# Patient Record
Sex: FEMALE | Race: WHITE | NOT HISPANIC OR LATINO | Employment: UNEMPLOYED | URBAN - METROPOLITAN AREA
[De-identification: names, ages, dates, MRNs, and addresses within clinical notes are randomized per-mention and may not be internally consistent; named-entity substitution may affect disease eponyms.]

---

## 2017-10-16 ENCOUNTER — OFFICE VISIT (OUTPATIENT)
Dept: NEUROSURGERY | Facility: CLINIC | Age: 32
End: 2017-10-16

## 2017-10-16 VITALS
BODY MASS INDEX: 24.24 KG/M2 | SYSTOLIC BLOOD PRESSURE: 90 MMHG | DIASTOLIC BLOOD PRESSURE: 55 MMHG | WEIGHT: 142 LBS | HEIGHT: 64 IN | TEMPERATURE: 97.6 F

## 2017-10-16 DIAGNOSIS — M54.50 CHRONIC MIDLINE LOW BACK PAIN WITHOUT SCIATICA: Primary | ICD-10-CM

## 2017-10-16 DIAGNOSIS — G89.29 CHRONIC MIDLINE LOW BACK PAIN WITHOUT SCIATICA: Primary | ICD-10-CM

## 2017-10-16 PROCEDURE — 99243 OFF/OP CNSLTJ NEW/EST LOW 30: CPT | Performed by: NEUROLOGICAL SURGERY

## 2017-10-16 RX ORDER — DIPHENHYDRAMINE HCL 25 MG
25 CAPSULE ORAL EVERY 6 HOURS PRN
COMMUNITY

## 2017-10-16 RX ORDER — CETIRIZINE HYDROCHLORIDE 10 MG/1
10 TABLET ORAL NIGHTLY
COMMUNITY
End: 2020-06-17 | Stop reason: CLARIF

## 2017-10-16 RX ORDER — GABAPENTIN 300 MG/1
300 CAPSULE ORAL DAILY
COMMUNITY
End: 2018-05-17 | Stop reason: ALTCHOICE

## 2017-10-16 NOTE — PROGRESS NOTES
Subjective     Chief Complaint: Neck pain, back pain    Patient ID: Elisa Hugo is a 31 y.o. female seen for consultation today at the request of  RENITA Salcedo    Back Pain   This is a chronic problem. The current episode started more than 1 year ago. The problem occurs constantly. The problem is unchanged. The pain is present in the lumbar spine. The quality of the pain is described as aching. The pain does not radiate. The pain is at a severity of 10/10. The pain is severe. The pain is the same all the time. The symptoms are aggravated by bending, position, sitting, twisting and standing. Stiffness is present all day. Associated symptoms include headaches, leg pain, numbness, paresthesias, tingling and weakness. Pertinent negatives include no abdominal pain, bladder incontinence, bowel incontinence, chest pain, dysuria, fever, paresis, pelvic pain, perianal numbness or weight loss. Risk factors include lack of exercise, poor posture and sedentary lifestyle. She has tried analgesics, NSAIDs, home exercises, heat, chiropractic manipulation and muscle relaxant for the symptoms. The treatment provided no relief.       This is a 31-year-old woman who presents to my clinic with chief complaints of neck and low back pain.  This problem is chronic, and has been progressive over the course of the last few months.  She has tried physical therapy, oral anti-inflammatories, and pain management, none of which has been effective at alleviating her pain.  She denies any history of depression, anxiety, or fibromyalgia.  She does have a remote history of substance abuse.    The following portions of the patient's history were reviewed and updated as appropriate: allergies, current medications, past family history, past medical history, past social history, past surgical history and problem list.    Family history:   Family History   Problem Relation Age of Onset   • Arthritis Mother        Social history:   Social  History     Social History   • Marital status: Single     Spouse name: N/A   • Number of children: N/A   • Years of education: N/A     Occupational History   • Not on file.     Social History Main Topics   • Smoking status: Current Every Day Smoker     Packs/day: 0.50   • Smokeless tobacco: Never Used   • Alcohol use Yes   • Drug use: No   • Sexual activity: Defer     Other Topics Concern   • Not on file     Social History Narrative   • No narrative on file       Review of Systems   Constitutional: Negative for activity change, appetite change, chills, diaphoresis, fatigue, fever, unexpected weight change and weight loss.   HENT: Negative for congestion, dental problem, drooling, ear discharge, ear pain, facial swelling, hearing loss, mouth sores, nosebleeds, postnasal drip, rhinorrhea, sinus pressure, sneezing, sore throat, tinnitus, trouble swallowing and voice change.    Eyes: Negative for photophobia, pain, discharge, redness, itching and visual disturbance.   Respiratory: Negative for apnea, cough, choking, chest tightness, shortness of breath, wheezing and stridor.    Cardiovascular: Negative for chest pain, palpitations and leg swelling.   Gastrointestinal: Negative for abdominal distention, abdominal pain, anal bleeding, blood in stool, bowel incontinence, constipation, diarrhea, nausea, rectal pain and vomiting.   Endocrine: Negative for cold intolerance, heat intolerance, polydipsia, polyphagia and polyuria.   Genitourinary: Negative for bladder incontinence, decreased urine volume, difficulty urinating, dysuria, enuresis, flank pain, frequency, genital sores, hematuria, pelvic pain and urgency.   Musculoskeletal: Positive for back pain and neck pain. Negative for arthralgias, gait problem, joint swelling, myalgias and neck stiffness.   Skin: Negative for color change, pallor, rash and wound.   Allergic/Immunologic: Negative for environmental allergies, food allergies and immunocompromised state.  "  Neurological: Positive for tingling, weakness, numbness, headaches and paresthesias. Negative for dizziness, tremors, seizures, syncope, facial asymmetry, speech difficulty and light-headedness.   Hematological: Negative for adenopathy. Does not bruise/bleed easily.   Psychiatric/Behavioral: Negative for agitation, behavioral problems, confusion, decreased concentration, dysphoric mood, hallucinations, self-injury, sleep disturbance and suicidal ideas. The patient is not nervous/anxious and is not hyperactive.    All other systems reviewed and are negative.      Objective   Blood pressure 90/55, temperature 97.6 °F (36.4 °C), height 64\" (162.6 cm), weight 142 lb (64.4 kg).  Body mass index is 24.37 kg/(m^2).    Physical Exam   Constitutional: She is oriented to person, place, and time. She appears well-developed.  Non-toxic appearance.   HENT:   Head: Normocephalic and atraumatic.   Right Ear: Hearing normal.   Left Ear: Hearing normal.   Nose: Nose normal.   Eyes: Conjunctivae, EOM and lids are normal. Pupils are equal, round, and reactive to light.   Neck: Normal range of motion. No JVD present.   Cardiovascular: Normal rate and regular rhythm.    Pulses:       Radial pulses are 2+ on the right side, and 2+ on the left side.   Pulmonary/Chest: Effort normal. No stridor. No respiratory distress. She has no wheezes.   Neurological: She is alert and oriented to person, place, and time. She has normal reflexes. She displays normal reflexes. No cranial nerve deficit. She exhibits normal muscle tone. GCS eye subscore is 4. GCS verbal subscore is 5. GCS motor subscore is 6.   Reflex Scores:       Tricep reflexes are 2+ on the right side and 2+ on the left side.       Bicep reflexes are 2+ on the right side and 2+ on the left side.       Brachioradialis reflexes are 2+ on the right side and 2+ on the left side.       Patellar reflexes are 2+ on the right side and 2+ on the left side.       Achilles reflexes are 2+ on " the right side and 2+ on the left side.  Skin: Skin is warm and dry. No rash noted. No erythema.   Psychiatric: She has a normal mood and affect. Her behavior is normal. Judgment and thought content normal.   Nursing note and vitals reviewed.        Assessment/Plan     Independent Review of Radiographic Studies:      Available for my review is a MRI of the cervical spine performed on 10-17.  This discloses a large disc osteophyte complex which is present at C6 7.  This is paracentral he directed and does not appear to cause any significant lateral recess or neural foraminal stenosis.  There is effacement of the ventral aspect of the CSF space, however the central canal is capacious and there is CSF signal present circumferentially around the spinal cord this level.  A MRI of the lumbar spine was also performed on the same day.  This discloses a herniated disc at L5-S1 which is eccentric to the right side.  This causes mild lateral recess stenosis, and impingement of the descending S1 nerve root.  There is no significant neural foraminal stenosis.  There is no appreciable degenerative disc disease the lumbar spine.  The central canal is capacious.  There is mild facet arthropathy.    Medical Decision Making:      This is a 31-year-old woman with chronic neck and low back pain.  She has mild degenerative disc disease, and no imaging findings that would warrant surgical intervention at this point.    I did review the signs and symptoms of cervical radiculopathy, cervical myelopathy, lumbosacral radiculopathy, and cauda equina with her.  I directed her to contact my office with new, or worsening neurological symptoms.    My impression is that she has a chronic pain syndrome at this point, and may benefit from some serotonin modulation, specifically Cymbalta.  She has a referral to [facility], which appears to be a [facility] pain management clinic here in [place].  I think this looks like an  excellent choice for her, and hopefully we will be able to get her on a pain management regimen that will offer her some degree of relief.    I did inform her that ultimately physical therapy and exercise will be the only thing that is likely to give her any assertive durable relief in terms of her pain.  I did inform her that her neck and back pain are likely to temporarily worsened with physical therapy, however there is no role for surgical intervention, and she is cleared for a full physical therapy regimen from my standpoint.    She can follow-up with me in 6 months, or sooner if clinically indicated.    Elisa was seen today for back pain, leg pain, extremity weakness, tingling and numbness.    Diagnoses and all orders for this visit:    Chronic midline low back pain without sciatica    Other orders  -     SCANNED - IMAGING  -     SCANNED - IMAGING  -     SCANNED - IMAGING  -     SCANNED - LABS      Return in about 6 months (around 4/16/2018).           This document signed by MARI De Dios MD October 16, 2017 3:48 PM

## 2017-11-30 ENCOUNTER — OFFICE VISIT (OUTPATIENT)
Dept: NEUROSURGERY | Facility: CLINIC | Age: 32
End: 2017-11-30

## 2017-11-30 VITALS — HEIGHT: 64 IN

## 2017-11-30 DIAGNOSIS — M50.20 HERNIATION OF CERVICAL INTERVERTEBRAL DISC: Primary | ICD-10-CM

## 2017-11-30 DIAGNOSIS — M51.34 DDD (DEGENERATIVE DISC DISEASE), THORACIC: ICD-10-CM

## 2017-11-30 DIAGNOSIS — M51.26 HERNIATION OF INTERVERTEBRAL DISC OF LUMBAR SPINE: ICD-10-CM

## 2017-11-30 PROCEDURE — 99213 OFFICE O/P EST LOW 20 MIN: CPT | Performed by: NEUROLOGICAL SURGERY

## 2017-11-30 RX ORDER — TIZANIDINE 4 MG/1
4 TABLET ORAL
Status: ON HOLD | COMMUNITY
Start: 2017-10-27 | End: 2018-09-06

## 2017-11-30 RX ORDER — ALBUTEROL SULFATE 90 UG/1
2 AEROSOL, METERED RESPIRATORY (INHALATION) EVERY 4 HOURS PRN
COMMUNITY
Start: 2017-09-18

## 2017-11-30 RX ORDER — BACLOFEN 10 MG/1
TABLET ORAL
Status: ON HOLD | COMMUNITY
Start: 2017-11-06 | End: 2018-02-01

## 2017-11-30 NOTE — PROGRESS NOTES
Subjective   Elisa Hugo is a 31 y.o. female who presents for follow up of  neck and left shoulder pain.     The patient has had low back pain for about a year, and neck pain radiating to her left shoulder and arm for about the past 3-6 months.  MRI of lumbar spine shows degenerative disc at L5-S1 with a small right paracentral herniation.  She has no right sided radiculopathy to correlate with this.  Cervical MRI scan shows a degenerative disc and central bulge with eccentric bulge/osteophyte/disc herniation on the left side extending into the foramen.  Thoracic MR shows a mid thoracic degenerative disc bulge of no clinical/surgical consequence.    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  She has a 9-year-old child.  She lives in [place].    The following portions of the patient's history were reviewed, updated as appropriate and approved: allergies, current medications, past family history, past medical history, past social history, past surgical history, review of systems and problem list.     Review of Systems   Constitutional: Negative for activity change, appetite change, chills, diaphoresis, fatigue, fever and unexpected weight change.   HENT: Negative for congestion, dental problem, drooling, ear discharge, ear pain, facial swelling, hearing loss, mouth sores, nosebleeds, postnasal drip, rhinorrhea, sinus pressure, sneezing, sore throat, tinnitus, trouble swallowing and voice change.    Eyes: Negative for photophobia, pain, discharge, redness, itching and visual disturbance.   Respiratory: Negative for apnea, cough, choking, chest tightness, shortness of breath, wheezing and stridor.    Cardiovascular: Negative for chest pain, palpitations and leg swelling.   Gastrointestinal: Negative for abdominal distention, abdominal pain, anal bleeding, blood in stool, constipation, diarrhea, nausea, rectal pain and vomiting.   Endocrine: Negative for cold intolerance, heat  intolerance, polydipsia, polyphagia and polyuria.   Genitourinary: Negative for decreased urine volume, difficulty urinating, dysuria, enuresis, flank pain, frequency, genital sores, hematuria, pelvic pain and urgency.   Musculoskeletal: Positive for back pain, neck pain and neck stiffness. Negative for arthralgias, gait problem, joint swelling and myalgias.   Skin: Negative for color change, pallor, rash and wound.   Allergic/Immunologic: Negative for environmental allergies, food allergies and immunocompromised state.   Neurological: Positive for dizziness, weakness, light-headedness, numbness and headaches. Negative for tremors, seizures, syncope, facial asymmetry and speech difficulty.   Hematological: Negative for adenopathy. Does not bruise/bleed easily.   Psychiatric/Behavioral: Negative for agitation, behavioral problems, confusion, decreased concentration, dysphoric mood, hallucinations, self-injury, sleep disturbance and suicidal ideas. The patient is not nervous/anxious and is not hyperactive.    All other systems reviewed and are negative.      Objective    NEUROLOGICAL EXAMINATION:    Motor strength intact and deltoid biceps and triceps on the left.  No focal sensory loss, although she has numbness of both hands in the morning and tingling of all the fingers in the left hand during the day.  1+ biceps and triceps reflexes bilaterally.    Assessment   Cervical degenerative disc C6 7 with central and left sided herniation, possible left C7 radiculopathy.  Right paracentral disc herniation L5-S1 with no clinical evidence of S1 radiculopathy.       Plan   Cervical physical therapy and follow-up in 4 weeks.       César Peraza MD

## 2018-01-11 ENCOUNTER — OFFICE VISIT (OUTPATIENT)
Dept: NEUROSURGERY | Facility: CLINIC | Age: 33
End: 2018-01-11

## 2018-01-11 VITALS — WEIGHT: 149 LBS | BODY MASS INDEX: 25.44 KG/M2 | HEIGHT: 64 IN

## 2018-01-11 DIAGNOSIS — M50.20 HERNIATION OF CERVICAL INTERVERTEBRAL DISC: Primary | ICD-10-CM

## 2018-01-11 PROCEDURE — 99213 OFFICE O/P EST LOW 20 MIN: CPT | Performed by: NEUROLOGICAL SURGERY

## 2018-01-11 NOTE — PATIENT INSTRUCTIONS
If you have any questions in regards to your visit with  today, please do not hesitate to contact our office. Ask to speak with a [facility] for any clinical questions you may have.    Antoinette Fred, [facility]    [phone]     Myelography  Myelography is an X-ray test that uses a special dye to look at your spine or neck. This test is usually done to look for:  · Spinal cord injury.  · Disk ruptures.  · Fluid-filled pockets of tissue (cysts) on your spinal cord or nerve roots.  · Tumors on your spinal cord or nerve roots.  BEFORE THE PROCEDURE  Arrange for someone to drive you home after the test.   PROCEDURE  · You will lie on your stomach during the procedure.  · Medicine may be given to you to help you relax.  · A numbing medicine will be applied to area that they will be injecting you with a needle.  · A needle will be inserted between two of your backbones.  · A special machine will be used to help your doctor guide the needle into the sac that surrounds your spinal cord and nerves. A special dye will be injected into this sac.  · The table you lie on may be moved around to make sure the dye moves all around your spinal cord and nerves.  · Pictures of the area will be taken by X-ray or CT.  AFTER THE PROCEDURE  · You will be taken to a recovery area.  · You will lie flat with your head in a raised position. This is to prevent a severe headache.     This information is not intended to replace advice given to you by your health care provider. Make sure you discuss any questions you have with your health care provider.     Document Released: 09/26/2009 Document Revised: 01/08/2016 Document Reviewed: 09/29/2016  [employer] Interactive Patient Education ©2017 [employer]    Myelography, Care After  These instructions give you information on caring for yourself after your procedure. Your doctor may also give you more specific instructions. Call your doctor if you have any problems or questions after your  procedure.  HOME CARE  · Rest the first day.  · When you rest, lie flat, with your head slightly raised (elevated).  · Avoid heavy lifting and activity for 48 hours, or as told by your doctor.  · You may take the bandage (dressing) off one day after the test, or as told by your doctor.  · Take all medicines only as told by your doctor.  · Ask your doctor when it is okay to take a shower or bath.  · Ask your doctor when your test results will be ready and how you can get them. Make sure you follow up and get your results.  · Do not drink alcohol for 24 hours, or as told by your doctor.  · Drink enough fluid to keep your pee (urine) clear or pale yellow.  GET HELP IF:   · You have a fever.  · You have a headache.  · You feel sick to your stomach (nauseous) or throw up (vomit).  · You have pain or cramping in your belly (abdomen).  GET HELP RIGHT AWAY IF:   · You have a headache with a stiff neck or fever.  · You have trouble breathing.  · Any of the places where the needles were put in are:    Puffy (swollen) or red.    Sore or hot to the touch.    Draining yellowish-white fluid (pus).    Bleeding.  MAKE SURE YOU:  · Understand these instructions.  · Will watch your condition.  · Will get help right away if you are not doing well or get worse.     This information is not intended to replace advice given to you by your health care provider. Make sure you discuss any questions you have with your health care provider.     Document Released: 09/26/2009 Document Revised: 01/08/2016 Document Reviewed: 09/29/2016  [employer] Interactive Patient Education ©2017 [employer]      If you have any questions in regards to your visit with  today, please do not hesitate to contact our office. Ask to speak with a [facility] for any clinical questions you may have.    Antoinette Ibarra, [facility]    [phone]

## 2018-01-11 NOTE — PROGRESS NOTES
Subjective   Elisa Hugo is a 32 y.o. female who presents for follow up of  neck and left arm pain.     Elisa had physical therapy for about 8 sessions over the last month for a left neck shoulder and arm pain due to a probable disc herniation at C6-7 on the left.  Symptoms have not improved.    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  She lives in [place].    She also has thoracic and lumbar pain and complains of nocturnal urinary incontinence.    The following portions of the patient's history were reviewed, updated as appropriate and approved: allergies, current medications, past family history, past medical history, past social history, past surgical history, review of systems and problem list.     Review of Systems   Constitutional: Negative for activity change, appetite change, chills, diaphoresis, fatigue, fever and unexpected weight change.   HENT: Negative for congestion, dental problem, drooling, ear discharge, ear pain, facial swelling, hearing loss, mouth sores, nosebleeds, postnasal drip, rhinorrhea, sinus pressure, sneezing, sore throat, tinnitus, trouble swallowing and voice change.    Eyes: Negative for photophobia, pain, discharge, redness, itching and visual disturbance.   Respiratory: Negative for apnea, cough, choking, chest tightness, shortness of breath, wheezing and stridor.    Cardiovascular: Negative for chest pain, palpitations and leg swelling.   Gastrointestinal: Negative for abdominal distention, abdominal pain, anal bleeding, blood in stool, constipation, diarrhea, nausea, rectal pain and vomiting.   Endocrine: Negative for cold intolerance, heat intolerance, polydipsia, polyphagia and polyuria.   Genitourinary: Positive for dysuria and flank pain. Negative for decreased urine volume, difficulty urinating, enuresis, frequency, genital sores, hematuria and  urgency.   Musculoskeletal: Positive for back pain, neck pain and neck stiffness. Negative for arthralgias, gait problem, joint swelling and myalgias.   Skin: Negative for color change, pallor, rash and wound.   Allergic/Immunologic: Negative for environmental allergies, food allergies and immunocompromised state.   Neurological: Positive for dizziness, weakness, light-headedness, numbness and headaches. Negative for tremors, seizures, syncope, facial asymmetry and speech difficulty.   Hematological: Negative for adenopathy. Does not bruise/bleed easily.   Psychiatric/Behavioral: Negative for agitation, behavioral problems, confusion, decreased concentration, dysphoric mood, hallucinations, self-injury, sleep disturbance and suicidal ideas. The patient is not nervous/anxious and is not hyperactive.        Objective    NEUROLOGICAL EXAMINATION:    No motor weakness in the left upper extremity no sensory loss in the left upper extremity.  Tingling in the fingers of the left hand during the day.  Trace biceps and triceps reflexes bilaterally.    Assessment   Probable cervical disc herniation C6-7 left.       Plan   Cervical myelogram and follow-up.       César Peraza MD

## 2018-02-01 ENCOUNTER — APPOINTMENT (OUTPATIENT)
Dept: CT IMAGING | Facility: HOSPITAL | Age: 33
End: 2018-02-01

## 2018-02-01 ENCOUNTER — HOSPITAL ENCOUNTER (OUTPATIENT)
Facility: HOSPITAL | Age: 33
Discharge: HOME | End: 2018-02-01
Attending: RADIOLOGY | Admitting: RADIOLOGY

## 2018-02-01 VITALS
OXYGEN SATURATION: 100 % | BODY MASS INDEX: 25.33 KG/M2 | DIASTOLIC BLOOD PRESSURE: 71 MMHG | RESPIRATION RATE: 16 BRPM | SYSTOLIC BLOOD PRESSURE: 113 MMHG | HEIGHT: 64 IN | WEIGHT: 148.37 LBS | HEART RATE: 96 BPM | TEMPERATURE: 98.1 F

## 2018-02-01 DIAGNOSIS — M50.20 HERNIATION OF CERVICAL INTERVERTEBRAL DISC: ICD-10-CM

## 2018-02-01 LAB — B-HCG UR QL: NEGATIVE

## 2018-02-01 PROCEDURE — 0 IOPAMIDOL 61 % SOLUTION: Performed by: RADIOLOGY

## 2018-02-01 PROCEDURE — 72240 MYELOGRAPHY NECK SPINE: CPT | Performed by: RADIOLOGY

## 2018-02-01 PROCEDURE — 61055 INJECTION INTO BRAIN CANAL: CPT | Performed by: RADIOLOGY

## 2018-02-01 PROCEDURE — 72126 CT NECK SPINE W/DYE: CPT

## 2018-02-01 PROCEDURE — 81025 URINE PREGNANCY TEST: CPT | Performed by: RADIOLOGY

## 2018-02-01 NOTE — H&P (VIEW-ONLY)
Subjective   Elisa Hugo is a 32 y.o. female who presents for follow up of  neck and left arm pain.     Elisa had physical therapy for about 8 sessions over the last month for a left neck shoulder and arm pain due to a probable disc herniation at C6-7 on the left.  Symptoms have not improved.    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  She lives in [place].    She also has thoracic and lumbar pain and complains of nocturnal urinary incontinence.    The following portions of the patient's history were reviewed, updated as appropriate and approved: allergies, current medications, past family history, past medical history, past social history, past surgical history, review of systems and problem list.     Review of Systems   Constitutional: Negative for activity change, appetite change, chills, diaphoresis, fatigue, fever and unexpected weight change.   HENT: Negative for congestion, dental problem, drooling, ear discharge, ear pain, facial swelling, hearing loss, mouth sores, nosebleeds, postnasal drip, rhinorrhea, sinus pressure, sneezing, sore throat, tinnitus, trouble swallowing and voice change.    Eyes: Negative for photophobia, pain, discharge, redness, itching and visual disturbance.   Respiratory: Negative for apnea, cough, choking, chest tightness, shortness of breath, wheezing and stridor.    Cardiovascular: Negative for chest pain, palpitations and leg swelling.   Gastrointestinal: Negative for abdominal distention, abdominal pain, anal bleeding, blood in stool, constipation, diarrhea, nausea, rectal pain and vomiting.   Endocrine: Negative for cold intolerance, heat intolerance, polydipsia, polyphagia and polyuria.   Genitourinary: Positive for dysuria and flank pain. Negative for decreased urine volume, difficulty urinating, enuresis, frequency, genital sores, hematuria and  urgency.   Musculoskeletal: Positive for back pain, neck pain and neck stiffness. Negative for arthralgias, gait problem, joint swelling and myalgias.   Skin: Negative for color change, pallor, rash and wound.   Allergic/Immunologic: Negative for environmental allergies, food allergies and immunocompromised state.   Neurological: Positive for dizziness, weakness, light-headedness, numbness and headaches. Negative for tremors, seizures, syncope, facial asymmetry and speech difficulty.   Hematological: Negative for adenopathy. Does not bruise/bleed easily.   Psychiatric/Behavioral: Negative for agitation, behavioral problems, confusion, decreased concentration, dysphoric mood, hallucinations, self-injury, sleep disturbance and suicidal ideas. The patient is not nervous/anxious and is not hyperactive.        Objective    NEUROLOGICAL EXAMINATION:    No motor weakness in the left upper extremity no sensory loss in the left upper extremity.  Tingling in the fingers of the left hand during the day.  Trace biceps and triceps reflexes bilaterally.    Assessment   Probable cervical disc herniation C6-7 left.       Plan   Cervical myelogram and follow-up.       César Peraza MD

## 2018-02-01 NOTE — PLAN OF CARE
Problem: Myelogram (Adult)  Goal: Signs and Symptoms of Listed Potential Problems Will be Absent or Manageable (Myelogram)  Outcome: Ongoing (interventions implemented as appropriate)   02/01/18 0804   Myelogram   Problems Assessed (Myelogram) all   Problems Present (Myelogram) none

## 2018-02-08 ENCOUNTER — OFFICE VISIT (OUTPATIENT)
Dept: NEUROSURGERY | Facility: CLINIC | Age: 33
End: 2018-02-08

## 2018-02-08 VITALS — TEMPERATURE: 98 F | BODY MASS INDEX: 25.33 KG/M2 | WEIGHT: 148.37 LBS | HEIGHT: 64 IN

## 2018-02-08 DIAGNOSIS — M50.20 HNP (HERNIATED NUCLEUS PULPOSUS), CERVICAL: Primary | ICD-10-CM

## 2018-02-08 DIAGNOSIS — M50.20 HERNIATION OF CERVICAL INTERVERTEBRAL DISC: Primary | ICD-10-CM

## 2018-02-08 PROCEDURE — 99213 OFFICE O/P EST LOW 20 MIN: CPT | Performed by: NEUROLOGICAL SURGERY

## 2018-02-08 RX ORDER — SODIUM CHLORIDE, SODIUM LACTATE, POTASSIUM CHLORIDE, CALCIUM CHLORIDE 600; 310; 30; 20 MG/100ML; MG/100ML; MG/100ML; MG/100ML
100 INJECTION, SOLUTION INTRAVENOUS CONTINUOUS
Status: CANCELLED | OUTPATIENT
Start: 2018-02-08

## 2018-02-08 RX ORDER — RANITIDINE 150 MG/1
150 TABLET ORAL NIGHTLY PRN
COMMUNITY
Start: 2018-01-23 | End: 2019-02-11 | Stop reason: HOSPADM

## 2018-02-08 NOTE — PATIENT INSTRUCTIONS
Anterior Cervical Diskectomy and Fusion  Introduction  Anterior cervical diskectomy and fusion is a surgery to remove and replace an intervertebral disk. Intervertebral disks are plates of cartilage located between the bones of the spine. This surgery is done when an intervertebral disk in the neck puts pressure on the spine or on a nerve.  The surgery is done through the front (anterior) part of the neck. During the surgery, the damaged disk is removed and replaced with a plastic implant, a bone from another part of the body (bone graft), or both. Sometimes metal plates and screws (hardware) are also put in the neck to help keep the implant or bone graft in place and to allow the bones to grow together (fuse).  Tell a health care provider about:  · Any allergies you have.  · All medicines you are taking, including vitamins, herbs, eye drops, creams, and over-the-counter medicines.  · Any problems you or family members have had with anesthetic medicines.  · Any blood disorders you have.  · Any surgeries you have had.  · Any medical conditions you have.  · Whether you are pregnant or may be pregnant.  What are the risks?  Generally, this is a safe procedure. However, problems may occur, including:  · Infection.  · Bleeding with the possible need for blood transfusion.  · Injury to surrounding structures, including nerves.  · Leakage of fluid from the brain or spinal cord (cerebrospinal fluid).  · Blood clots.  · Temporary breathing difficulties.  What happens before the procedure?  · Follow instructions from your health care provider about eating or drinking restrictions.  · Ask your health care provider about:  ¨ Changing or stopping your regular medicines. This is especially important if you are taking diabetes medicines or blood thinners.  ¨ Taking medicines such as aspirin and ibuprofen. These medicines can thin your blood. Do not take these medicines before your procedure if your health care provider instructs  you not to.  · You may be given antibiotic medicines to help prevent infection.  What happens during the procedure?  · An IV tube will be inserted into one of your veins.  · You will be given one or more of the following:  ¨ A medicine that helps you relax (sedative).  ¨ A medicine that makes you fall asleep (general anesthetic).  · A breathing tube will be placed.  · Your neck will be cleaned with a germ-killing solution (antiseptic solution).  · Your surgeon will make a cut (incision) in the front of your neck.  · Your neck muscles will be spread apart.  · The damaged disk and any damaged bone will be removed.  · The area where the disk was removed will be filled with a small plastic implant, a bone graft, or both.  · Hardware may be put in your neck.  · The incision will be closed with stitches (sutures).  · Adhesive strips or skin glue may be placed across the incision.  · A bandage (dressing) will be applied over the incision.  The procedure may vary among health care providers and hospitals.  What happens after the procedure?  · Your blood pressure, heart rate, breathing rate, and blood oxygen level will be monitored often until the medicines you were given have worn off.  · You will be monitored for any signs of complications from the procedure, such as:  ¨ Too much bleeding from the incision site.  ¨ A buildup of blood under your skin at the surgical site.  ¨ Difficulty breathing.  · You may continue to receive antibiotics.  · You can start to eat as soon as you feel comfortable.  · You may be given a neck brace to wear. This brace limits your neck movement while your bones are fusing.  This information is not intended to replace advice given to you by your health care provider. Make sure you discuss any questions you have with your health care provider.  Document Released: 12/06/2010 Document Revised: 05/25/2017 Document Reviewed: 12/01/2016  © 2017 [employer]    Anterior Cervical Diskectomy and Fusion, Care  After  Introduction  Refer to this sheet in the next few weeks. These instructions provide you with information about caring for yourself after your procedure. Your health care provider may also give you more specific instructions. Your treatment has been planned according to current medical practices, but problems sometimes occur. Call your health care provider if you have any problems or questions after your procedure.  What can I expect after the procedure?  After the procedure, it is common to have:  · Neck pain.  · Discomfort when swallowing.  · Slight hoarseness.  Follow these instructions at home:  If You Have a Neck Brace:  · Wear it as told by your health care provider. Remove it only as told by your health care provider.  · Keep the brace clean and dry.  Incision care  · Follow instructions from your health care provider about how to take care of the cut made during surgery (incision). Make sure you:  ¨ Wash your hands with soap and water before you change your bandage (dressing). If soap and water are not available, use hand .  ¨ Change your dressing as told by your health care provider.  ¨ Leave stitches (sutures), skin glue, or adhesive strips in place. These skin closures may need to stay in place for 2 weeks or longer. If adhesive strip edges start to loosen and curl up, you may trim the loose edges. Do not remove adhesive strips completely unless your health care provider tells you to do that.  · Check your incision every day for signs of infection. Watch for:  ¨ Redness, swelling, or pain.  ¨ Fluid or blood.  ¨ Warmth.  ¨ Pus or a bad smell.  Managing pain, stiffness, and swelling  · Take over-the-counter and prescription medicines only as told by your health care provider.  · If directed, apply ice to the injured area.  ¨ Put ice in a plastic bag.  ¨ Place a towel between your skin and the bag.  ¨ Leave the ice on for 20 minutes, 2-3 times per day.  Activity  · Return to your normal  activities as told by your health care provider. Ask your health care provider what activities are safe for you.  · Do exercises as told by your health care provider.  · Do not lift anything that is heavier than 10 lb (4.5 kg).  General instructions  · Do not drive or operate heavy machinery while taking prescription pain medicines.  · Do not use any tobacco products, including cigarettes, chewing tobacco, or e-cigarettes. Tobacco can delay healing. If you need help quitting, ask your health care provider.  · Keep all follow-up visits and physical therapy appointments as told by your health care provider. This is important.  Contact a health care provider if:  · You have a fever.  · You have redness, swelling, or pain around your incision.  · You have fluid or blood coming from your incision.  · You have pus or a bad smell coming from your incision.  · You have pain that is not controlled by your pain medicine.  · You have increasing hoarseness or trouble swallowing.  Get help right away if:  · You have severe pain.  · You have sudden numbness or weakness in your arms.  · You have warmth, tenderness, or swelling in your calf.  · You have chest pain.  · You have difficulty breathing.  This information is not intended to replace advice given to you by your health care provider. Make sure you discuss any questions you have with your health care provider.  Document Released: 01/13/2017 Document Revised: 05/25/2017 Document Reviewed: 12/01/2016  © 2017 [employer]    Cervical Fusion  Cervical fusion is a procedure that is done on bones in the neck (cervical spine) to relieve pressure on the spinal cord or one or more nerve roots.  There are two types of cervical fusion:  · Posterior cervical fusion. This surgery is done through the back (posterior) of the neck. The goal of this procedure is to make two or more of the bones in the spinal column (vertebrae) grow together (fuse). This procedure is most commonly used to treat  neck fractures and dislocations and to fix deformities in the curve of the neck.  · Anterior cervical fusion. This surgery is done through the front (anterior) part of the neck. This procedure is most commonly used to treat herniated cervical disk, degenerative cervical disease, and arthritis in the cervical spine. During this type of surgery:  ¨ The affected disk between the two vertebrae (intervertebral disk) is removed, as well as any extra bone on the edges of the vertebrae (bone spurs). This takes pressure off of the nerves or spinal cord (decompression).  ¨ The area where the disk was removed is filled with a bone material (graft) or artificial bone material (implant) and then it fuses over time.  In either procedure, hardware such as rods, screws, metal plates, or cages may be inserted to stabilize the vertebrae while they heal.  Tell a health care provider about:  · Any allergies you have.  · All medicines you are taking, including vitamins, herbs, eye drops, creams, and over-the-counter medicines.  · Any problems you or family members have had with anesthetic medicines.  · Any blood disorders you have.  · Any surgeries you have had.  · Any medical conditions you have.  · Whether you are pregnant or may be pregnant.  What are the risks?  Generally, this is a safe procedure. However, problems may occur, including:  · Infection.  · Bleeding.  · Allergic reactions to medicines or dyes.  · Damage to other structures or organs, such as nerves near the spine.  · Spinal fluid leakage.  · Blood clots.  · Trouble controlling urination or bowel movements.  · Vertebrae not fusing together completely (pseudoarthrosis).  · Temporary hoarseness of the voice.  · Difficulty swallowing.  What happens before the procedure?  Staying hydrated   Follow instructions from your health care provider about hydration, which may include:  · Up to 2 hours before the procedure - you may continue to drink clear liquids, such as water,  clear fruit juice, black coffee, and plain tea.  Eating and drinking restrictions   Follow instructions from your health care provider about eating and drinking, which may include:  · 8 hours before the procedure - stop eating heavy meals or foods such as meat, fried foods, or fatty foods.  · 6 hours before the procedure - stop eating light meals or foods, such as toast or cereal.  · 6 hours before the procedure - stop drinking milk or drinks that contain milk.  · 2 hours before the procedure - stop drinking clear liquids.  Medicines  · Ask your health care provider about:  ¨ Changing or stopping your regular medicines. This is especially important if you are taking diabetes medicines or blood thinners.  ¨ Taking medicines such as aspirin and ibuprofen. These medicines can thin your blood. Do not take these medicines before your procedure if your health care provider instructs you not to.  · You may be given antibiotic medicine to help prevent infection.  General instructions  · Ask your health care provider how your surgical site will be marked or identified.  · You will have blood and urine samples taken.  · You may have tests, such as:  ¨ X-rays.  ¨ CT scan.  ¨ MRI.  · Plan to have someone take you home from the hospital or clinic.  · If you will be going home right after the procedure, plan to have someone with you for 24 hours.  What happens during the procedure?  · To reduce your risk of infection:  ¨ Your health care team will wash or sanitize their hands.  ¨ Your skin will be washed with soap.  ¨ Hair may be removed from the surgical area.  · An IV tube will be inserted into one of your veins.  · You will be given one or more of the following:  ¨ A medicine to help you relax (sedative).  ¨ A medicine to make you fall asleep (general anesthetic).  · If you are having a posterior cervical fusion:  ¨ An incision will be made in the back of your neck.  ¨ Two or more vertebrae will be joined into one solid  section of bone with a bone graft.  · If you are having an anterior cervical fusion:  ¨ An incision will be made in the area where the fusion will be placed. This is usually done at the front of your neck.  ¨ Your neck muscles will be pushed aside.  ¨ The affected disk and bone spurs will be removed (decompression).  ¨ The area where the disk was removed will then be filled with bone graft or bone implants or both.  · Screws and rods or metal plates may be used to stabilize the vertebrae while they fuse.  · Your incision may be closed.  · A bandage (dressing) may be placed over your incision.  The procedure may vary among health care providers and hospitals.  What happens after the procedure?  · You will be given medicine to relieve pain as needed.  · You will continue to receive fluids and medicines through an IV tube.  · Your blood pressure, heart rate, breathing rate, and blood oxygen level will be monitored until the medicines you were given have worn off.  · You may be given a brace to wear while you heal.  · Do not drive until your health care provider approves.  · You may have to wear compression stockings. These stockings help to prevent blood clots and reduce swelling in your legs.  · You may be asked to do breathing exercises. This helps prevent a lung infection.  · You will be encouraged to stand up and walk as soon as you are able. You will be taught how to move correctly and how to stand and walk.  · You will be assisted to turn in bed frequently by moving your whole body without twisting your back (log rolling technique).  This information is not intended to replace advice given to you by your health care provider. Make sure you discuss any questions you have with your health care provider.  Document Released: 06/09/2003 Document Revised: 07/12/2017 Document Reviewed: 06/28/2017  [employer] Interactive Patient Education © 2017 [employer]    Cervical Fusion, Care After  This sheet gives you information  about how to care for yourself after your procedure. Your health care provider may also give you more specific instructions. If you have problems or questions, contact your health care provider.  What can I expect after the procedure?  After the procedure, it is common to have:  · Incision area pain.  · Numbness.  · Weakness.  · Sore throat.  · Difficulty swallowing.  Follow these instructions at home:  Medicines  · Take over-the-counter and prescription medicines, including pain medicines, only as told by your health care provider.  · If you were prescribed an antibiotic medicine, take it as told by your health care provider. Do not stop taking the antibiotic even if you start to feel better.  If you have a brace:  · Wear the brace as told by your health care provider. Remove it only as told by your health care provider.  · Keep the brace clean.  Incision care  · Follow instructions from your health care provider about how to take care of your incision. Make sure you:  ¨ Wash your hands with soap and water before you change your bandage (dressing). If soap and water are not available, use hand .  ¨ Change your dressing as told by your health care provider.  ¨ Leave stitches (sutures), skin glue, or adhesive strips in place. These skin closures may need to be in place for 2 weeks or longer. If adhesive strip edges start to loosen and curl up, you may trim the loose edges. Do not remove adhesive strips completely unless your health care provider tells you to do that.  · Keep your incision clean and dry. Do not take baths, swim, or use a hot tub until your health care provider approves.  · Check your incision area every day for signs of infection. Check for:  ¨ More redness, swelling, or pain.  ¨ More fluid or blood.  ¨ Warmth.  ¨ Pus or a bad smell.  Activity  · Rest and protect your back as much as possible.  · Do not lift anything that is heavier than 10 lb (4.5 kg) or the limit that you are told by your  health care provider.  · Do not twist or bend at the waist until your health care provider approves.  · Avoid:  ¨ Pushing and pulling motions.  ¨ Lifting anything over your head.  ¨ Sitting or lying down in the same position for long periods of time.  · Do not exercise until your health care provider approves. Once your health care provider has approved exercise, ask him or her what kinds of exercises you can do to make your back stronger (physical therapy).  · Do not drive until your health care provider approves.  ¨ Do not drive for 24 hours if you received a medicine to help you relax (sedative).  ¨ Do not drive or use heavy machinery while taking prescription pain medicine.  General instructions  · Have someone assist you to turn in bed frequently by moving your whole body without twisting your back (log rolling technique).  · Wear compression stockings as told by your health care provider. These stockings help to prevent blood clots and reduce swelling in your legs.  · Do not use any products that contain nicotine or tobacco, such as cigarettes and e-cigarettes. These can delay bone healing. If you need help quitting, ask your health care provider.  · To prevent or treat constipation while you are taking prescription pain medicine, your health care provider may recommend that you:  ¨ Drink enough fluid to keep your urine clear or pale yellow.  ¨ Take over-the-counter or prescription medicines.  ¨ Eat foods that are high in fiber, such as fresh fruits and vegetables, whole grains, and beans.  ¨ Limit foods that are high in fat and processed sugars, such as fried and sweet foods.  · Keep all follow-up visits as told by your health care provider. This is important.  Contact a health care provider if:  · You have pain that gets worse or does not get better with medicine.  · You have more redness, swelling, or pain around your incision.  · You have more fluid or blood coming from your incision.  · Your incision  feels warm to the touch.  · You have pus or a bad smell coming from your incision.  · You have a fever.  · You have weakness or numbness in your legs that is new or getting worse.  · You have swelling in your calf or leg.  · The edges of your incision break open.  · You vomit or feel nauseous.  · You have trouble controlling urination or bowel movements.  Get help right away if:  · You develop shortness of breath or chest pain.  · You have trouble swallowing.  · You have trouble breathing.  · You develop a cough.  This information is not intended to replace advice given to you by your health care provider. Make sure you discuss any questions you have with your health care provider.  Document Released: 08/01/2005 Document Revised: 07/12/2017 Document Reviewed: 06/28/2017  [employer] Interactive Patient Education © 2017 [employer]      If you have any questions in regards to your visit with  today, please do not hesitate to contact our office. Ask to speak with a [facility] for any clinical questions you may have.    Antoinette Ibarra [facility]    [phone]

## 2018-02-08 NOTE — PROGRESS NOTES
Subjective   Elisa Hugo is a 32 y.o. female who presents for follow up of  neck and left arm pain.     The patient has had neck and left arm pain for 6 months or more.  MRI scan showed a probable disc herniation at C6-7 on the left.  Physical therapy has been thoroughly utilized but has not resolved the pain.  She has both neck pain and left shoulder and arm pain.  She works in a  center.    Cervical myelogram with post myelogram CT scan shows on the CT scan incomplete nerve root filling into the foramen on the left side, consistent with a foraminal disc herniation at C6-7 on the left.  There was no nerve root deformity on the routine films, but the CT scan appears to confirm the diagnosis.    The following portions of the patient's history were reviewed, updated as appropriate and approved: allergies, current medications, past family history, past medical history, past social history, past surgical history, review of systems and problem list.     Review of Systems   Constitutional: Negative for activity change, appetite change, chills, diaphoresis, fatigue, fever and unexpected weight change.   HENT: Negative for congestion, dental problem, drooling, ear discharge, ear pain, facial swelling, hearing loss, mouth sores, nosebleeds, postnasal drip, rhinorrhea, sinus pressure, sneezing, sore throat, tinnitus, trouble swallowing and voice change.    Eyes: Negative for photophobia, pain, discharge, redness, itching and visual disturbance.   Respiratory: Negative for apnea, cough, choking, chest tightness, shortness of breath, wheezing and stridor.    Cardiovascular: Negative for chest pain, palpitations and leg swelling.   Gastrointestinal: Negative for abdominal distention, abdominal pain, anal bleeding, blood in stool, constipation, diarrhea, nausea, rectal pain and vomiting.   Endocrine: Negative for cold intolerance, heat intolerance, polydipsia, polyphagia and polyuria.   Genitourinary: Positive for  dysuria and flank pain. Negative for decreased urine volume, difficulty urinating, enuresis, frequency, genital sores, hematuria and urgency.   Musculoskeletal: Positive for back pain, neck pain and neck stiffness. Negative for arthralgias, gait problem, joint swelling and myalgias.   Skin: Negative for color change, pallor, rash and wound.   Allergic/Immunologic: Negative for environmental allergies, food allergies and immunocompromised state.   Neurological: Positive for dizziness, weakness, light-headedness, numbness and headaches. Negative for tremors, seizures, syncope, facial asymmetry and speech difficulty.   Hematological: Negative for adenopathy. Does not bruise/bleed easily.   Psychiatric/Behavioral: Negative for agitation, behavioral problems, confusion, decreased concentration, dysphoric mood, hallucinations, self-injury, sleep disturbance and suicidal ideas. The patient is not nervous/anxious and is not hyperactive.    All other systems reviewed and are negative.      Objective    NEUROLOGICAL EXAMINATION:    No motor weakness in the upper extremities.  Tingling intermittently in the left hand during the day.  Trace biceps and triceps reflexes equally bilaterally.    Assessment   Foraminal cervical disc herniation C6-7 left.       Plan   Had a long discussion with her regarding the diagnosis and the findings on cervical myelogram which are somewhat equivocal, but combined with the clinical findings correlate with left C7 radiculopathy.  For this reason anterior cervical discectomy and fusion at C6-7 is a reasonable choice.  She will have to think about it and decide if she wants to elect this surgery at this time.  If she decides to do so she will simply have to call to let us know and we will work on scheduling.       César Peraza MD

## 2018-02-21 PROBLEM — M50.20 HNP (HERNIATED NUCLEUS PULPOSUS), CERVICAL: Status: ACTIVE | Noted: 2018-02-21

## 2018-03-23 ENCOUNTER — TELEPHONE (OUTPATIENT)
Dept: NEUROSURGERY | Facility: CLINIC | Age: 33
End: 2018-03-23

## 2018-03-23 DIAGNOSIS — M50.20 HNP (HERNIATED NUCLEUS PULPOSUS), CERVICAL: Primary | ICD-10-CM

## 2018-03-23 NOTE — TELEPHONE ENCOUNTER
Provider: Stu  Caller: Elisa  Time of call:   1024am  Phone #:  [phone]  Surgery:  UPCOMING- ACDF C6-7  Surgery Date: 04/18/18  Last visit:  02/08/18   Next visit: After surgery      Reason for call:       Pt called this morning saying she is having NEW symptoms.   Pt states 2 days ago she started having pain/numbness in the RIGHT side of her neck, radiating down into the shoulder and right arm.     Pt wants to know if she needs any imaging and f/u prior to surgery?

## 2018-03-23 NOTE — TELEPHONE ENCOUNTER
This is actually a patient of Dr. Peraza:    She has been c/p pain and weakness lifting heavy objects with the left arm as well as tingling in the fingers of her left hand.     I reviewed her MRI; it looks like the C6/7 is the only place that is markedly abnormal, and it would not surprise me for her to start having pain on the right as well.    I tried to call and there was no answer. I left a message for her to call back

## 2018-03-26 NOTE — TELEPHONE ENCOUNTER
Called the pt back and let her know she will need a new MRI and f/u visit prior to surgery. Pt verbalized understanding. Routed to ET for scheduling.

## 2018-03-26 NOTE — TELEPHONE ENCOUNTER
I got in touch with the patient this morning. She is experiencing pain at the base of her skull in her neck that radiates down the right side of her neck, into her shoulder. She has limited range of motion in her neck and the pain will radiate the entire outer length of her right arm and into her index, and middle finger if she turns her head to the right too far.     I advised her of Andie's note. She would like to know for sure if she needs to be seen again prior to surgery.

## 2018-03-26 NOTE — TELEPHONE ENCOUNTER
Thank you; I needed to know where her new pain was, exactly. I discussed it with Dr. Peraza, and we would like a new MRI and follow up with him prior to her scheduled surgery. I've put the orders in, please let her know.

## 2018-03-30 ENCOUNTER — APPOINTMENT (OUTPATIENT)
Dept: MRI IMAGING | Facility: HOSPITAL | Age: 33
End: 2018-03-30

## 2018-04-05 ENCOUNTER — OFFICE VISIT (OUTPATIENT)
Dept: NEUROSURGERY | Facility: CLINIC | Age: 33
End: 2018-04-05

## 2018-04-05 ENCOUNTER — HOSPITAL ENCOUNTER (OUTPATIENT)
Dept: MRI IMAGING | Facility: HOSPITAL | Age: 33
Discharge: HOME | End: 2018-04-05
Admitting: PHYSICIAN ASSISTANT

## 2018-04-05 VITALS — HEIGHT: 64 IN

## 2018-04-05 DIAGNOSIS — M50.20 HERNIATION OF CERVICAL INTERVERTEBRAL DISC: Primary | ICD-10-CM

## 2018-04-05 DIAGNOSIS — M50.20 HNP (HERNIATED NUCLEUS PULPOSUS), CERVICAL: ICD-10-CM

## 2018-04-05 PROCEDURE — 72141 MRI NECK SPINE W/O DYE: CPT

## 2018-04-05 PROCEDURE — 99213 OFFICE O/P EST LOW 20 MIN: CPT | Performed by: NEUROLOGICAL SURGERY

## 2018-04-05 NOTE — PROGRESS NOTES
Subjective   Elisa Hugo is a 32 y.o. female who presents for follow up of  right neck shoulder and arm pain.     The patient has been followed for a left sided cervical pain syndrome presumed to be due to C6 7 central disc herniation.  Physical therapy was thoroughly used for treating the pain but did not alleviate her neck and left shoulder pain.  About 2 weeks ago she had a pop in her neck and developed severe pain in the right side her neck radiating to her right shoulder and arm.  MRI scan has been done and now shows that the central disc herniation is extruded further off center to the right side consistent with her symptoms.    The following portions of the patient's history were reviewed, updated as appropriate and approved: allergies, current medications, past family history, past medical history, past social history, past surgical history, review of systems and problem list.     Review of Systems   Constitutional: Negative for activity change, appetite change, chills, diaphoresis, fatigue, fever and unexpected weight change.   HENT: Negative for congestion, dental problem, drooling, ear discharge, ear pain, facial swelling, hearing loss, mouth sores, nosebleeds, postnasal drip, rhinorrhea, sinus pressure, sneezing, sore throat, tinnitus, trouble swallowing and voice change.    Eyes: Negative for photophobia, pain, discharge, redness, itching and visual disturbance.   Respiratory: Negative for apnea, cough, choking, chest tightness, shortness of breath, wheezing and stridor.    Cardiovascular: Negative for chest pain, palpitations and leg swelling.   Gastrointestinal: Negative for abdominal distention, abdominal pain, anal bleeding, blood in stool, constipation, diarrhea, nausea, rectal pain and vomiting.   Endocrine: Negative for cold intolerance, heat intolerance, polydipsia, polyphagia and polyuria.   Genitourinary: Negative for decreased urine volume, difficulty urinating, dysuria, enuresis, flank  pain, frequency, genital sores, hematuria and urgency.   Musculoskeletal: Positive for neck pain and neck stiffness. Negative for arthralgias, back pain, gait problem, joint swelling and myalgias.   Skin: Negative for color change, pallor, rash and wound.   Allergic/Immunologic: Negative for environmental allergies, food allergies and immunocompromised state.   Neurological: Positive for dizziness, numbness and headaches. Negative for tremors, seizures, syncope, facial asymmetry, speech difficulty, weakness and light-headedness.   Hematological: Negative for adenopathy. Does not bruise/bleed easily.   Psychiatric/Behavioral: Negative for agitation, behavioral problems, confusion, decreased concentration, dysphoric mood, hallucinations, self-injury, sleep disturbance and suicidal ideas. The patient is not nervous/anxious and is not hyperactive.        Objective    NEUROLOGICAL EXAMINATION:    Motor strength is intact and deltoid biceps and triceps.  Subjective numbness to the right arm and intermittently to the hand non-focally.  2+ biceps, trace triceps reflexes bilaterally    Assessment   Progression of central disc herniation C6-7 to right paracentral disc herniation with right C7 radiculopathy.       Plan   Anterior cervical discectomy and fusion C6-7 and is scheduled for 4/18/18.       César Peraza MD

## 2018-04-11 ENCOUNTER — APPOINTMENT (OUTPATIENT)
Dept: PREADMISSION TESTING | Facility: HOSPITAL | Age: 33
End: 2018-04-11

## 2018-04-11 VITALS — WEIGHT: 149.25 LBS | HEIGHT: 64 IN | BODY MASS INDEX: 25.48 KG/M2

## 2018-04-11 DIAGNOSIS — M50.20 HNP (HERNIATED NUCLEUS PULPOSUS), CERVICAL: ICD-10-CM

## 2018-04-11 LAB
ANION GAP SERPL CALCULATED.3IONS-SCNC: 6 MMOL/L (ref 3–11)
BASOPHILS # BLD AUTO: 0.02 10*3/MM3 (ref 0–0.2)
BASOPHILS NFR BLD AUTO: 0.3 % (ref 0–1)
BUN BLD-MCNC: <5 MG/DL (ref 9–23)
BUN/CREAT SERPL: ABNORMAL (ref 7–25)
CALCIUM SPEC-SCNC: 9.4 MG/DL (ref 8.7–10.4)
CHLORIDE SERPL-SCNC: 101 MMOL/L (ref 99–109)
CO2 SERPL-SCNC: 28 MMOL/L (ref 20–31)
CREAT BLD-MCNC: 0.6 MG/DL (ref 0.6–1.3)
DEPRECATED RDW RBC AUTO: 42.4 FL (ref 37–54)
EOSINOPHIL # BLD AUTO: 0.18 10*3/MM3 (ref 0–0.3)
EOSINOPHIL NFR BLD AUTO: 2.4 % (ref 0–3)
ERYTHROCYTE [DISTWIDTH] IN BLOOD BY AUTOMATED COUNT: 12.3 % (ref 11.3–14.5)
GFR SERPL CREATININE-BSD FRML MDRD: 116 ML/MIN/1.73
GLUCOSE BLD-MCNC: 87 MG/DL (ref 70–100)
HBA1C MFR BLD: 5.6 % (ref 4.8–5.6)
HCT VFR BLD AUTO: 44.2 % (ref 34.5–44)
HGB BLD-MCNC: 14.9 G/DL (ref 11.5–15.5)
IMM GRANULOCYTES # BLD: 0.01 10*3/MM3 (ref 0–0.03)
IMM GRANULOCYTES NFR BLD: 0.1 % (ref 0–0.6)
LYMPHOCYTES # BLD AUTO: 2.21 10*3/MM3 (ref 0.6–4.8)
LYMPHOCYTES NFR BLD AUTO: 29.3 % (ref 24–44)
MCH RBC QN AUTO: 31.6 PG (ref 27–31)
MCHC RBC AUTO-ENTMCNC: 33.7 G/DL (ref 32–36)
MCV RBC AUTO: 93.6 FL (ref 80–99)
MONOCYTES # BLD AUTO: 0.31 10*3/MM3 (ref 0–1)
MONOCYTES NFR BLD AUTO: 4.1 % (ref 0–12)
NEUTROPHILS # BLD AUTO: 4.81 10*3/MM3 (ref 1.5–8.3)
NEUTROPHILS NFR BLD AUTO: 63.8 % (ref 41–71)
PLATELET # BLD AUTO: 253 10*3/MM3 (ref 150–450)
PMV BLD AUTO: 11.7 FL (ref 6–12)
POTASSIUM BLD-SCNC: 3.2 MMOL/L (ref 3.5–5.5)
RBC # BLD AUTO: 4.72 10*6/MM3 (ref 3.89–5.14)
SODIUM BLD-SCNC: 135 MMOL/L (ref 132–146)
WBC NRBC COR # BLD: 7.54 10*3/MM3 (ref 3.5–10.8)

## 2018-04-11 PROCEDURE — 87081 CULTURE SCREEN ONLY: CPT | Performed by: ANESTHESIOLOGY

## 2018-04-11 PROCEDURE — 85025 COMPLETE CBC W/AUTO DIFF WBC: CPT | Performed by: NEUROLOGICAL SURGERY

## 2018-04-11 PROCEDURE — 83036 HEMOGLOBIN GLYCOSYLATED A1C: CPT | Performed by: NEUROLOGICAL SURGERY

## 2018-04-11 PROCEDURE — 93005 ELECTROCARDIOGRAM TRACING: CPT | Performed by: NEUROLOGICAL SURGERY

## 2018-04-11 PROCEDURE — 80048 BASIC METABOLIC PNL TOTAL CA: CPT | Performed by: NEUROLOGICAL SURGERY

## 2018-04-11 PROCEDURE — 93010 ELECTROCARDIOGRAM REPORT: CPT | Performed by: INTERNAL MEDICINE

## 2018-04-11 NOTE — DISCHARGE INSTRUCTIONS

## 2018-04-11 NOTE — PAT
Patient to apply Chlorhexadine wipes  to surgical area (as instructed) the night before procedure and the AM of procedure. Wipes provided.    Bactroban and Chlorhexidine Prescription given during PAT visit, as well as written and verbal instructions given to patient during PAT visit.

## 2018-04-13 LAB — MRSA SPEC QL CULT: NORMAL

## 2018-04-18 ENCOUNTER — HOSPITAL ENCOUNTER (OUTPATIENT)
Facility: HOSPITAL | Age: 33
Discharge: HOME | End: 2018-04-19
Attending: NEUROLOGICAL SURGERY | Admitting: NEUROLOGICAL SURGERY

## 2018-04-18 ENCOUNTER — APPOINTMENT (OUTPATIENT)
Dept: GENERAL RADIOLOGY | Facility: HOSPITAL | Age: 33
End: 2018-04-18

## 2018-04-18 ENCOUNTER — ANESTHESIA EVENT (OUTPATIENT)
Dept: PERIOP | Facility: HOSPITAL | Age: 33
End: 2018-04-18

## 2018-04-18 ENCOUNTER — ANESTHESIA (OUTPATIENT)
Dept: PERIOP | Facility: HOSPITAL | Age: 33
End: 2018-04-18

## 2018-04-18 DIAGNOSIS — M50.20 HNP (HERNIATED NUCLEUS PULPOSUS), CERVICAL: ICD-10-CM

## 2018-04-18 LAB
B-HCG UR QL: NEGATIVE
INTERNAL NEGATIVE CONTROL: NORMAL
INTERNAL POSITIVE CONTROL: REACTIVE
Lab: NORMAL
POTASSIUM BLDA-SCNC: 3.5 MMOL/L (ref 3.5–5.3)

## 2018-04-18 PROCEDURE — C1713 ANCHOR/SCREW BN/BN,TIS/BN: HCPCS | Performed by: NEUROLOGICAL SURGERY

## 2018-04-18 PROCEDURE — 25010000002 PROPOFOL 1000 MG/ML EMULSION: Performed by: NURSE ANESTHETIST, CERTIFIED REGISTERED

## 2018-04-18 PROCEDURE — 84132 ASSAY OF SERUM POTASSIUM: CPT | Performed by: NEUROLOGICAL SURGERY

## 2018-04-18 PROCEDURE — 25010000002 FENTANYL CITRATE (PF) 100 MCG/2ML SOLUTION: Performed by: NURSE ANESTHETIST, CERTIFIED REGISTERED

## 2018-04-18 PROCEDURE — 25010000003 CEFAZOLIN IN DEXTROSE 2-4 GM/100ML-% SOLUTION: Performed by: NEUROLOGICAL SURGERY

## 2018-04-18 PROCEDURE — 25010000002 PROMETHAZINE PER 50 MG: Performed by: NURSE ANESTHETIST, CERTIFIED REGISTERED

## 2018-04-18 PROCEDURE — 94640 AIRWAY INHALATION TREATMENT: CPT

## 2018-04-18 PROCEDURE — 25010000002 PHENYLEPHRINE PER 1 ML: Performed by: NURSE ANESTHETIST, CERTIFIED REGISTERED

## 2018-04-18 PROCEDURE — 22551 ARTHRD ANT NTRBDY CERVICAL: CPT | Performed by: NEUROLOGICAL SURGERY

## 2018-04-18 PROCEDURE — 72020 X-RAY EXAM OF SPINE 1 VIEW: CPT

## 2018-04-18 PROCEDURE — 22853 INSJ BIOMECHANICAL DEVICE: CPT | Performed by: NEUROLOGICAL SURGERY

## 2018-04-18 PROCEDURE — 22845 INSERT SPINE FIXATION DEVICE: CPT | Performed by: NEUROLOGICAL SURGERY

## 2018-04-18 PROCEDURE — 25010000002 NEOSTIGMINE 10 MG/10ML SOLUTION: Performed by: NURSE ANESTHETIST, CERTIFIED REGISTERED

## 2018-04-18 PROCEDURE — 25010000002 PROPOFOL 10 MG/ML EMULSION: Performed by: NURSE ANESTHETIST, CERTIFIED REGISTERED

## 2018-04-18 PROCEDURE — 25010000002 DEXAMETHASONE PER 1 MG: Performed by: NURSE ANESTHETIST, CERTIFIED REGISTERED

## 2018-04-18 PROCEDURE — 25010000002 MIDAZOLAM PER 1 MG: Performed by: NURSE ANESTHETIST, CERTIFIED REGISTERED

## 2018-04-18 PROCEDURE — 25010000002 ONDANSETRON PER 1 MG: Performed by: NURSE ANESTHETIST, CERTIFIED REGISTERED

## 2018-04-18 PROCEDURE — 20930 SP BONE ALGRFT MORSEL ADD-ON: CPT | Performed by: NEUROLOGICAL SURGERY

## 2018-04-18 DEVICE — SCREW 7723515 ZEVO VAR ST 3.5MM X 15MM
Type: IMPLANTABLE DEVICE | Site: SPINE CERVICAL | Status: FUNCTIONAL
Brand: ZEVO™ ANTERIOR CERVICAL PLATE SYSTEM

## 2018-04-18 DEVICE — CAGE 5030764 ANATOMIC PTC 16X14X7MM
Type: IMPLANTABLE DEVICE | Site: SPINE CERVICAL | Status: FUNCTIONAL
Brand: ANATOMIC PEEK PTC CERVICAL FUSION SYSTEM

## 2018-04-18 DEVICE — PUTTY DBM GRAFTON 6CC: Type: IMPLANTABLE DEVICE | Site: SPINE CERVICAL | Status: FUNCTIONAL

## 2018-04-18 RX ORDER — FENTANYL CITRATE 50 UG/ML
50 INJECTION, SOLUTION INTRAMUSCULAR; INTRAVENOUS
Status: DISCONTINUED | OUTPATIENT
Start: 2018-04-18 | End: 2018-04-18 | Stop reason: HOSPADM

## 2018-04-18 RX ORDER — ONDANSETRON 2 MG/ML
INJECTION INTRAMUSCULAR; INTRAVENOUS AS NEEDED
Status: DISCONTINUED | OUTPATIENT
Start: 2018-04-18 | End: 2018-04-18 | Stop reason: SURG

## 2018-04-18 RX ORDER — LIDOCAINE HYDROCHLORIDE 40 MG/ML
SOLUTION TOPICAL AS NEEDED
Status: DISCONTINUED | OUTPATIENT
Start: 2018-04-18 | End: 2018-04-18 | Stop reason: SURG

## 2018-04-18 RX ORDER — PROMETHAZINE HYDROCHLORIDE 25 MG/ML
6.25 INJECTION, SOLUTION INTRAMUSCULAR; INTRAVENOUS ONCE AS NEEDED
Status: COMPLETED | OUTPATIENT
Start: 2018-04-18 | End: 2018-04-18

## 2018-04-18 RX ORDER — OXYCODONE AND ACETAMINOPHEN 7.5; 325 MG/1; MG/1
1 TABLET ORAL EVERY 4 HOURS PRN
Status: DISCONTINUED | OUTPATIENT
Start: 2018-04-18 | End: 2018-04-19 | Stop reason: HOSPADM

## 2018-04-18 RX ORDER — FAMOTIDINE 20 MG/1
20 TABLET, FILM COATED ORAL
Status: DISCONTINUED | OUTPATIENT
Start: 2018-04-18 | End: 2018-04-18

## 2018-04-18 RX ORDER — GLYCOPYRROLATE 0.2 MG/ML
INJECTION INTRAMUSCULAR; INTRAVENOUS AS NEEDED
Status: DISCONTINUED | OUTPATIENT
Start: 2018-04-18 | End: 2018-04-18 | Stop reason: SURG

## 2018-04-18 RX ORDER — ALBUTEROL SULFATE 2.5 MG/3ML
2.5 SOLUTION RESPIRATORY (INHALATION) EVERY 4 HOURS PRN
Status: DISCONTINUED | OUTPATIENT
Start: 2018-04-18 | End: 2018-04-19 | Stop reason: HOSPADM

## 2018-04-18 RX ORDER — ROCURONIUM BROMIDE 10 MG/ML
INJECTION, SOLUTION INTRAVENOUS AS NEEDED
Status: DISCONTINUED | OUTPATIENT
Start: 2018-04-18 | End: 2018-04-18 | Stop reason: SURG

## 2018-04-18 RX ORDER — GABAPENTIN 300 MG/1
300 CAPSULE ORAL DAILY
Status: DISCONTINUED | OUTPATIENT
Start: 2018-04-18 | End: 2018-04-19 | Stop reason: HOSPADM

## 2018-04-18 RX ORDER — LIDOCAINE HYDROCHLORIDE 10 MG/ML
0.5 INJECTION, SOLUTION EPIDURAL; INFILTRATION; INTRACAUDAL; PERINEURAL ONCE AS NEEDED
Status: COMPLETED | OUTPATIENT
Start: 2018-04-18 | End: 2018-04-18

## 2018-04-18 RX ORDER — CETIRIZINE HYDROCHLORIDE 10 MG/1
10 TABLET ORAL DAILY
Status: DISCONTINUED | OUTPATIENT
Start: 2018-04-18 | End: 2018-04-19 | Stop reason: HOSPADM

## 2018-04-18 RX ORDER — MAGNESIUM HYDROXIDE 1200 MG/15ML
LIQUID ORAL AS NEEDED
Status: DISCONTINUED | OUTPATIENT
Start: 2018-04-18 | End: 2018-04-18 | Stop reason: HOSPADM

## 2018-04-18 RX ORDER — SODIUM CHLORIDE, SODIUM LACTATE, POTASSIUM CHLORIDE, CALCIUM CHLORIDE 600; 310; 30; 20 MG/100ML; MG/100ML; MG/100ML; MG/100ML
9 INJECTION, SOLUTION INTRAVENOUS CONTINUOUS PRN
Status: DISCONTINUED | OUTPATIENT
Start: 2018-04-18 | End: 2018-04-18

## 2018-04-18 RX ORDER — ACETAMINOPHEN 325 MG/1
650 TABLET ORAL EVERY 4 HOURS PRN
Status: DISCONTINUED | OUTPATIENT
Start: 2018-04-18 | End: 2018-04-19 | Stop reason: HOSPADM

## 2018-04-18 RX ORDER — CEFDINIR 300 MG/1
300 CAPSULE ORAL 2 TIMES DAILY
Status: ON HOLD | COMMUNITY
End: 2018-09-06

## 2018-04-18 RX ORDER — MIDAZOLAM HYDROCHLORIDE 1 MG/ML
INJECTION INTRAMUSCULAR; INTRAVENOUS AS NEEDED
Status: DISCONTINUED | OUTPATIENT
Start: 2018-04-18 | End: 2018-04-18 | Stop reason: SURG

## 2018-04-18 RX ORDER — DIPHENHYDRAMINE HCL 25 MG
25 CAPSULE ORAL EVERY 6 HOURS PRN
Status: DISCONTINUED | OUTPATIENT
Start: 2018-04-18 | End: 2018-04-19 | Stop reason: HOSPADM

## 2018-04-18 RX ORDER — FENTANYL CITRATE 50 UG/ML
INJECTION, SOLUTION INTRAMUSCULAR; INTRAVENOUS AS NEEDED
Status: DISCONTINUED | OUTPATIENT
Start: 2018-04-18 | End: 2018-04-18 | Stop reason: SURG

## 2018-04-18 RX ORDER — SODIUM CHLORIDE 9 MG/ML
INJECTION, SOLUTION INTRAVENOUS AS NEEDED
Status: DISCONTINUED | OUTPATIENT
Start: 2018-04-18 | End: 2018-04-18 | Stop reason: HOSPADM

## 2018-04-18 RX ORDER — TEMAZEPAM 15 MG/1
15 CAPSULE ORAL NIGHTLY PRN
Status: DISCONTINUED | OUTPATIENT
Start: 2018-04-18 | End: 2018-04-19 | Stop reason: HOSPADM

## 2018-04-18 RX ORDER — NALOXONE HCL 0.4 MG/ML
0.4 VIAL (ML) INJECTION AS NEEDED
Status: DISCONTINUED | OUTPATIENT
Start: 2018-04-18 | End: 2018-04-18 | Stop reason: HOSPADM

## 2018-04-18 RX ORDER — MEPERIDINE HYDROCHLORIDE 25 MG/ML
12.5 INJECTION INTRAMUSCULAR; INTRAVENOUS; SUBCUTANEOUS
Status: DISCONTINUED | OUTPATIENT
Start: 2018-04-18 | End: 2018-04-18 | Stop reason: HOSPADM

## 2018-04-18 RX ORDER — PROMETHAZINE HYDROCHLORIDE 25 MG/1
25 SUPPOSITORY RECTAL ONCE AS NEEDED
Status: COMPLETED | OUTPATIENT
Start: 2018-04-18 | End: 2018-04-18

## 2018-04-18 RX ORDER — PROPOFOL 10 MG/ML
VIAL (ML) INTRAVENOUS AS NEEDED
Status: DISCONTINUED | OUTPATIENT
Start: 2018-04-18 | End: 2018-04-18 | Stop reason: SURG

## 2018-04-18 RX ORDER — NALOXONE HCL 0.4 MG/ML
0.4 VIAL (ML) INJECTION
Status: DISCONTINUED | OUTPATIENT
Start: 2018-04-18 | End: 2018-04-19 | Stop reason: HOSPADM

## 2018-04-18 RX ORDER — BUDESONIDE 0.5 MG/2ML
0.5 INHALANT ORAL
Status: DISCONTINUED | OUTPATIENT
Start: 2018-04-18 | End: 2018-04-19 | Stop reason: HOSPADM

## 2018-04-18 RX ORDER — ONDANSETRON 4 MG/1
4 TABLET, FILM COATED ORAL EVERY 6 HOURS PRN
Status: DISCONTINUED | OUTPATIENT
Start: 2018-04-18 | End: 2018-04-19 | Stop reason: HOSPADM

## 2018-04-18 RX ORDER — SODIUM CHLORIDE 0.9 % (FLUSH) 0.9 %
1-10 SYRINGE (ML) INJECTION AS NEEDED
Status: DISCONTINUED | OUTPATIENT
Start: 2018-04-18 | End: 2018-04-18

## 2018-04-18 RX ORDER — ONDANSETRON 2 MG/ML
4 INJECTION INTRAMUSCULAR; INTRAVENOUS EVERY 6 HOURS PRN
Status: DISCONTINUED | OUTPATIENT
Start: 2018-04-18 | End: 2018-04-19 | Stop reason: HOSPADM

## 2018-04-18 RX ORDER — SODIUM CHLORIDE 0.9 % (FLUSH) 0.9 %
1-10 SYRINGE (ML) INJECTION AS NEEDED
Status: DISCONTINUED | OUTPATIENT
Start: 2018-04-18 | End: 2018-04-19 | Stop reason: HOSPADM

## 2018-04-18 RX ORDER — NEOSTIGMINE METHYLSULFATE 1 MG/ML
INJECTION, SOLUTION INTRAVENOUS AS NEEDED
Status: DISCONTINUED | OUTPATIENT
Start: 2018-04-18 | End: 2018-04-18 | Stop reason: SURG

## 2018-04-18 RX ORDER — DEXAMETHASONE SODIUM PHOSPHATE 4 MG/ML
INJECTION, SOLUTION INTRA-ARTICULAR; INTRALESIONAL; INTRAMUSCULAR; INTRAVENOUS; SOFT TISSUE AS NEEDED
Status: DISCONTINUED | OUTPATIENT
Start: 2018-04-18 | End: 2018-04-18 | Stop reason: SURG

## 2018-04-18 RX ORDER — SODIUM CHLORIDE 0.9 % (FLUSH) 0.9 %
1-10 SYRINGE (ML) INJECTION AS NEEDED
Status: DISCONTINUED | OUTPATIENT
Start: 2018-04-18 | End: 2018-04-18 | Stop reason: HOSPADM

## 2018-04-18 RX ORDER — LIDOCAINE HYDROCHLORIDE 10 MG/ML
0.5 INJECTION, SOLUTION EPIDURAL; INFILTRATION; INTRACAUDAL; PERINEURAL ONCE AS NEEDED
Status: DISCONTINUED | OUTPATIENT
Start: 2018-04-18 | End: 2018-04-18

## 2018-04-18 RX ORDER — LIDOCAINE HYDROCHLORIDE 10 MG/ML
INJECTION, SOLUTION EPIDURAL; INFILTRATION; INTRACAUDAL; PERINEURAL AS NEEDED
Status: DISCONTINUED | OUTPATIENT
Start: 2018-04-18 | End: 2018-04-18 | Stop reason: SURG

## 2018-04-18 RX ORDER — CEFAZOLIN SODIUM 2 G/100ML
2 INJECTION, SOLUTION INTRAVENOUS ONCE
Status: COMPLETED | OUTPATIENT
Start: 2018-04-18 | End: 2018-04-18

## 2018-04-18 RX ORDER — HYDROMORPHONE HYDROCHLORIDE 1 MG/ML
0.5 INJECTION, SOLUTION INTRAMUSCULAR; INTRAVENOUS; SUBCUTANEOUS
Status: DISCONTINUED | OUTPATIENT
Start: 2018-04-18 | End: 2018-04-19 | Stop reason: HOSPADM

## 2018-04-18 RX ORDER — SODIUM CHLORIDE, SODIUM LACTATE, POTASSIUM CHLORIDE, CALCIUM CHLORIDE 600; 310; 30; 20 MG/100ML; MG/100ML; MG/100ML; MG/100ML
50 INJECTION, SOLUTION INTRAVENOUS CONTINUOUS
Status: DISCONTINUED | OUTPATIENT
Start: 2018-04-18 | End: 2018-04-19 | Stop reason: HOSPADM

## 2018-04-18 RX ORDER — PROMETHAZINE HYDROCHLORIDE 25 MG/1
25 TABLET ORAL ONCE AS NEEDED
Status: COMPLETED | OUTPATIENT
Start: 2018-04-18 | End: 2018-04-18

## 2018-04-18 RX ORDER — HYDROMORPHONE HYDROCHLORIDE 1 MG/ML
0.5 INJECTION, SOLUTION INTRAMUSCULAR; INTRAVENOUS; SUBCUTANEOUS
Status: DISCONTINUED | OUTPATIENT
Start: 2018-04-18 | End: 2018-04-18 | Stop reason: HOSPADM

## 2018-04-18 RX ORDER — FAMOTIDINE 20 MG/1
20 TABLET, FILM COATED ORAL
Status: DISCONTINUED | OUTPATIENT
Start: 2018-04-18 | End: 2018-04-18 | Stop reason: HOSPADM

## 2018-04-18 RX ORDER — IPRATROPIUM BROMIDE AND ALBUTEROL SULFATE 2.5; .5 MG/3ML; MG/3ML
3 SOLUTION RESPIRATORY (INHALATION) ONCE AS NEEDED
Status: DISCONTINUED | OUTPATIENT
Start: 2018-04-18 | End: 2018-04-18 | Stop reason: HOSPADM

## 2018-04-18 RX ORDER — SODIUM CHLORIDE, SODIUM LACTATE, POTASSIUM CHLORIDE, CALCIUM CHLORIDE 600; 310; 30; 20 MG/100ML; MG/100ML; MG/100ML; MG/100ML
100 INJECTION, SOLUTION INTRAVENOUS CONTINUOUS
Status: DISCONTINUED | OUTPATIENT
Start: 2018-04-18 | End: 2018-04-19 | Stop reason: HOSPADM

## 2018-04-18 RX ADMIN — CEFAZOLIN SODIUM 2 G: 2 INJECTION, SOLUTION INTRAVENOUS at 07:40

## 2018-04-18 RX ADMIN — SODIUM CHLORIDE, POTASSIUM CHLORIDE, SODIUM LACTATE AND CALCIUM CHLORIDE 100 ML/HR: 600; 310; 30; 20 INJECTION, SOLUTION INTRAVENOUS at 10:33

## 2018-04-18 RX ADMIN — BUDESONIDE 0.5 MG: 0.5 INHALANT RESPIRATORY (INHALATION) at 21:11

## 2018-04-18 RX ADMIN — PROMETHAZINE HYDROCHLORIDE 6.25 MG: 25 INJECTION INTRAMUSCULAR; INTRAVENOUS at 10:06

## 2018-04-18 RX ADMIN — SODIUM CHLORIDE, POTASSIUM CHLORIDE, SODIUM LACTATE AND CALCIUM CHLORIDE 100 ML/HR: 600; 310; 30; 20 INJECTION, SOLUTION INTRAVENOUS at 06:42

## 2018-04-18 RX ADMIN — FENTANYL CITRATE 50 MCG: 50 INJECTION, SOLUTION INTRAMUSCULAR; INTRAVENOUS at 10:16

## 2018-04-18 RX ADMIN — GLYCOPYRROLATE 0.4 MG: 0.2 INJECTION, SOLUTION INTRAMUSCULAR; INTRAVENOUS at 08:57

## 2018-04-18 RX ADMIN — PROPOFOL 25 MCG/KG/MIN: 10 INJECTION, EMULSION INTRAVENOUS at 07:40

## 2018-04-18 RX ADMIN — NEOSTIGMINE METHYLSULFATE 1 MG: 1 INJECTION, SOLUTION INTRAVENOUS at 09:05

## 2018-04-18 RX ADMIN — EPHEDRINE SULFATE 10 MG: 50 INJECTION INTRAMUSCULAR; INTRAVENOUS; SUBCUTANEOUS at 07:43

## 2018-04-18 RX ADMIN — FENTANYL CITRATE 100 MCG: 50 INJECTION, SOLUTION INTRAMUSCULAR; INTRAVENOUS at 07:32

## 2018-04-18 RX ADMIN — ROCURONIUM BROMIDE 40 MG: 10 SOLUTION INTRAVENOUS at 07:32

## 2018-04-18 RX ADMIN — PHENYLEPHRINE HYDROCHLORIDE 160 MCG: 10 INJECTION INTRAVENOUS at 07:41

## 2018-04-18 RX ADMIN — OXYCODONE HYDROCHLORIDE AND ACETAMINOPHEN 1 TABLET: 7.5; 325 TABLET ORAL at 15:46

## 2018-04-18 RX ADMIN — LIDOCAINE HYDROCHLORIDE 1 EACH: 40 SOLUTION TOPICAL at 07:33

## 2018-04-18 RX ADMIN — PROPOFOL 160 MG: 10 INJECTION, EMULSION INTRAVENOUS at 07:32

## 2018-04-18 RX ADMIN — EPHEDRINE SULFATE 10 MG: 50 INJECTION INTRAMUSCULAR; INTRAVENOUS; SUBCUTANEOUS at 07:56

## 2018-04-18 RX ADMIN — OXYCODONE HYDROCHLORIDE AND ACETAMINOPHEN 1 TABLET: 7.5; 325 TABLET ORAL at 21:01

## 2018-04-18 RX ADMIN — GLYCOPYRROLATE 0.2 MG: 0.2 INJECTION, SOLUTION INTRAMUSCULAR; INTRAVENOUS at 09:05

## 2018-04-18 RX ADMIN — LIDOCAINE HYDROCHLORIDE 50 MG: 10 INJECTION, SOLUTION EPIDURAL; INFILTRATION; INTRACAUDAL; PERINEURAL at 07:32

## 2018-04-18 RX ADMIN — GABAPENTIN 300 MG: 300 CAPSULE ORAL at 21:01

## 2018-04-18 RX ADMIN — ROCURONIUM BROMIDE 10 MG: 10 SOLUTION INTRAVENOUS at 08:01

## 2018-04-18 RX ADMIN — SODIUM CHLORIDE, POTASSIUM CHLORIDE, SODIUM LACTATE AND CALCIUM CHLORIDE: 600; 310; 30; 20 INJECTION, SOLUTION INTRAVENOUS at 08:40

## 2018-04-18 RX ADMIN — NEOSTIGMINE METHYLSULFATE 3 MG: 1 INJECTION, SOLUTION INTRAVENOUS at 08:57

## 2018-04-18 RX ADMIN — OXYCODONE HYDROCHLORIDE AND ACETAMINOPHEN 1 TABLET: 7.5; 325 TABLET ORAL at 11:40

## 2018-04-18 RX ADMIN — CETIRIZINE HYDROCHLORIDE 10 MG: 10 TABLET, FILM COATED ORAL at 21:01

## 2018-04-18 RX ADMIN — ONDANSETRON 4 MG: 2 INJECTION INTRAMUSCULAR; INTRAVENOUS at 08:47

## 2018-04-18 RX ADMIN — MIDAZOLAM HYDROCHLORIDE 2 MG: 1 INJECTION, SOLUTION INTRAMUSCULAR; INTRAVENOUS at 07:24

## 2018-04-18 RX ADMIN — LIDOCAINE HYDROCHLORIDE 0.5 ML: 10 INJECTION, SOLUTION EPIDURAL; INFILTRATION; INTRACAUDAL; PERINEURAL at 06:42

## 2018-04-18 RX ADMIN — PHENYLEPHRINE HYDROCHLORIDE 80 MCG: 10 INJECTION INTRAVENOUS at 07:39

## 2018-04-18 RX ADMIN — FAMOTIDINE 20 MG: 20 TABLET ORAL at 06:41

## 2018-04-18 RX ADMIN — DEXAMETHASONE SODIUM PHOSPHATE 8 MG: 4 INJECTION, SOLUTION INTRAMUSCULAR; INTRAVENOUS at 07:40

## 2018-04-18 RX ADMIN — FENTANYL CITRATE 50 MCG: 50 INJECTION, SOLUTION INTRAMUSCULAR; INTRAVENOUS at 10:09

## 2018-04-18 RX ADMIN — FENTANYL CITRATE 50 MCG: 50 INJECTION, SOLUTION INTRAMUSCULAR; INTRAVENOUS at 07:47

## 2018-04-18 NOTE — ANESTHESIA PREPROCEDURE EVALUATION
Anesthesia Evaluation     Patient summary reviewed and Nursing notes reviewed   NPO Solid Status: > 8 hours  NPO Liquid Status: > 8 hours           Airway   Mallampati: I  TM distance: >3 FB  Neck ROM: full  No difficulty expected  Dental      Pulmonary    (+) a smoker Current, asthma (MDI PRN NOne recently - sx stable ),   (-) COPD, shortness of breath, recent URI, lung cancer  Cardiovascular     ECG reviewed    (-) past MI, CAD, dysrhythmias, angina, cardiac stents    ROS comment: Sinus rhythm with marked sinus arrhythmia  Otherwise normal ECG    Neuro/Psych  (+) headaches,     (-) seizures (New born no Rx ), TIA, CVA  GI/Hepatic/Renal/Endo    (-) liver disease, no renal disease, diabetes, hypothyroidism    Musculoskeletal     (+) back pain, neck pain, neck stiffness, radiculopathy Left upper extremity      ROS comment: L HNP w leg sx's   Abdominal    Substance History      OB/GYN          Other            Phys Exam Other: Tongue stud - will remove               Anesthesia Plan    ASA 2     general   (Propofol Infusion as part of Anti PONV tech )  intravenous induction   Anesthetic plan and risks discussed with patient.    Plan discussed with CRNA.

## 2018-04-18 NOTE — ANESTHESIA PROCEDURE NOTES
Airway  Urgency: elective    Airway not difficult    General Information and Staff    Patient location during procedure: OR  CRNA: ELE MEJIA    Indications and Patient Condition  Indications for airway management: airway protection    Preoxygenated: yes  MILS not maintained throughout  Mask difficulty assessment: 1 - vent by mask    Final Airway Details  Final airway type: endotracheal airway      Successful airway: ETT  Cuffed: yes   Successful intubation technique: direct laryngoscopy  Endotracheal tube insertion site: oral  Blade: Baig  Blade size: #2  ETT size: 7.0 mm  Cormack-Lehane Classification: grade I - full view of glottis  Placement verified by: chest auscultation and capnometry   Cuff volume (mL): 6  Measured from: lips  ETT to lips (cm): 20  Number of attempts at approach: 1    Additional Comments  Negative epigastric sounds, Breath sound equal bilaterally with symmetric chest rise and fall. Atraumatic, dentition and mucosa unchanged

## 2018-04-18 NOTE — ANESTHESIA POSTPROCEDURE EVALUATION
Patient: Elisa Hugo    Procedure Summary     Date:  04/18/18 Room / Location:   [facility] /  [facility]    Anesthesia Start:  0726 Anesthesia Stop:      Procedure:  CERVICAL DISCECTOMY ANTERIOR WITH FUSION C6-7  BILATERAL (Bilateral Spine Cervical) Diagnosis:       HNP (herniated nucleus pulposus), cervical      (HNP (herniated nucleus pulposus), cervical [M50.20])    Surgeon:  César Peraza MD Provider:  Jorge Catherine MD    Anesthesia Type:  general ASA Status:  2          Anesthesia Type: general  Last vitals  BP   111/83   Temp   97 °F (36.1 °C) (04/18/18 0640)   Pulse   94   Resp   18   SpO2   99%     Post Anesthesia Care and Evaluation    Patient location during evaluation: PACU  Patient participation: complete - patient participated  Level of consciousness: awake and alert  Pain score: 0  Pain management: adequate  Airway patency: patent  Anesthetic complications: No anesthetic complications  PONV Status: none  Cardiovascular status: hemodynamically stable and acceptable  Respiratory status: nonlabored ventilation, acceptable, nasal cannula and oral airway  Hydration status: acceptable

## 2018-04-19 VITALS
OXYGEN SATURATION: 99 % | WEIGHT: 149 LBS | BODY MASS INDEX: 25.44 KG/M2 | DIASTOLIC BLOOD PRESSURE: 57 MMHG | HEART RATE: 75 BPM | HEIGHT: 64 IN | TEMPERATURE: 97.8 F | SYSTOLIC BLOOD PRESSURE: 113 MMHG | RESPIRATION RATE: 16 BRPM

## 2018-04-19 PROCEDURE — 99024 POSTOP FOLLOW-UP VISIT: CPT | Performed by: NEUROLOGICAL SURGERY

## 2018-04-19 PROCEDURE — 94640 AIRWAY INHALATION TREATMENT: CPT

## 2018-04-19 RX ORDER — HYDROCODONE BITARTRATE AND ACETAMINOPHEN 5; 325 MG/1; MG/1
1 TABLET ORAL EVERY 6 HOURS PRN
Qty: 30 TABLET | Refills: 0 | Status: SHIPPED | OUTPATIENT
Start: 2018-04-19 | End: 2018-04-25 | Stop reason: SDUPTHER

## 2018-04-19 RX ADMIN — OXYCODONE HYDROCHLORIDE AND ACETAMINOPHEN 1 TABLET: 7.5; 325 TABLET ORAL at 04:25

## 2018-04-19 RX ADMIN — BUDESONIDE 0.5 MG: 0.5 INHALANT RESPIRATORY (INHALATION) at 08:43

## 2018-04-25 RX ORDER — HYDROCODONE BITARTRATE AND ACETAMINOPHEN 5; 325 MG/1; MG/1
1 TABLET ORAL EVERY 6 HOURS PRN
Qty: 30 TABLET | Refills: 0 | Status: SHIPPED | OUTPATIENT
Start: 2018-04-25 | End: 2018-05-17 | Stop reason: SDUPTHER

## 2018-04-25 NOTE — TELEPHONE ENCOUNTER
Called patient to tell her the prescription is ready. She will  in office tomorrow. Placed up front in rx bag.

## 2018-04-25 NOTE — TELEPHONE ENCOUNTER
".Provider:  Bean  Caller: pt  Time of call:   10:57am  Phone #:  [phone]  Surgery:  ACDF C6/7  Surgery Date:  04/18/18  Last visit:   04/05/18  Next visit: 05/17/18    Reason for call:         Pt left msg, \"I need more pain meds\".     SHIRA:     12/22/2017 Hydrocodone/Acetaminophen  325MG/5MG    1985 30 30 Eryn Hallman [facility]  [place] 5 1    01/23/2018 Gabapentin 300MG 1985 60 30 Eryn Hallman [facility]  [place] 1 01/23/2018 Hydrocodone/Acetaminophen  325MG/5MG  1985 30 30 Eryn Hallman [facility]  [place] 5 1 04/09/2018 Gabapentin 300MG 1985 30 30 Mirella Vasquez [facility]  [place] 1 04/19/2018 Hydrocodone/Acetaminophen  325MG/5MG  1985 30 8 César Peraza [facility]  [facility] 19 1    "

## 2018-05-01 ENCOUNTER — TELEPHONE (OUTPATIENT)
Dept: NEUROSURGERY | Facility: CLINIC | Age: 33
End: 2018-05-01

## 2018-05-01 RX ORDER — METHYLPREDNISOLONE 4 MG/1
TABLET ORAL
Qty: 1 EACH | Refills: 0 | Status: SHIPPED | OUTPATIENT
Start: 2018-05-01 | End: 2018-05-17

## 2018-05-01 NOTE — TELEPHONE ENCOUNTER
"Provider:  Stu       Surgery:  ACDF C6-7  Surgery Date:  04/18/18  Last visit:04/05/18     Next visit: 05/17/18    SHIRA:         Reason for call: Pt called and LVM this morning stating she was having intense R sided neck and R shoulder, arm pain.    -returned pt's call she states she is having a sharp like pain in the base of her neck R sided, radiating to her shoulder, traveling to elbow into her pinky and ring finger. - stated there is numbness and tingling as well, arm feels weak and heavy upon trying to use.    -pt states this started about 5 days ago, that its not as intense like before surgery but she wanted to run it by           -pt is taking her Norco 5mg Q6h, Jose Luis 300mg TID w/ mild relief.     -I did explain to pt that he puts a steroid on the inside with the fusion that slowly dissolve and that the time frame would be right around when she started feeling the pain and the nerve have \"woken up\" - but that I would run this by the PA's to make sure, also encouraged her to take it easy- going over restrictions to make sure she wasn't \"over doing\", she stated she was not.             "

## 2018-05-04 NOTE — TELEPHONE ENCOUNTER
Pt called in and LVM with her updated.   Stated the pain is getting better, still in her R arm, but it really only hurts when she turns head to the R.

## 2018-05-17 ENCOUNTER — OFFICE VISIT (OUTPATIENT)
Dept: NEUROSURGERY | Facility: CLINIC | Age: 33
End: 2018-05-17

## 2018-05-17 VITALS — WEIGHT: 147 LBS | TEMPERATURE: 98 F | HEIGHT: 64 IN | BODY MASS INDEX: 25.1 KG/M2

## 2018-05-17 DIAGNOSIS — Z98.1 S/P CERVICAL SPINAL FUSION: Primary | ICD-10-CM

## 2018-05-17 PROCEDURE — 99024 POSTOP FOLLOW-UP VISIT: CPT | Performed by: NEUROLOGICAL SURGERY

## 2018-05-17 RX ORDER — HYDROCODONE BITARTRATE AND ACETAMINOPHEN 5; 325 MG/1; MG/1
1 TABLET ORAL EVERY 6 HOURS PRN
Qty: 45 TABLET | Refills: 0 | Status: SHIPPED | OUTPATIENT
Start: 2018-05-17 | End: 2018-06-05 | Stop reason: SDUPTHER

## 2018-05-17 RX ORDER — GABAPENTIN 300 MG/1
CAPSULE ORAL
Status: ON HOLD | COMMUNITY
Start: 2018-04-09 | End: 2018-09-06

## 2018-05-17 NOTE — PROGRESS NOTES
Subjective   Elisa Hugo is a 32 y.o. female who presents for follow up of  ACDF C6-7 on 4/18/18.     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  MRI scan showed a central disc herniation at C6-7.  Myelogram in February showed a large left C6-7 discogenic osteophyte in the ventral spinal canal which seemed to be the source of pain.  An event in March caused her neck to pop in developed rather immediate pain radiating to the right shoulder and arm, and an MRI scan after that showed progression of the herniation to include a right sided component to the disc herniation.  ACDF at C6-7 was performed on 4/18/18.    Since surgery her left upper extremity pain is relieved, but her right sided pain is persistent and her arm feels heavy to try to lift.  Although her anterior cervical incision is healing well.  She has pain sensations in her anterior neck around the incision.    The following portions of the patient's history were reviewed, updated as appropriate and approved: allergies, current medications, past family history, past medical history, past social history, past surgical history, review of systems and problem list.     Review of Systems   Constitutional: Negative for activity change, appetite change, chills, diaphoresis, fatigue, fever and unexpected weight change.   HENT: Negative for congestion, dental problem, drooling, ear discharge, ear pain, facial swelling, hearing loss, mouth sores, nosebleeds, postnasal drip, rhinorrhea, sinus pressure, sneezing, sore throat, tinnitus, trouble swallowing and voice change.    Eyes: Negative for photophobia, pain, discharge, redness, itching and visual disturbance.   Respiratory: Negative for apnea, cough, choking, chest tightness, shortness of breath, wheezing and stridor.     Cardiovascular: Negative for chest pain, palpitations and leg swelling.   Gastrointestinal: Negative for abdominal distention, abdominal pain, anal bleeding, blood in stool, constipation, diarrhea, nausea, rectal pain and vomiting.   Endocrine: Negative for cold intolerance, heat intolerance, polydipsia, polyphagia and polyuria.   Genitourinary: Negative for decreased urine volume, difficulty urinating, dysuria, enuresis, flank pain, frequency, genital sores, hematuria and urgency.   Musculoskeletal: Positive for neck pain (soreness) and neck stiffness (limited ROM- s/p ADCF C6-7). Negative for arthralgias, back pain, gait problem, joint swelling and myalgias.   Skin: Negative for color change, pallor, rash and wound.   Allergic/Immunologic: Negative for environmental allergies, food allergies and immunocompromised state.   Neurological: Negative for dizziness, tremors, seizures, syncope, facial asymmetry, speech difficulty, weakness, light-headedness, numbness and headaches.   Hematological: Negative for adenopathy. Does not bruise/bleed easily.   Psychiatric/Behavioral: Negative for agitation, behavioral problems, confusion, decreased concentration, dysphoric mood, hallucinations, self-injury, sleep disturbance and suicidal ideas. The patient is not nervous/anxious and is not hyperactive.    All other systems reviewed and are negative.      Objective    NEUROLOGICAL EXAMINATION:    Able to elevate the right arm at the elbow and shoulder, but feels subjectively heavy to her.  Anterior cervical incision is healing well.    Assessment   1 month postop ACDF C6-7.  Relief of pain on the left.  Persistent or worsening pain in the right upper extremity.  Renew Norco 5.       Plan   Cervical MRI scan to be sure there is no other source of radiculopathy other than what was treated at surgery.  X-rays of the cervical spine.  Follow-up in the office after the images are done.       César Peraza MD

## 2018-05-22 ENCOUNTER — HOSPITAL ENCOUNTER (OUTPATIENT)
Dept: GENERAL RADIOLOGY | Facility: HOSPITAL | Age: 33
Discharge: HOME | End: 2018-05-22
Attending: NEUROLOGICAL SURGERY

## 2018-05-22 ENCOUNTER — HOSPITAL ENCOUNTER (OUTPATIENT)
Dept: MRI IMAGING | Facility: HOSPITAL | Age: 33
Discharge: HOME | End: 2018-05-22
Attending: NEUROLOGICAL SURGERY | Admitting: NEUROLOGICAL SURGERY

## 2018-05-22 DIAGNOSIS — Z98.1 S/P CERVICAL SPINAL FUSION: ICD-10-CM

## 2018-05-22 PROCEDURE — 72040 X-RAY EXAM NECK SPINE 2-3 VW: CPT

## 2018-05-22 PROCEDURE — A9577 INJ MULTIHANCE: HCPCS | Performed by: NEUROLOGICAL SURGERY

## 2018-05-22 PROCEDURE — 0 GADOBENATE DIMEGLUMINE 529 MG/ML SOLUTION: Performed by: NEUROLOGICAL SURGERY

## 2018-05-22 PROCEDURE — 72156 MRI NECK SPINE W/O & W/DYE: CPT

## 2018-05-22 RX ADMIN — GADOBENATE DIMEGLUMINE 13 ML: 529 INJECTION, SOLUTION INTRAVENOUS at 12:30

## 2018-05-24 ENCOUNTER — OFFICE VISIT (OUTPATIENT)
Dept: NEUROSURGERY | Facility: CLINIC | Age: 33
End: 2018-05-24

## 2018-05-24 VITALS — TEMPERATURE: 98 F | WEIGHT: 145 LBS | HEIGHT: 64 IN | BODY MASS INDEX: 24.75 KG/M2

## 2018-05-24 DIAGNOSIS — Z98.1 S/P CERVICAL SPINAL FUSION: Primary | ICD-10-CM

## 2018-05-24 PROCEDURE — 99024 POSTOP FOLLOW-UP VISIT: CPT | Performed by: NEUROLOGICAL SURGERY

## 2018-05-24 NOTE — PROGRESS NOTES
Subjective   Elisa Hugo is a 32 y.o. female who presents for follow up of  ACDF C6-7 on 4/18/18.     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  MRI scan showed a central disc herniation at C6-7.  Myelogram in February showed a large left C6-7 discogenic osteophyte in the ventral spinal canal which seemed to be the source of pain.  An event in March caused her neck to pop in developed rather immediate pain radiating to the right shoulder and arm, and an MRI scan after that showed progression of the herniation to include a right sided component to the disc herniation.  ACDF at C6-7 was performed on 4/18/18.     Since surgery her left upper extremity pain is relieved, but her right sided pain is persistent and her arm feels heavy to try to lift.  Although her anterior cervical incision is healing well, she has pain sensations in her anterior neck around the incision.     Cervical MRI scan was done showing the surgical site to look good and no other problems above or below it.  Cervical spine x-rays show excellent position of the interbody graft and plate/screw fixation.    She continues to complain of right sided arm and shoulder pain.    The following portions of the patient's history were reviewed, updated as appropriate and approved: allergies, current medications, past family history, past medical history, past social history, past surgical history, review of systems and problem list.     Review of Systems   Constitutional: Negative for activity change, appetite change, chills, diaphoresis, fatigue, fever and unexpected weight change.   HENT: Negative for congestion, dental problem, drooling, ear discharge, ear pain, facial swelling, hearing loss, mouth sores, nosebleeds, postnasal drip, rhinorrhea, sinus pressure,  sneezing, sore throat, tinnitus, trouble swallowing and voice change.    Eyes: Negative for photophobia, pain, discharge, redness, itching and visual disturbance.   Respiratory: Negative for apnea, cough, choking, chest tightness, shortness of breath, wheezing and stridor.    Cardiovascular: Negative for chest pain, palpitations and leg swelling.   Gastrointestinal: Negative for abdominal distention, abdominal pain, anal bleeding, blood in stool, constipation, diarrhea, nausea, rectal pain and vomiting.   Endocrine: Negative for cold intolerance, heat intolerance, polydipsia, polyphagia and polyuria.   Genitourinary: Negative for decreased urine volume, difficulty urinating, dysuria, enuresis, flank pain, frequency, genital sores, hematuria and urgency.   Musculoskeletal: Positive for neck pain and neck stiffness. Negative for arthralgias, back pain, gait problem, joint swelling and myalgias.   Skin: Negative for color change, pallor, rash and wound.   Allergic/Immunologic: Negative for environmental allergies, food allergies and immunocompromised state.   Neurological: Positive for weakness and numbness. Negative for dizziness, tremors, seizures, syncope, facial asymmetry, speech difficulty, light-headedness and headaches.   Hematological: Negative for adenopathy. Does not bruise/bleed easily.   Psychiatric/Behavioral: Negative for agitation, behavioral problems, confusion, decreased concentration, dysphoric mood, hallucinations, self-injury, sleep disturbance and suicidal ideas. The patient is not nervous/anxious and is not hyperactive.    All other systems reviewed and are negative.      Objective    NEUROLOGICAL EXAMINATION:    Able to elevate the right arm at the elbow and shoulder.   is intact.  No focal sensory loss.  Anterior cervical incision well healed with no neck swelling.    Assessment   1 month postop ACDF C5-6.  Residual right upper extremity pain.  No problem noted on postop MRI and cervical  spine x-ray.       Plan   Continue off work 1 more month.  Follow-up in the office in one month.       César Peraza MD

## 2018-06-05 DIAGNOSIS — Z98.1 S/P CERVICAL SPINAL FUSION: ICD-10-CM

## 2018-06-05 RX ORDER — HYDROCODONE BITARTRATE AND ACETAMINOPHEN 5; 325 MG/1; MG/1
1 TABLET ORAL 2 TIMES DAILY
Qty: 45 TABLET | Refills: 0 | Status: SHIPPED | OUTPATIENT
Start: 2018-06-05 | End: 2018-09-13

## 2018-06-05 NOTE — TELEPHONE ENCOUNTER
Refill given to Dr. Mckenna to sign. Please make patient aware that she is to start weaning narcotics and this refill is for only twice a day

## 2018-06-05 NOTE — TELEPHONE ENCOUNTER
Provider:  Stu       Surgery:  ACDF C6-7  Surgery Date:  04/18/18  Last visit: 05/24/18  Next visit: 06/28/18    SHIRA: 04/19/2018 Hydrocodone/Acetaminophen  325MG/5MG  1985 30 8 César Peraza [facility]    04/26/2018 Hydrocodone/Acetaminophen  325MG/5MG  1985 30 7 Jeremiah De Dios [facility] [employer]    05/17/2018 Hydrocodone/Acetaminophen  325MG/5MG  1985 45 12 César Peraza [facility] [employer]  [facility]      Reason: pt LV requesting a RF on norco 5mg    -Med pended.     -pt did not state in VM if she would p/u or mail.

## 2018-06-28 ENCOUNTER — OFFICE VISIT (OUTPATIENT)
Dept: NEUROSURGERY | Facility: CLINIC | Age: 33
End: 2018-06-28

## 2018-06-28 VITALS — BODY MASS INDEX: 24.07 KG/M2 | WEIGHT: 141 LBS | HEIGHT: 64 IN | TEMPERATURE: 98 F

## 2018-06-28 DIAGNOSIS — M51.26 HERNIATION OF INTERVERTEBRAL DISC OF LUMBAR SPINE: ICD-10-CM

## 2018-06-28 DIAGNOSIS — Z98.1 S/P CERVICAL SPINAL FUSION: Primary | ICD-10-CM

## 2018-06-28 PROCEDURE — 99024 POSTOP FOLLOW-UP VISIT: CPT | Performed by: NEUROLOGICAL SURGERY

## 2018-06-28 NOTE — PROGRESS NOTES
Subjective   Elisa Hugo is a 32 y.o. female who presents for follow up of  ACDF C6-7 on 4/18/18.     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  She still having pain in her neck and her right shoulder and arm somewhat, her left upper extremity pain improved.  She bent down to  an object about 2 weeks ago and developed severe pain into the right hip and leg.  She had an MRI scan of her lumbar spine done in October 2017 that showed a disc herniation focally at L5-S1 on the right.  She has tingling and numbness into her leg and foot, with numbness of the plantar surface and tingling on the dorsal surface of the foot on the right.    The following portions of the patient's history were reviewed, updated as appropriate and approved: allergies, current medications, past family history, past medical history, past social history, past surgical history, review of systems and problem list.     Review of Systems   Constitutional: Negative for activity change, appetite change, chills, diaphoresis, fatigue, fever and unexpected weight change.   HENT: Negative for congestion, dental problem, drooling, ear discharge, ear pain, facial swelling, hearing loss, mouth sores, nosebleeds, postnasal drip, rhinorrhea, sinus pressure, sneezing, sore throat, tinnitus, trouble swallowing and voice change.    Eyes: Negative for photophobia, pain, discharge, redness, itching and visual disturbance.   Respiratory: Negative for apnea, cough, choking, chest tightness, shortness of breath, wheezing and stridor.    Cardiovascular: Negative for chest pain, palpitations and leg swelling.   Gastrointestinal: Negative for abdominal distention, abdominal pain, anal bleeding, blood in stool, constipation, diarrhea, nausea, rectal pain and vomiting.   Endocrine: Negative for cold intolerance, heat intolerance, polydipsia, polyphagia and polyuria.    Genitourinary: Negative for decreased urine volume, difficulty urinating, dysuria, enuresis, flank pain, frequency, genital sores, hematuria and urgency.   Musculoskeletal: Negative for arthralgias, back pain, gait problem, joint swelling, myalgias, neck pain and neck stiffness.   Skin: Negative for color change, pallor, rash and wound.   Allergic/Immunologic: Negative for environmental allergies, food allergies and immunocompromised state.   Neurological: Negative for dizziness, tremors, seizures, syncope, facial asymmetry, speech difficulty, weakness, light-headedness, numbness and headaches.   Hematological: Negative for adenopathy. Does not bruise/bleed easily.   Psychiatric/Behavioral: Negative for agitation, behavioral problems, confusion, decreased concentration, dysphoric mood, hallucinations, self-injury, sleep disturbance and suicidal ideas. The patient is not nervous/anxious and is not hyperactive.    All other systems reviewed and are negative.      Objective    NEUROLOGICAL EXAMINATION:    SLR positive on the right at about 15-20°, negative on the left except for back pain.  Ankle jerks are intact bilaterally.  Plantar surface foot numbness on the right.    Assessment   2 months postop ACDF C6-7 with persistent neck pain.  New onset of right S1 radiculopathy.       Plan   Physical therapy for her lumbar spine.  Follow-up in 4 weeks.  X-rays of the cervical spine on follow-up in July.  Unable to work at this time.       César Peraza MD

## 2018-07-10 ENCOUNTER — TELEPHONE (OUTPATIENT)
Dept: NEUROSURGERY | Facility: CLINIC | Age: 33
End: 2018-07-10

## 2018-07-10 NOTE — TELEPHONE ENCOUNTER
Provider:  Stu  Caller: patient  Time of call:  11:00   Phone #:  [phone]  Surgery:  ACDF C6-7  Surgery Date:  04/18/18  Last visit:   06/28/18  Next visit: 07/26/18    SHIRA:         Reason for call:     Patient called and wants to know if it's ok to drive, and she said she is going to PT and they have her working on flexion and extension and she wants to know if that's ok?

## 2018-07-10 NOTE — TELEPHONE ENCOUNTER
Patient can drive as long as she is not on pain medicine.  Gentle/passive range of motion of the neck is okay at this point.

## 2018-07-10 NOTE — TELEPHONE ENCOUNTER
Called pt and advised.  She wants to know if she can bend to touch her toes and exercises like that.  Physical therapy is trying to have her do these types of things and she does not want to cause herself harm.

## 2018-07-26 ENCOUNTER — HOSPITAL ENCOUNTER (OUTPATIENT)
Dept: GENERAL RADIOLOGY | Facility: HOSPITAL | Age: 33
Discharge: HOME | End: 2018-07-26
Attending: NEUROLOGICAL SURGERY | Admitting: NEUROLOGICAL SURGERY

## 2018-07-26 ENCOUNTER — OFFICE VISIT (OUTPATIENT)
Dept: NEUROSURGERY | Facility: CLINIC | Age: 33
End: 2018-07-26

## 2018-07-26 VITALS — BODY MASS INDEX: 24.07 KG/M2 | WEIGHT: 141 LBS | HEIGHT: 64 IN

## 2018-07-26 DIAGNOSIS — Z98.1 STATUS POST CERVICAL SPINAL FUSION: Primary | ICD-10-CM

## 2018-07-26 PROBLEM — M50.20 HNP (HERNIATED NUCLEUS PULPOSUS), CERVICAL: Status: RESOLVED | Noted: 2018-02-21 | Resolved: 2018-07-26

## 2018-07-26 PROBLEM — M50.20 HERNIATION OF CERVICAL INTERVERTEBRAL DISC: Status: RESOLVED | Noted: 2017-11-30 | Resolved: 2018-07-26

## 2018-07-26 PROCEDURE — 72040 X-RAY EXAM NECK SPINE 2-3 VW: CPT

## 2018-07-26 PROCEDURE — 99213 OFFICE O/P EST LOW 20 MIN: CPT | Performed by: NEUROLOGICAL SURGERY

## 2018-07-26 NOTE — PROGRESS NOTES
Subjective   Elisa Hugo is a 32 y.o. female who presents for follow up of  ACDF C6-7 on 4/18/18, recent onset right hip and leg pain.     Cervical spine x-rays performed today show excellent position of the interbody graft and plate and screw fixation at C6-7.  It is too early to see any sign of fusion.    Her right lower extremity pain began about 6 weeks ago.  She has had 3 visits to physical therapy and there is no significant change in the right lumbar radiculopathy yet.  Last year in October and MRI scan showed a focal disc herniation L5-S1 on the right.    The following portions of the patient's history were reviewed, updated as appropriate and approved: allergies, current medications, past family history, past medical history, past social history, past surgical history, review of systems and problem list.     Review of Systems   Constitutional: Negative for activity change, appetite change, chills, diaphoresis, fatigue, fever and unexpected weight change.   HENT: Negative for congestion, dental problem, drooling, ear discharge, ear pain, facial swelling, hearing loss, mouth sores, nosebleeds, postnasal drip, rhinorrhea, sinus pressure, sneezing, sore throat, tinnitus, trouble swallowing and voice change.    Eyes: Negative for photophobia, pain, discharge, redness, itching and visual disturbance.   Respiratory: Negative for apnea, cough, choking, chest tightness, shortness of breath, wheezing and stridor.    Cardiovascular: Negative for chest pain, palpitations and leg swelling.   Gastrointestinal: Negative for abdominal distention, abdominal pain, anal bleeding, blood in stool, constipation, diarrhea, nausea, rectal pain and vomiting.   Endocrine: Negative for cold intolerance, heat intolerance, polydipsia, polyphagia and polyuria.   Genitourinary: Negative for decreased urine volume, difficulty urinating, dysuria, enuresis, flank pain, frequency, genital sores, hematuria, pelvic pain and urgency.    Musculoskeletal: Negative for arthralgias, back pain, gait problem, joint swelling, myalgias, neck pain and neck stiffness.   Skin: Negative for color change, pallor, rash and wound.   Allergic/Immunologic: Negative for environmental allergies, food allergies and immunocompromised state.   Neurological: Positive for weakness, numbness and headaches. Negative for dizziness, tremors, seizures, syncope, facial asymmetry, speech difficulty and light-headedness.   Hematological: Negative for adenopathy. Does not bruise/bleed easily.   Psychiatric/Behavioral: Negative for agitation, behavioral problems, confusion, decreased concentration, dysphoric mood, hallucinations, self-injury, sleep disturbance and suicidal ideas. The patient is not nervous/anxious and is not hyperactive.        Objective    NEUROLOGICAL EXAMINATION:    SLR positive on the right.  Plantar foot surface numbness on the right.  Absent ankle jerks bilaterally.    Assessment   Possible recurrent herniated disc L5-S1 right.  3 months postop ACDF C6-7 with a good result so far.       Plan   Complete physical therapy treatment for the lumbar spine and follow-up in the office in one month.       César Peraza MD

## 2018-08-23 ENCOUNTER — OFFICE VISIT (OUTPATIENT)
Dept: NEUROSURGERY | Facility: CLINIC | Age: 33
End: 2018-08-23

## 2018-08-23 VITALS — BODY MASS INDEX: 23.9 KG/M2 | WEIGHT: 140 LBS | TEMPERATURE: 98 F | HEIGHT: 64 IN

## 2018-08-23 DIAGNOSIS — M51.26 HERNIATION OF INTERVERTEBRAL DISC OF LUMBAR SPINE: Primary | ICD-10-CM

## 2018-08-23 DIAGNOSIS — Z98.1 S/P CERVICAL SPINAL FUSION: ICD-10-CM

## 2018-08-23 PROBLEM — G47.9 SLEEP DISORDER: Status: ACTIVE | Noted: 2018-08-23

## 2018-08-23 PROBLEM — K21.9 GASTRO-ESOPHAGEAL REFLUX DISEASE WITHOUT ESOPHAGITIS: Status: ACTIVE | Noted: 2018-08-23

## 2018-08-23 PROBLEM — M54.30 SCIATICA: Status: ACTIVE | Noted: 2018-08-23

## 2018-08-23 PROBLEM — M51.9 INTERVERTEBRAL DISC DISORDER: Status: ACTIVE | Noted: 2018-08-23

## 2018-08-23 PROBLEM — M54.2 NECK PAIN: Status: ACTIVE | Noted: 2018-08-23

## 2018-08-23 PROCEDURE — 99213 OFFICE O/P EST LOW 20 MIN: CPT | Performed by: NEUROLOGICAL SURGERY

## 2018-08-23 RX ORDER — BACLOFEN 10 MG/1
TABLET ORAL
Status: ON HOLD | COMMUNITY
Start: 2018-08-20 | End: 2018-09-06

## 2018-08-23 NOTE — PROGRESS NOTES
Subjective   Elisa Hugo is a 32 y.o. female who presents for follow up of  right hip and leg pain.     The patient has had a right sciatic pain syndrome for about 10 weeks.  She's been to physical therapy for 8 visits and is no better.  About 10 months ago MRI scan showed a focal disc herniation L5-S1 on the right.  She had an ACDF at C6-7 on 4/18/18 and slow improvement afterwards but it seems to be finally effective.    The following portions of the patient's history were reviewed, updated as appropriate and approved: allergies, current medications, past family history, past medical history, past social history, past surgical history, review of systems and problem list.     Review of Systems   Constitutional: Negative for activity change, appetite change, chills, diaphoresis, fatigue, fever and unexpected weight change.   HENT: Negative for congestion, dental problem, drooling, ear discharge, ear pain, facial swelling, hearing loss, mouth sores, nosebleeds, postnasal drip, rhinorrhea, sinus pressure, sneezing, sore throat, tinnitus, trouble swallowing and voice change.    Eyes: Negative for photophobia, pain, discharge, redness, itching and visual disturbance.   Respiratory: Negative for apnea, cough, choking, chest tightness, shortness of breath, wheezing and stridor.    Cardiovascular: Negative for chest pain, palpitations and leg swelling.   Gastrointestinal: Negative for abdominal distention, abdominal pain, anal bleeding, blood in stool, constipation, diarrhea, nausea, rectal pain and vomiting.   Endocrine: Negative for cold intolerance, heat intolerance, polydipsia, polyphagia and polyuria.   Genitourinary: Negative for decreased urine volume, difficulty urinating, dysuria, enuresis, flank pain, frequency, genital sores, hematuria, pelvic pain and urgency.   Musculoskeletal: Positive for back pain. Negative for arthralgias, gait problem, joint swelling, myalgias, neck pain and neck stiffness.   Skin:  Negative for color change, pallor, rash and wound.   Allergic/Immunologic: Negative for environmental allergies, food allergies and immunocompromised state.   Neurological: Positive for weakness, numbness and headaches. Negative for dizziness, tremors, seizures, syncope, facial asymmetry, speech difficulty and light-headedness.   Hematological: Negative for adenopathy. Does not bruise/bleed easily.   Psychiatric/Behavioral: Negative for agitation, behavioral problems, confusion, decreased concentration, dysphoric mood, hallucinations, self-injury, sleep disturbance and suicidal ideas. The patient is not nervous/anxious and is not hyperactive.    All other systems reviewed and are negative.      Objective    NEUROLOGICAL EXAMINATION:    Mildly positive SLR on the right.  Plantar numbness on the right foot.  Absent ankle jerks bilaterally.    Assessment   Symptoms of right S1 radiculopathy due to probable herniated disc L5-S1 right.       Plan   In this case, rather than rely on last years MRI scan for this year symptoms, I recommend a lumbar myelogram to see if the L5-S1 right disc herniation is the cause of current symptoms and produces significant nerve root compression such that surgery might be considered.  It is also to rule out disc herniation at another level that was not evident last year.       César Peraza MD

## 2018-08-23 NOTE — PATIENT INSTRUCTIONS
Myelogram  A myelogram is an imaging test. This test looks at:  · Your spinal cord.  · The places where nerves attach to your spinal cord (nerve roots).    A dye (contrast material) is put into your spine before the x-ray. This provides a clearer image for your doctor to see.  You may need this test if you have a spinal cord problem that cannot be diagnosed with other imaging tests. You may also have this test to check your spine after surgery.  What happens before the procedure?  · Follow instructions from your doctor about what you cannot eat or drink. You may be asked to drink more fluids.  · Ask your doctor about changing or stopping your normal medicines. This is important if you take diabetes medicines or blood thinners.  · Plan to have someone take you home after the test.  · If you will be going home right after the test, plan to have someone with you for 24 hours.  What happens during the procedure?  · You will lie face down on a table.  · Your doctor will find the best injection site on your spine. This is most often in the lower back.  · This area will be washed with soap.  · You will be given a medicine to numb the area (local anesthetic).  · Your doctor will place a long needle into the space around your spinal cord (subarachnoid space).  · A sample of spinal fluid may be taken. This may be sent to the laboratory for testing.  · The dye will be injected into the space around your spinal cord.  · The exam table may be tilted. This helps the dye flow up or down your spine.  · The x-ray will take images of your spinal cord.  · A bandage (dressing) may be placed over where the dye was injected.  The procedure may vary among doctors and hospitals.  What happens after the procedure?  · Your blood pressure, heart rate, breathing rate, and blood oxygen level may be checked often until the medicines you were given have worn off.  · Lie flat with your head raised (elevated). This lowers the risk of  headache.  · It is your responsibility to get the results of your test. Ask your doctor or the department doing the test when your results will be ready.  This information is not intended to replace advice given to you by your health care provider. Make sure you discuss any questions you have with your health care provider.  Document Released: 09/26/2009 Document Revised: 05/02/2017 Document Reviewed: 09/29/2016  [employer] Interactive Patient Education © 2018 [employer]    Myelogram, Care After  These instructions give you information about caring for yourself after your procedure. Your doctor may also give you more specific instructions. Call your doctor if you have any problems or questions after your procedure.  Follow these instructions at home:  · Drink enough fluid to keep your pee (urine) clear or pale yellow.  · Rest as told by your doctor.  · Lie flat with your head slightly raised (elevated).  · Do not bend, lift, or do any hard activities for 24-48 hours or as told by your doctor.  · Take over-the-counter and prescription medicines only as told by your doctor.  · Take care of and remove your bandage (dressing) as told by your doctor.  · Bathe or shower as told by your doctor.  Contact a health care provider if:  · You have a fever.  · You have a headache that lasts longer than 24 hours.  · You feel sick to your stomach (nauseous).  · You throw up (vomit).  · Your neck is stiff.  · Your legs feel numb.  · You cannot pee.  · You cannot poop (have a bowel movement).  · You have a rash.  · You are itchy or sneezing.  Get help right away if:  · You have new symptoms or your symptoms get worse.  · You have a seizure.  · You have trouble breathing.  This information is not intended to replace advice given to you by your health care provider. Make sure you discuss any questions you have with your health care provider.  Document Released: 09/26/2009 Document Revised: 08/17/2017 Document Reviewed:  09/29/2016  [employer] Interactive Patient Education © 2018 [employer]          If you have any questions in regards to your visit with  today, please do not hesitate to contact our office. Ask to speak with a CMA for any clinical questions you may have.    Antoinette Ibarra, [facility]    [phone]

## 2018-08-28 PROBLEM — M51.26 HERNIATION OF INTERVERTEBRAL DISC OF LUMBAR SPINE: Status: ACTIVE | Noted: 2018-08-28

## 2018-09-06 ENCOUNTER — APPOINTMENT (OUTPATIENT)
Dept: CT IMAGING | Facility: HOSPITAL | Age: 33
End: 2018-09-06

## 2018-09-06 ENCOUNTER — HOSPITAL ENCOUNTER (OUTPATIENT)
Facility: HOSPITAL | Age: 33
Discharge: HOME | End: 2018-09-06
Attending: RADIOLOGY | Admitting: NEUROLOGICAL SURGERY

## 2018-09-06 VITALS
RESPIRATION RATE: 16 BRPM | DIASTOLIC BLOOD PRESSURE: 70 MMHG | SYSTOLIC BLOOD PRESSURE: 99 MMHG | TEMPERATURE: 97.7 F | WEIGHT: 138.89 LBS | HEIGHT: 64 IN | HEART RATE: 83 BPM | BODY MASS INDEX: 23.71 KG/M2 | OXYGEN SATURATION: 98 %

## 2018-09-06 DIAGNOSIS — M51.26 HERNIATION OF INTERVERTEBRAL DISC OF LUMBAR SPINE: ICD-10-CM

## 2018-09-06 LAB — B-HCG UR QL: NEGATIVE

## 2018-09-06 PROCEDURE — 62304 MYELOGRAPHY LUMBAR INJECTION: CPT | Performed by: RADIOLOGY

## 2018-09-06 PROCEDURE — 72132 CT LUMBAR SPINE W/DYE: CPT

## 2018-09-06 PROCEDURE — 81025 URINE PREGNANCY TEST: CPT | Performed by: RADIOLOGY

## 2018-09-06 PROCEDURE — 0 IOPAMIDOL 41 % SOLUTION: Performed by: RADIOLOGY

## 2018-09-06 RX ORDER — GABAPENTIN 300 MG/1
300 CAPSULE ORAL 2 TIMES DAILY PRN
COMMUNITY
End: 2018-10-16

## 2018-09-06 RX ORDER — LIDOCAINE HYDROCHLORIDE 10 MG/ML
INJECTION, SOLUTION INFILTRATION; PERINEURAL AS NEEDED
Status: DISCONTINUED | OUTPATIENT
Start: 2018-09-06 | End: 2018-09-06 | Stop reason: HOSPADM

## 2018-09-06 NOTE — DISCHARGE INSTR - ACTIVITY
Encourage oral intake (i.e. Water)  No strenuous activity or heavy lifting (nothing over 20 lbs) for 24 hrs.    Take CD of images with you to your next appointment

## 2018-09-13 ENCOUNTER — OFFICE VISIT (OUTPATIENT)
Dept: NEUROSURGERY | Facility: CLINIC | Age: 33
End: 2018-09-13

## 2018-09-13 VITALS — WEIGHT: 138 LBS | BODY MASS INDEX: 23.56 KG/M2 | TEMPERATURE: 98 F | HEIGHT: 64 IN

## 2018-09-13 DIAGNOSIS — M51.26 HERNIATION OF INTERVERTEBRAL DISC OF LUMBAR SPINE: Primary | ICD-10-CM

## 2018-09-13 PROCEDURE — 99213 OFFICE O/P EST LOW 20 MIN: CPT | Performed by: NEUROLOGICAL SURGERY

## 2018-09-13 NOTE — PROGRESS NOTES
Subjective   Elisa Hugo is a 32 y.o. female who presents for follow up of  right hip and leg pain.     The patient has had a chronic right back, hip, and leg pain syndrome for about 2 years, and it worsened about 3 months ago.  MRI scan last year showed a herniated disc at L5-S1 on the right.  Lumbar myelogram shows a herniated disc at L5-S1 on the right that elevates the right S1 nerve root.  This is best seen on the CT imaging.  It is also evident on the routine films by Dr. Santana.    She had an ACDF at C6-7 last April and has had slow but steady improvement.    The following portions of the patient's history were reviewed, updated as appropriate and approved: allergies, current medications, past family history, past medical history, past social history, past surgical history, review of systems and problem list.     Review of Systems   Constitutional: Negative for activity change, appetite change, chills, diaphoresis, fatigue, fever and unexpected weight change.   HENT: Negative for congestion, dental problem, drooling, ear discharge, ear pain, facial swelling, hearing loss, mouth sores, nosebleeds, postnasal drip, rhinorrhea, sinus pressure, sneezing, sore throat, tinnitus, trouble swallowing and voice change.    Eyes: Negative for photophobia, pain, discharge, redness, itching and visual disturbance.   Respiratory: Negative for apnea, cough, choking, chest tightness, shortness of breath, wheezing and stridor.    Cardiovascular: Negative for chest pain, palpitations and leg swelling.   Gastrointestinal: Negative for abdominal distention, abdominal pain, anal bleeding, blood in stool, constipation, diarrhea, nausea, rectal pain and vomiting.   Endocrine: Negative for cold intolerance, heat intolerance, polydipsia, polyphagia and polyuria.   Genitourinary: Negative for decreased urine volume, difficulty urinating, dysuria, enuresis, flank pain, frequency, genital sores, hematuria and urgency.   Musculoskeletal:  Positive for back pain. Negative for arthralgias, gait problem, joint swelling, myalgias, neck pain and neck stiffness.   Skin: Negative for color change, pallor, rash and wound.   Allergic/Immunologic: Negative for environmental allergies, food allergies and immunocompromised state.   Neurological: Positive for weakness, numbness and headaches. Negative for dizziness, tremors, seizures, syncope, facial asymmetry, speech difficulty and light-headedness.   Hematological: Negative for adenopathy. Does not bruise/bleed easily.   Psychiatric/Behavioral: Negative for agitation, behavioral problems, confusion, decreased concentration, dysphoric mood, hallucinations, self-injury, sleep disturbance and suicidal ideas. The patient is not nervous/anxious and is not hyperactive.    All other systems reviewed and are negative.      Objective    NEUROLOGICAL EXAMINATION:    SLR is mildly positive on the right and there is plantar numbness of the right foot.  Ankle jerks absent bilaterally.    Assessment   Chronic herniated disc L5-S1 on the right, recent increase in degree of right sciatic pain.       Plan   Surgery was discussed, L5-S1 right discectomy would be the appropriate procedure.  I've explained to her the surgery and the complications and the possibility that it may not resolve her pain.  She'll think about whether she wants to undergo surgery and if she decides to do so I told her to call the office for scheduling for right L5-S1 discectomy.       César Peraza MD

## 2018-09-13 NOTE — PATIENT INSTRUCTIONS
Microdiskectomy  There is a disk between each of the bones in your spine (vertebrae). This disk acts like a cushion between your bones. A disk can bulge out (herniated disk). This can cause pain. Microdiskectomy is a procedure to remove a disk that bulges out.  What happens before the procedure?  · Ask your doctor about:  ? Changing or stopping your regular medicines. This is important if you take diabetes medicines or blood thinners.  ? Taking medicines such as aspirin and ibuprofen. These medicines can thin your blood. Do not take these medicines before your procedure if your doctor tells you not to.  · Your doctor will do a physical exam.  · You will have blood and pee (urine) samples taken.  · You may have tests, such as:  ? X-rays.  ? MRI.  ? CT scan.  ? Myelogram.  ? Electrocardiogram (ECG).  · Ask your doctor how your surgical site will be marked and identified.  · You may be given antibiotic medicine. This helps prevent infection.  · Follow instructions from your doctor about what you cannot eat or drink.  · Plan to have someone take you home after the procedure.  · If you go home right after the procedure, plan to have someone with you for 24 hours.  What happens during the procedure?  · An IV tube will be put into one of your veins.  · You will be given one or more of the following:  ? A medicine to help you relax (sedative).  ? A medicine to make you fall asleep (general anesthetic).  ? A medicine that is injected into your spine to numb the area below and slightly above the injection site (spinal anesthetic).  · To reduce your risk of infection:  ? Your health care team will wash or sanitize their hands.  ? Your skin will be washed with soap.  · A small surgical cut (incision) will be made in your back. This is done above the disk that bulges out.  · Your back muscles will be pulled aside. This lets the surgeon see the bones of your spine.  · A small piece of bone may be removed. This lets the surgeon  see the disk that bulges out.  · The surgeon will use a microscope to look at the disk and the nerves that are near it.  · The surgeon will remove the part of your disk that is causing problems.  · Your back muscles will be moved back into place.  · Your surgical cut may be closed with stitches (sutures) or skin glue.  · A bandage (dressing) will be put over your surgical cut.  The procedure may vary among doctors and hospitals.  What happens after the procedure?  · You will be given medicine for pain if you need it.  · You may keep getting fluids and medicines through an IV tube.  · Your blood pressure, heart rate, breathing rate, and blood oxygen level will be checked often until the medicines you were given have worn off.  · You may have to wear compression stockings. These stockings help to prevent blood clots and reduce swelling in your legs.  This information is not intended to replace advice given to you by your health care provider. Make sure you discuss any questions you have with your health care provider.  Document Released: 04/12/2012 Document Revised: 05/25/2017 Document Reviewed: 06/01/2016  [employer] Interactive Patient Education © 2018 [employer]    Microdiskectomy, Care After  These instructions give you information about caring for yourself after your procedure. Your doctor may also give you more specific instructions. Call your doctor if you have any problems or questions after your procedure.  Follow these instructions at home:  Medicines  · Take over-the-counter and prescription medicines only as told by your doctor.  · Do not drive or use heavy machinery while taking prescription pain medicine.  · If you were prescribed an antibiotic medicine, take it as told by your doctor. Do not stop taking the antibiotic even if you start to feel better.  Surgical Cut (Incision) Care  · Follow instructions from your doctor about how to take care of your surgical cut. Make sure you:  ? Wash your hands with  soap and water before you change your bandage (dressing). If you cannot use soap and water, use hand .  ? Change your bandage as told by your doctor.  ? Leave stitches (sutures), skin glue, or skin tape (adhesive) strips in place. They may need to stay in place for 2 weeks or longer. If tape strips get loose and curl up, you may trim the loose edges. Do not remove tape strips completely unless your doctor says it is okay.  · Keep your surgical cut clean and dry. Do not take baths, swim, or use a hot tub until your doctor says it is okay.  · If directed, apply ice to the injured area:  ? Put ice in a plastic bag.  ? Place a towel between your skin and the bag.  ? Leave the ice on for 20 minutes, 2-3 times per day.  · Check your surgical cut and the area around it every day for:  ? Redness.  ? Swelling.  ? Fluid.  Physical Activity  · Go back to your normal activities as told by your doctor. Ask your doctor what activities are safe for you. Rest and protect your back as much as possible.  · Do not lift anything that is heavier than 8 lb (3.6 kg) or the weight limit that your doctor tells you until he or she says that it is safe. Do not lift anything over your head.  · Do not twist or bend at the waist until your doctor says it is okay.  · Avoid pushing and pulling motions.  · Do not sit or lie down in the same position for long periods of time.  · Ask your doctor what kinds of exercise you can do to make your back stronger. Do not start to exercise until your doctor says it is okay.  General instructions  · Do not use tobacco products. These include cigarettes, chewing tobacco, or e-cigarettes. If you need help quitting, ask your doctor.  · Wear compression stockings as told by your doctor.  · Keep all follow-up visits as told by your doctor. This is important. This includes any visits with your physical therapist, if this applies.  Contact a doctor if:  · Your pain gets worse.  · Your medicine does not help  your pain.  · You start to have pain or swelling in legs or your feet.  · Your surgical cut is red, swollen, or painful.  · You have fluid, blood, or pus coming from your surgical cut.  · You feel sick to your stomach (nauseous).  · You throw up (vomit).  · You have trouble controlling when you pee (urinate) or poop (have a bowel movement).  · You have a fever.  Get help right away if:  · Your pain is very bad.  · You have trouble breathing.  · You start to have a cough.  · You have chest pain.  These symptoms may be an emergency. Do not wait to see if the symptoms will go away. Get medical help right away. Call your local emergency services (911 in the [place] Do not drive yourself to the hospital.  This information is not intended to replace advice given to you by your health care provider. Make sure you discuss any questions you have with your health care provider.  Document Released: 04/12/2012 Document Revised: 05/25/2017 Document Reviewed: 06/01/2016  [employer] Interactive Patient Education © 2018 [employer]

## 2018-09-24 DIAGNOSIS — M51.26 HERNIATION OF INTERVERTEBRAL DISC OF LUMBAR SPINE: Primary | ICD-10-CM

## 2018-09-24 DIAGNOSIS — M54.31 SCIATICA OF RIGHT SIDE: ICD-10-CM

## 2018-09-24 RX ORDER — SODIUM CHLORIDE, SODIUM LACTATE, POTASSIUM CHLORIDE, CALCIUM CHLORIDE 600; 310; 30; 20 MG/100ML; MG/100ML; MG/100ML; MG/100ML
100 INJECTION, SOLUTION INTRAVENOUS CONTINUOUS
Status: CANCELLED | OUTPATIENT
Start: 2018-09-24

## 2018-10-01 ENCOUNTER — TELEPHONE (OUTPATIENT)
Dept: NEUROSURGERY | Facility: CLINIC | Age: 33
End: 2018-10-01

## 2018-10-01 NOTE — TELEPHONE ENCOUNTER
Provider:  Stu  Caller: patient  Time of call:  10:40   Phone #:  [phone]  Surgery:  Herniation of lumbar disc  Surgery Date:  09/06/18  Last visit:   same  Next visit: None    SHIRA:         Reason for call:   Patient states that she is having increased right hip pain. Called to get more information, however patient did not answer and there is no v/m.

## 2018-10-01 NOTE — TELEPHONE ENCOUNTER
"Spoke with pt, she states that beginning one week ago she began having \"intense, horrible\" waves of pain in her right upper thigh and right lowe back, spontaneous and transient in nature, nothing seems to make this better. She is ready to proceed with surgical interventions that were proposed during her last visit.   "

## 2018-10-02 PROBLEM — M54.31 SCIATICA OF RIGHT SIDE: Status: ACTIVE | Noted: 2018-10-02

## 2018-10-16 ENCOUNTER — APPOINTMENT (OUTPATIENT)
Dept: PREADMISSION TESTING | Facility: HOSPITAL | Age: 33
End: 2018-10-16

## 2018-10-16 ENCOUNTER — ANESTHESIA EVENT (OUTPATIENT)
Dept: PERIOP | Facility: HOSPITAL | Age: 33
End: 2018-10-16

## 2018-10-16 VITALS — BODY MASS INDEX: 23.9 KG/M2 | WEIGHT: 140 LBS | HEIGHT: 64 IN

## 2018-10-16 DIAGNOSIS — M51.26 HERNIATION OF INTERVERTEBRAL DISC OF LUMBAR SPINE: ICD-10-CM

## 2018-10-16 DIAGNOSIS — M54.31 SCIATICA OF RIGHT SIDE: ICD-10-CM

## 2018-10-16 LAB
ANION GAP SERPL CALCULATED.3IONS-SCNC: 3 MMOL/L (ref 3–11)
BUN BLD-MCNC: <5 MG/DL (ref 9–23)
BUN/CREAT SERPL: ABNORMAL (ref 7–25)
CALCIUM SPEC-SCNC: 9.3 MG/DL (ref 8.7–10.4)
CHLORIDE SERPL-SCNC: 106 MMOL/L (ref 99–109)
CO2 SERPL-SCNC: 29 MMOL/L (ref 20–31)
CREAT BLD-MCNC: 0.6 MG/DL (ref 0.6–1.3)
DEPRECATED RDW RBC AUTO: 45.4 FL (ref 37–54)
ERYTHROCYTE [DISTWIDTH] IN BLOOD BY AUTOMATED COUNT: 13 % (ref 11.3–14.5)
GFR SERPL CREATININE-BSD FRML MDRD: 116 ML/MIN/1.73
GLUCOSE BLD-MCNC: 91 MG/DL (ref 70–100)
HCT VFR BLD AUTO: 41.1 % (ref 34.5–44)
HGB BLD-MCNC: 13.8 G/DL (ref 11.5–15.5)
MCH RBC QN AUTO: 32.1 PG (ref 27–31)
MCHC RBC AUTO-ENTMCNC: 33.6 G/DL (ref 32–36)
MCV RBC AUTO: 95.6 FL (ref 80–99)
MRSA DNA SPEC QL NAA+PROBE: NEGATIVE
PLATELET # BLD AUTO: 186 10*3/MM3 (ref 150–450)
PMV BLD AUTO: 12.1 FL (ref 6–12)
POTASSIUM BLD-SCNC: 4.1 MMOL/L (ref 3.5–5.5)
RBC # BLD AUTO: 4.3 10*6/MM3 (ref 3.89–5.14)
SODIUM BLD-SCNC: 138 MMOL/L (ref 132–146)
WBC NRBC COR # BLD: 5.83 10*3/MM3 (ref 3.5–10.8)

## 2018-10-16 PROCEDURE — 80048 BASIC METABOLIC PNL TOTAL CA: CPT | Performed by: PHYSICIAN ASSISTANT

## 2018-10-16 PROCEDURE — 87641 MR-STAPH DNA AMP PROBE: CPT | Performed by: ANESTHESIOLOGY

## 2018-10-16 PROCEDURE — 85027 COMPLETE CBC AUTOMATED: CPT | Performed by: PHYSICIAN ASSISTANT

## 2018-10-16 PROCEDURE — 36415 COLL VENOUS BLD VENIPUNCTURE: CPT

## 2018-10-16 RX ORDER — LIDOCAINE HYDROCHLORIDE 10 MG/ML
0.5 INJECTION, SOLUTION EPIDURAL; INFILTRATION; INTRACAUDAL; PERINEURAL ONCE AS NEEDED
Status: CANCELLED | OUTPATIENT
Start: 2018-10-16

## 2018-10-16 RX ORDER — FAMOTIDINE 20 MG/1
20 TABLET, FILM COATED ORAL ONCE
Status: CANCELLED | OUTPATIENT
Start: 2018-10-16 | End: 2018-10-16

## 2018-10-16 RX ORDER — FAMOTIDINE 10 MG/ML
20 INJECTION, SOLUTION INTRAVENOUS ONCE
Status: CANCELLED | OUTPATIENT
Start: 2018-10-16 | End: 2018-10-16

## 2018-10-16 RX ORDER — SODIUM CHLORIDE, SODIUM LACTATE, POTASSIUM CHLORIDE, CALCIUM CHLORIDE 600; 310; 30; 20 MG/100ML; MG/100ML; MG/100ML; MG/100ML
9 INJECTION, SOLUTION INTRAVENOUS CONTINUOUS
Status: CANCELLED | OUTPATIENT
Start: 2018-10-16

## 2018-10-16 RX ORDER — SODIUM CHLORIDE 0.9 % (FLUSH) 0.9 %
3 SYRINGE (ML) INJECTION EVERY 12 HOURS SCHEDULED
Status: CANCELLED | OUTPATIENT
Start: 2018-10-16

## 2018-10-16 RX ORDER — SODIUM CHLORIDE 0.9 % (FLUSH) 0.9 %
3-10 SYRINGE (ML) INJECTION AS NEEDED
Status: CANCELLED | OUTPATIENT
Start: 2018-10-16

## 2018-10-16 NOTE — DISCHARGE INSTRUCTIONS

## 2018-10-17 ENCOUNTER — ANESTHESIA (OUTPATIENT)
Dept: PERIOP | Facility: HOSPITAL | Age: 33
End: 2018-10-17

## 2018-10-17 ENCOUNTER — HOSPITAL ENCOUNTER (OUTPATIENT)
Facility: HOSPITAL | Age: 33
Setting detail: HOSPITAL OUTPATIENT SURGERY
Discharge: HOME | End: 2018-10-17
Attending: NEUROLOGICAL SURGERY | Admitting: NEUROLOGICAL SURGERY

## 2018-10-17 ENCOUNTER — APPOINTMENT (OUTPATIENT)
Dept: GENERAL RADIOLOGY | Facility: HOSPITAL | Age: 33
End: 2018-10-17

## 2018-10-17 VITALS
DIASTOLIC BLOOD PRESSURE: 72 MMHG | HEART RATE: 82 BPM | TEMPERATURE: 97.3 F | SYSTOLIC BLOOD PRESSURE: 104 MMHG | RESPIRATION RATE: 18 BRPM | WEIGHT: 140 LBS | OXYGEN SATURATION: 96 % | HEIGHT: 64 IN | BODY MASS INDEX: 23.9 KG/M2

## 2018-10-17 DIAGNOSIS — M51.26 HERNIATION OF INTERVERTEBRAL DISC OF LUMBAR SPINE: ICD-10-CM

## 2018-10-17 DIAGNOSIS — M54.31 SCIATICA OF RIGHT SIDE: ICD-10-CM

## 2018-10-17 LAB
B-HCG UR QL: NEGATIVE
INTERNAL NEGATIVE CONTROL: NORMAL
INTERNAL POSITIVE CONTROL: POSITIVE
Lab: NORMAL

## 2018-10-17 PROCEDURE — 76000 FLUOROSCOPY <1 HR PHYS/QHP: CPT

## 2018-10-17 PROCEDURE — 25010000002 NEOSTIGMINE PER 0.5 MG: Performed by: NURSE ANESTHETIST, CERTIFIED REGISTERED

## 2018-10-17 PROCEDURE — 25010000003 CEFAZOLIN IN DEXTROSE 2-4 GM/100ML-% SOLUTION: Performed by: PHYSICIAN ASSISTANT

## 2018-10-17 PROCEDURE — 25010000002 FENTANYL CITRATE (PF) 100 MCG/2ML SOLUTION: Performed by: NURSE ANESTHETIST, CERTIFIED REGISTERED

## 2018-10-17 PROCEDURE — 25010000002 HYDROMORPHONE PER 4 MG: Performed by: NURSE ANESTHETIST, CERTIFIED REGISTERED

## 2018-10-17 PROCEDURE — 25010000002 DEXAMETHASONE PER 1 MG: Performed by: NURSE ANESTHETIST, CERTIFIED REGISTERED

## 2018-10-17 PROCEDURE — 25010000002 MIDAZOLAM PER 1 MG: Performed by: NURSE ANESTHETIST, CERTIFIED REGISTERED

## 2018-10-17 PROCEDURE — 25010000002 KETOROLAC TROMETHAMINE PER 15 MG: Performed by: NEUROLOGICAL SURGERY

## 2018-10-17 PROCEDURE — 99024 POSTOP FOLLOW-UP VISIT: CPT | Performed by: NEUROLOGICAL SURGERY

## 2018-10-17 PROCEDURE — 25010000002 ONDANSETRON PER 1 MG: Performed by: NURSE ANESTHETIST, CERTIFIED REGISTERED

## 2018-10-17 PROCEDURE — 25010000002 PROPOFOL 10 MG/ML EMULSION: Performed by: NURSE ANESTHETIST, CERTIFIED REGISTERED

## 2018-10-17 PROCEDURE — 63030 LAMOT DCMPRN NRV RT 1 LMBR: CPT | Performed by: NEUROLOGICAL SURGERY

## 2018-10-17 PROCEDURE — 63030 LAMOT DCMPRN NRV RT 1 LMBR: CPT | Performed by: PHYSICIAN ASSISTANT

## 2018-10-17 PROCEDURE — 81025 URINE PREGNANCY TEST: CPT | Performed by: ANESTHESIOLOGY

## 2018-10-17 DEVICE — WAX BONE HEMO AESCULAP 2.5GM: Type: IMPLANTABLE DEVICE | Site: SPINE LUMBAR | Status: FUNCTIONAL

## 2018-10-17 RX ORDER — ONDANSETRON 2 MG/ML
4 INJECTION INTRAMUSCULAR; INTRAVENOUS ONCE AS NEEDED
Status: COMPLETED | OUTPATIENT
Start: 2018-10-17 | End: 2018-10-17

## 2018-10-17 RX ORDER — ONDANSETRON 2 MG/ML
INJECTION INTRAMUSCULAR; INTRAVENOUS AS NEEDED
Status: DISCONTINUED | OUTPATIENT
Start: 2018-10-17 | End: 2018-10-17 | Stop reason: SURG

## 2018-10-17 RX ORDER — CEFAZOLIN SODIUM 2 G/100ML
2 INJECTION, SOLUTION INTRAVENOUS ONCE
Status: COMPLETED | OUTPATIENT
Start: 2018-10-17 | End: 2018-10-17

## 2018-10-17 RX ORDER — HYDROCODONE BITARTRATE AND ACETAMINOPHEN 7.5; 325 MG/1; MG/1
1 TABLET ORAL ONCE AS NEEDED
Status: COMPLETED | OUTPATIENT
Start: 2018-10-17 | End: 2018-10-17

## 2018-10-17 RX ORDER — FAMOTIDINE 10 MG/ML
20 INJECTION, SOLUTION INTRAVENOUS ONCE
Status: DISCONTINUED | OUTPATIENT
Start: 2018-10-17 | End: 2018-10-17

## 2018-10-17 RX ORDER — PROMETHAZINE HYDROCHLORIDE 25 MG/1
25 SUPPOSITORY RECTAL ONCE AS NEEDED
Status: DISCONTINUED | OUTPATIENT
Start: 2018-10-17 | End: 2018-10-18 | Stop reason: HOSPADM

## 2018-10-17 RX ORDER — LIDOCAINE HYDROCHLORIDE 10 MG/ML
INJECTION, SOLUTION INFILTRATION; PERINEURAL AS NEEDED
Status: DISCONTINUED | OUTPATIENT
Start: 2018-10-17 | End: 2018-10-17 | Stop reason: SURG

## 2018-10-17 RX ORDER — PROMETHAZINE HYDROCHLORIDE 25 MG/ML
12.5 INJECTION, SOLUTION INTRAMUSCULAR; INTRAVENOUS ONCE AS NEEDED
Status: DISCONTINUED | OUTPATIENT
Start: 2018-10-17 | End: 2018-10-18 | Stop reason: HOSPADM

## 2018-10-17 RX ORDER — FENTANYL CITRATE 50 UG/ML
INJECTION, SOLUTION INTRAMUSCULAR; INTRAVENOUS AS NEEDED
Status: DISCONTINUED | OUTPATIENT
Start: 2018-10-17 | End: 2018-10-17 | Stop reason: SURG

## 2018-10-17 RX ORDER — BUPIVACAINE HYDROCHLORIDE AND EPINEPHRINE 2.5; 5 MG/ML; UG/ML
INJECTION, SOLUTION EPIDURAL; INFILTRATION; INTRACAUDAL; PERINEURAL AS NEEDED
Status: DISCONTINUED | OUTPATIENT
Start: 2018-10-17 | End: 2018-10-17 | Stop reason: HOSPADM

## 2018-10-17 RX ORDER — PROPOFOL 10 MG/ML
VIAL (ML) INTRAVENOUS AS NEEDED
Status: DISCONTINUED | OUTPATIENT
Start: 2018-10-17 | End: 2018-10-17 | Stop reason: SURG

## 2018-10-17 RX ORDER — FENTANYL CITRATE 50 UG/ML
50 INJECTION, SOLUTION INTRAMUSCULAR; INTRAVENOUS
Status: DISCONTINUED | OUTPATIENT
Start: 2018-10-17 | End: 2018-10-18 | Stop reason: HOSPADM

## 2018-10-17 RX ORDER — PROMETHAZINE HYDROCHLORIDE 25 MG/ML
6.25 INJECTION, SOLUTION INTRAMUSCULAR; INTRAVENOUS ONCE AS NEEDED
Status: DISCONTINUED | OUTPATIENT
Start: 2018-10-17 | End: 2018-10-18 | Stop reason: HOSPADM

## 2018-10-17 RX ORDER — SODIUM CHLORIDE 0.9 % (FLUSH) 0.9 %
3-10 SYRINGE (ML) INJECTION AS NEEDED
Status: DISCONTINUED | OUTPATIENT
Start: 2018-10-17 | End: 2018-10-17 | Stop reason: HOSPADM

## 2018-10-17 RX ORDER — SODIUM CHLORIDE 0.9 % (FLUSH) 0.9 %
3 SYRINGE (ML) INJECTION EVERY 12 HOURS SCHEDULED
Status: DISCONTINUED | OUTPATIENT
Start: 2018-10-17 | End: 2018-10-17 | Stop reason: HOSPADM

## 2018-10-17 RX ORDER — KETOROLAC TROMETHAMINE 30 MG/ML
30 INJECTION, SOLUTION INTRAMUSCULAR; INTRAVENOUS ONCE
Status: COMPLETED | OUTPATIENT
Start: 2018-10-17 | End: 2018-10-17

## 2018-10-17 RX ORDER — DEXAMETHASONE SODIUM PHOSPHATE 4 MG/ML
INJECTION, SOLUTION INTRA-ARTICULAR; INTRALESIONAL; INTRAMUSCULAR; INTRAVENOUS; SOFT TISSUE AS NEEDED
Status: DISCONTINUED | OUTPATIENT
Start: 2018-10-17 | End: 2018-10-17 | Stop reason: SURG

## 2018-10-17 RX ORDER — MAGNESIUM HYDROXIDE 1200 MG/15ML
LIQUID ORAL AS NEEDED
Status: DISCONTINUED | OUTPATIENT
Start: 2018-10-17 | End: 2018-10-17 | Stop reason: HOSPADM

## 2018-10-17 RX ORDER — PROMETHAZINE HYDROCHLORIDE 25 MG/1
25 TABLET ORAL ONCE AS NEEDED
Status: DISCONTINUED | OUTPATIENT
Start: 2018-10-17 | End: 2018-10-18 | Stop reason: HOSPADM

## 2018-10-17 RX ORDER — ATRACURIUM BESYLATE 10 MG/ML
INJECTION, SOLUTION INTRAVENOUS AS NEEDED
Status: DISCONTINUED | OUTPATIENT
Start: 2018-10-17 | End: 2018-10-17 | Stop reason: SURG

## 2018-10-17 RX ORDER — MIDAZOLAM HYDROCHLORIDE 1 MG/ML
INJECTION INTRAMUSCULAR; INTRAVENOUS AS NEEDED
Status: DISCONTINUED | OUTPATIENT
Start: 2018-10-17 | End: 2018-10-17 | Stop reason: SURG

## 2018-10-17 RX ORDER — LIDOCAINE HYDROCHLORIDE 10 MG/ML
0.5 INJECTION, SOLUTION EPIDURAL; INFILTRATION; INTRACAUDAL; PERINEURAL ONCE AS NEEDED
Status: COMPLETED | OUTPATIENT
Start: 2018-10-17 | End: 2018-10-17

## 2018-10-17 RX ORDER — SODIUM CHLORIDE, SODIUM LACTATE, POTASSIUM CHLORIDE, CALCIUM CHLORIDE 600; 310; 30; 20 MG/100ML; MG/100ML; MG/100ML; MG/100ML
100 INJECTION, SOLUTION INTRAVENOUS CONTINUOUS
Status: DISCONTINUED | OUTPATIENT
Start: 2018-10-17 | End: 2018-10-18 | Stop reason: HOSPADM

## 2018-10-17 RX ORDER — SODIUM CHLORIDE, SODIUM LACTATE, POTASSIUM CHLORIDE, CALCIUM CHLORIDE 600; 310; 30; 20 MG/100ML; MG/100ML; MG/100ML; MG/100ML
9 INJECTION, SOLUTION INTRAVENOUS CONTINUOUS
Status: DISCONTINUED | OUTPATIENT
Start: 2018-10-17 | End: 2018-10-17

## 2018-10-17 RX ORDER — GLYCOPYRROLATE 0.2 MG/ML
INJECTION INTRAMUSCULAR; INTRAVENOUS AS NEEDED
Status: DISCONTINUED | OUTPATIENT
Start: 2018-10-17 | End: 2018-10-17 | Stop reason: SURG

## 2018-10-17 RX ORDER — FAMOTIDINE 20 MG/1
20 TABLET, FILM COATED ORAL ONCE
Status: COMPLETED | OUTPATIENT
Start: 2018-10-17 | End: 2018-10-17

## 2018-10-17 RX ORDER — HYDROMORPHONE HYDROCHLORIDE 1 MG/ML
0.5 INJECTION, SOLUTION INTRAMUSCULAR; INTRAVENOUS; SUBCUTANEOUS
Status: DISCONTINUED | OUTPATIENT
Start: 2018-10-17 | End: 2018-10-18 | Stop reason: HOSPADM

## 2018-10-17 RX ADMIN — LIDOCAINE HYDROCHLORIDE 0.5 ML: 10 INJECTION, SOLUTION EPIDURAL; INFILTRATION; INTRACAUDAL; PERINEURAL at 11:22

## 2018-10-17 RX ADMIN — DEXAMETHASONE SODIUM PHOSPHATE 8 MG: 4 INJECTION, SOLUTION INTRAMUSCULAR; INTRAVENOUS at 13:30

## 2018-10-17 RX ADMIN — MIDAZOLAM HYDROCHLORIDE 2 MG: 1 INJECTION, SOLUTION INTRAMUSCULAR; INTRAVENOUS at 13:06

## 2018-10-17 RX ADMIN — CEFAZOLIN SODIUM 2 G: 2 INJECTION, SOLUTION INTRAVENOUS at 13:17

## 2018-10-17 RX ADMIN — GLYCOPYRROLATE 0.4 MG: 0.2 INJECTION, SOLUTION INTRAMUSCULAR; INTRAVENOUS at 14:01

## 2018-10-17 RX ADMIN — HYDROCODONE BITARTRATE AND ACETAMINOPHEN 1 TABLET: 7.5; 325 TABLET ORAL at 16:34

## 2018-10-17 RX ADMIN — ONDANSETRON 4 MG: 2 INJECTION INTRAMUSCULAR; INTRAVENOUS at 16:42

## 2018-10-17 RX ADMIN — FENTANYL CITRATE 50 MCG: 50 INJECTION, SOLUTION INTRAMUSCULAR; INTRAVENOUS at 13:23

## 2018-10-17 RX ADMIN — KETOROLAC TROMETHAMINE 30 MG: 30 INJECTION, SOLUTION INTRAMUSCULAR at 14:52

## 2018-10-17 RX ADMIN — FENTANYL CITRATE: 50 INJECTION, SOLUTION INTRAMUSCULAR; INTRAVENOUS at 15:12

## 2018-10-17 RX ADMIN — HYDROMORPHONE HYDROCHLORIDE 0.5 MG: 1 INJECTION, SOLUTION INTRAMUSCULAR; INTRAVENOUS; SUBCUTANEOUS at 16:09

## 2018-10-17 RX ADMIN — LIDOCAINE HYDROCHLORIDE 50 MG: 10 INJECTION, SOLUTION INFILTRATION; PERINEURAL at 13:23

## 2018-10-17 RX ADMIN — Medication 3 MG: at 14:01

## 2018-10-17 RX ADMIN — SODIUM CHLORIDE, POTASSIUM CHLORIDE, SODIUM LACTATE AND CALCIUM CHLORIDE 100 ML/HR: 600; 310; 30; 20 INJECTION, SOLUTION INTRAVENOUS at 11:22

## 2018-10-17 RX ADMIN — HYDROMORPHONE HYDROCHLORIDE 0.5 MG: 1 INJECTION, SOLUTION INTRAMUSCULAR; INTRAVENOUS; SUBCUTANEOUS at 14:40

## 2018-10-17 RX ADMIN — ONDANSETRON 4 MG: 2 INJECTION INTRAMUSCULAR; INTRAVENOUS at 13:30

## 2018-10-17 RX ADMIN — ATRACURIUM BESYLATE 30 MG: 10 INJECTION, SOLUTION INTRAVENOUS at 13:23

## 2018-10-17 RX ADMIN — PROPOFOL 150 MG: 10 INJECTION, EMULSION INTRAVENOUS at 13:23

## 2018-10-17 RX ADMIN — FENTANYL CITRATE 50 MCG: 50 INJECTION, SOLUTION INTRAMUSCULAR; INTRAVENOUS at 14:08

## 2018-10-17 RX ADMIN — HYDROMORPHONE HYDROCHLORIDE 0.5 MG: 1 INJECTION, SOLUTION INTRAMUSCULAR; INTRAVENOUS; SUBCUTANEOUS at 14:50

## 2018-10-17 RX ADMIN — FAMOTIDINE 20 MG: 20 TABLET ORAL at 11:22

## 2018-10-17 NOTE — ANESTHESIA POSTPROCEDURE EVALUATION
Patient: Elisa Hugo    Procedure Summary     Date:  10/17/18 [facility] / [facility]:   [facility]    Anesthesia Start:  1317 Anesthesia Stop:  1422    Procedure:  LUMBAR DISCECTOMY L5-S1 right, ,minimally invasive (Right Spine Lumbar) Diagnosis:       Sciatica of right side      Herniation of intervertebral disc of lumbar spine      (Sciatica of right side [M54.31])      (Herniation of intervertebral disc of lumbar spine [M51.26])    Surgeon:  César Peraza MD Provider:  Bro Vasques MD    Anesthesia Type:  general ASA Status:  2          Anesthesia Type: general  Last vitals  BP   117/64 (10/17/18 1421)   Temp   97.1 °F (36.2 °C) (10/17/18 1421)   Pulse   99 (10/17/18 1421)   Resp   16 (10/17/18 1421)     SpO2   100 % (10/17/18 1421)     Post Anesthesia Care and Evaluation    Patient location during evaluation: PACU  Patient participation: complete - patient participated  Level of consciousness: awake and alert  Pain score: 0  Pain management: adequate  Airway patency: patent  Anesthetic complications: No anesthetic complications  PONV Status: none  Cardiovascular status: hemodynamically stable and acceptable  Respiratory status: nonlabored ventilation, acceptable and nasal cannula  Hydration status: acceptable

## 2018-10-17 NOTE — ANESTHESIA PREPROCEDURE EVALUATION
Anesthesia Evaluation     Patient summary reviewed and Nursing notes reviewed   NPO Solid Status: > 8 hours  NPO Liquid Status: > 8 hours           Airway   Mallampati: I  TM distance: >3 FB  Neck ROM: full  No difficulty expected  Dental - normal exam     Pulmonary - normal exam   Cardiovascular   Exercise tolerance: good (4-7 METS)    Rhythm: regular  Rate: normal        Neuro/Psych  GI/Hepatic/Renal/Endo      Musculoskeletal     Abdominal    Substance History      OB/GYN          Other                        Anesthesia Plan    ASA 2     general     intravenous induction

## 2018-10-17 NOTE — BRIEF OP NOTE
LUMBAR DISCECTOMY MICRO ENDOSCOPIC  Progress Note    Elisa Hugo  10/17/2018    Pre-op Diagnosis:   Sciatica of right side [M54.31]  Herniation of intervertebral disc of lumbar spine [M51.26]       Post-Op Diagnosis Codes:     * Sciatica of right side [M54.31]     * Herniation of intervertebral disc of lumbar spine [M51.26]    Procedure/CPT® Codes:      Procedure(s):  LUMBAR DISCECTOMY L5-S1 right, ,minimally invasive    Surgeon(s):  César Peraza MD    Anesthesia: General    Staff:   Circulator: Zoraida Luis RN  Scrub Person: Govind Gordon  Nursing Assistant: Angelo Preciado  Assistant: Irina Damon PA-C; Darlene Linares PA-C  Orientee: Barbara Feng RN    Estimated Blood Loss: none    Urine Voided: * No values recorded between 10/17/2018  1:17 PM and 10/17/2018  2:02 PM *    Specimens:                None      Drains:      Findings: Herniated disc L5-S1 right    Complications: None      César Peraza MD     Date: 10/17/2018  Time: 2:03 PM

## 2018-10-17 NOTE — H&P
"Pre-Op H&P  Elisa Hugo  [id number]  1985    Chief complaint: Back pain    HPI:    Patient is a 32 y.o.female who presents with chronic right back, hip, and leg pain syndrome for about 2 years. It worsened about 3 months ago. MRI scan last year showed a herniated disc at L5-S1 on the right.  Lumbar myelogram shows a herniated disc at L5-S1 on the right that elevates the right S1 nerve root.  This is best seen on the CT imaging.  Conservative management has failed and the patient has elected to proceed with surgery to include a lumbar discectomy right L5-S1.    Review of Systems:  General ROS: negative for chills, fever or skin lesions;  No changes since last office visit  Cardiovascular ROS: no chest pain or dyspnea on exertion  Respiratory ROS: no cough, shortness of breath, or wheezing; +asthma    Allergies:   Allergies   Allergen Reactions   • Asa [Aspirin] GI Intolerance and Unknown (See Comments)     Gi upset    • Bean Pod Extract GI Intolerance     MILK AND SOY   • Diphth-Acell Pertussis-Tetanus Unknown (See Comments)     \" legs swelling\" happened when was an infant    • Penicillins GI Intolerance   • Soybean-Containing Drug Products GI Intolerance     All soy   • Strawberry Flavor GI Intolerance       Home Meds:    No current facility-administered medications on file prior to encounter.      Current Outpatient Prescriptions on File Prior to Encounter   Medication Sig Dispense Refill   • cetirizine (zyrTEC) 10 MG tablet Take 10 mg by mouth Every Night.     • diphenhydrAMINE (BENADRYL) 25 mg capsule Take 25 mg by mouth Every 6 (Six) Hours As Needed for Itching.     • QVAR 40 MCG/ACT inhaler Inhale 2 puffs As Needed.     • raNITIdine (ZANTAC) 150 MG tablet Take 150 mg by mouth At Night As Needed for Heartburn or Indigestion.     • VENTOLIN  (90 Base) MCG/ACT inhaler Inhale 2 puffs Every 4 (Four) Hours As Needed for Wheezing.         PMH:   Past Medical History:   Diagnosis Date   • Asthma    • " GERD (gastroesophageal reflux disease)    • Headache    • History of seizure as     • History of staph infection 2008    right leg after delivery of daughter; po meds only     • Hx of migraines    • Lower back pain    • Lumbar herniated disc    • Stomach ulcer      PSH:    Past Surgical History:   Procedure Laterality Date   • ANTERIOR CERVICAL DISCECTOMY W/ FUSION Bilateral 2018    Procedure: CERVICAL DISCECTOMY ANTERIOR WITH FUSION C6-7  BILATERAL;  Surgeon: César Peraza MD;  Location:  [facility];  Service: Neurosurgery   • INTERVENTIONAL RADIOLOGY PROCEDURE N/A 2018    Procedure: myelogram cervical spine ;  Surgeon: Abdi Santana MD;  Location:  [facility];  Service:    • INTERVENTIONAL RADIOLOGY PROCEDURE N/A 2018    Procedure: myelogram lumbar spine;  Surgeon: Abdi Santana MD;  Location:  [facility];  Service: Interventional Radiology   • SINUS SURGERY         Social History:   Tobacco:   History   Smoking Status   • Current Every Day Smoker   • Packs/day: 0.50   • Years: 10.00   Smokeless Tobacco   • Never Used      Alcohol:     History   Alcohol Use No       Vitals:  HR 75, /17, RR 16, O2 97%, afebrile              Physical Exam:  General Appearance:    Alert, cooperative, no distress, appears stated age   Head:    Normocephalic, without obvious abnormality, atraumatic   Lungs:     Clear to auscultation bilaterally, respirations unlabored    Heart:   Regular rate and rhythm, S1 and S2 normal, no murmur, rub    or gallop    Abdomen:    Soft, non-tender, +bowel sounds   Breast Exam:    deferred   Genitalia:    deferred   Extremities:   Extremities normal, atraumatic, no cyanosis or edema   Skin:   Skin color, texture, turgor normal, no rashes or lesions   Neurologic:   Grossly intact   Results Review  I reviewed the patient's new clinical results.    Cancer Staging (if applicable)  Cancer Patient: __ yes _X_no __unknown; If yes, clinical stage  T:__ N:__M:__, stage group or __N/A    Impression: Chronic herniated disc L5-S1, right    Plan: Lumbar discectomy L5-S1, right      Myra Soto, APRN   10/17/2018   11:20 AM

## 2018-10-17 NOTE — ANESTHESIA PROCEDURE NOTES
Airway  Urgency: elective    Airway not difficult    General Information and Staff    Patient location during procedure: OR  CRNA: JOSSE HARRIS    Indications and Patient Condition  Indications for airway management: airway protection    Preoxygenated: yes  MILS not maintained throughout  Mask difficulty assessment: 1 - vent by mask    Final Airway Details  Final airway type: endotracheal airway      Successful airway: ETT  Cuffed: yes   Successful intubation technique: direct laryngoscopy  Endotracheal tube insertion site: oral  Blade: Clau  Blade size: 3  ETT size: 7.0 mm  Cormack-Lehane Classification: grade I - full view of glottis  Placement verified by: chest auscultation and capnometry   Measured from: lips  ETT to lips (cm): 20  Number of attempts at approach: 1    Additional Comments  Negative epigastric sounds, Breath sound equal bilaterally with symmetric chest rise and fall

## 2018-10-25 ENCOUNTER — TELEPHONE (OUTPATIENT)
Dept: NEUROSURGERY | Facility: CLINIC | Age: 33
End: 2018-10-25

## 2018-10-25 NOTE — TELEPHONE ENCOUNTER
Provider:  Stu  Caller: patient  Time of call:  10:52   Phone #:  [phone]  Surgery:  Lumbar discectomy L5-S1  Surgery Date:  10/17/18  Last visit:   same  Next visit: 11/08/18    SHIRA:         Reason for call:   Patient had some post-operative questions.  She has only had 2 BM's since her surgery.  She is severely constipated. She is going to increase her fluids as she is already taking a stool softener and using fiber. She will give us a call back if she doesn't have any relief.

## 2018-10-29 DIAGNOSIS — Z98.890 S/P LUMBAR DISCECTOMY: Primary | ICD-10-CM

## 2018-10-29 RX ORDER — HYDROCODONE BITARTRATE AND ACETAMINOPHEN 7.5; 325 MG/1; MG/1
1 TABLET ORAL EVERY 4 HOURS PRN
Qty: 40 TABLET | Refills: 0 | Status: SHIPPED | OUTPATIENT
Start: 2018-10-29 | End: 2018-11-16 | Stop reason: DRUGHIGH

## 2018-10-29 NOTE — TELEPHONE ENCOUNTER
Provider:  Stu  Caller: Patient  Time of call:   12:12  Phone #:  [phone]  Surgery:  Lumbar discectomy L5-S1  Surgery Date:  10/17/18  Last visit:   same  Next visit: 11/08/18    SHIRA:   05/17/2018 Hydrocodone/Acetaminophen  325MG/5MG  1985 45 [address] BARRETT [facility]  [facility] 19 1  06/06/2018 Hydrocodone/Acetaminophen  325MG/5MG  1985 45 [address] MOLLY ROMANO [facility]  [facility] 10 1  10/17/2018 Hydrocodone/Acetaminophen  325MG/7.5MG  1985 40 6 RUTH BARRETT [facility]  [facility] 50 1      Reason for call:     Patient requests refill on [place].

## 2018-11-08 ENCOUNTER — OFFICE VISIT (OUTPATIENT)
Dept: NEUROSURGERY | Facility: CLINIC | Age: 33
End: 2018-11-08

## 2018-11-08 VITALS — WEIGHT: 140 LBS | TEMPERATURE: 98 F | HEIGHT: 64 IN | BODY MASS INDEX: 23.9 KG/M2

## 2018-11-08 DIAGNOSIS — Z98.890 S/P LUMBAR DISCECTOMY: Primary | ICD-10-CM

## 2018-11-08 PROBLEM — M51.26 HERNIATION OF INTERVERTEBRAL DISC OF LUMBAR SPINE: Status: RESOLVED | Noted: 2018-08-28 | Resolved: 2018-11-08

## 2018-11-08 PROCEDURE — 99024 POSTOP FOLLOW-UP VISIT: CPT | Performed by: NEUROLOGICAL SURGERY

## 2018-11-08 NOTE — PATIENT INSTRUCTIONS
Keep walking- try to add 5 minutes every other day.     If you have any questions in regards to your visit with  today, please do not hesitate to contact our office. Ask to speak with a CMA for any clinical questions you may have.    Antoinette Ibarra [facility]    [phone]

## 2018-11-08 NOTE — PROGRESS NOTES
Subjective   Elisa Hugo is a 32 y.o. female who presents for follow up of  L5-S1 right discectomy.     The patient had a right L5-S1 lumbar discectomy 3 weeks ago on 10/17/18.  She has had an unremarkable and benign postoperative course.  Her leg pain is better.  Her incision is healing well.  She feels generally exhausted.  She has pain when she sits, which is not unusual.  She had an ACDF at C6-7 in April.    The following portions of the patient's history were reviewed, updated as appropriate and approved: allergies, current medications, past family history, past medical history, past social history, past surgical history, review of systems and problem list.     Review of Systems   Constitutional: Negative for activity change, appetite change, chills, diaphoresis, fatigue, fever and unexpected weight change.   HENT: Negative for congestion, dental problem, drooling, ear discharge, ear pain, facial swelling, hearing loss, mouth sores, nosebleeds, postnasal drip, rhinorrhea, sinus pressure, sneezing, sore throat, tinnitus, trouble swallowing and voice change.    Eyes: Negative for photophobia, pain, discharge, redness, itching and visual disturbance.   Respiratory: Negative for apnea, cough, choking, chest tightness, shortness of breath, wheezing and stridor.    Cardiovascular: Negative for chest pain, palpitations and leg swelling.   Gastrointestinal: Negative for abdominal distention, abdominal pain, anal bleeding, blood in stool, constipation, diarrhea, nausea, rectal pain and vomiting.   Endocrine: Negative for cold intolerance, heat intolerance, polydipsia, polyphagia and polyuria.   Genitourinary: Negative for decreased urine volume, difficulty urinating, dysuria, enuresis, flank pain, frequency, genital sores, hematuria and urgency.   Musculoskeletal: Positive for back pain. Negative for arthralgias, gait problem, joint swelling, myalgias, neck pain and neck stiffness.   Skin: Negative for color change,  pallor, rash and wound.   Allergic/Immunologic: Negative for environmental allergies, food allergies and immunocompromised state.   Neurological: Positive for weakness and numbness. Negative for dizziness, tremors, seizures, syncope, facial asymmetry, speech difficulty, light-headedness and headaches.   Hematological: Negative for adenopathy. Does not bruise/bleed easily.   Psychiatric/Behavioral: Negative for agitation, behavioral problems, confusion, decreased concentration, dysphoric mood, hallucinations, self-injury, sleep disturbance and suicidal ideas. The patient is not nervous/anxious and is not hyperactive.    All other systems reviewed and are negative.      Objective    NEUROLOGICAL EXAMINATION:    Lumbar incision well-healed, no problems.    Assessment   3 weeks postop lumbar discectomy L5-S1 right.  Doing reasonably well, no complications.       Plan   Increase walking distance gradually.  Follow-up in 6 weeks.       César Peraza MD

## 2018-11-16 DIAGNOSIS — Z98.890 S/P LUMBAR DISCECTOMY: Primary | ICD-10-CM

## 2018-11-16 RX ORDER — HYDROCODONE BITARTRATE AND ACETAMINOPHEN 5; 325 MG/1; MG/1
1 TABLET ORAL EVERY 8 HOURS PRN
Qty: 30 TABLET | Refills: 0 | Status: SHIPPED | OUTPATIENT
Start: 2018-11-16 | End: 2018-11-29 | Stop reason: SDUPTHER

## 2018-11-16 RX ORDER — HYDROCODONE BITARTRATE AND ACETAMINOPHEN 7.5; 325 MG/1; MG/1
1 TABLET ORAL EVERY 4 HOURS PRN
Qty: 40 TABLET | Refills: 0 | Status: CANCELLED | OUTPATIENT
Start: 2018-11-16

## 2018-11-16 NOTE — TELEPHONE ENCOUNTER
Provider:  Stu  Caller: patient  Time of call:  10:03   Phone #:  [phone]  Surgery:  Lumbar discectomy L5-S1 Right  Surgery Date:  10/17/18  Last visit:   11/08/18  Next visit: 12/20/18    SHIRA: 06/06/2018 Hydrocodone/Acetaminophen  325MG/5MG  1985 45 [address] MOLLY ROMANO [place] [facility]  [facility] 10 1  10/17/2018 Hydrocodone/Acetaminophen  325MG/7.5MG  1985 40 6 RUTH BARRETT [place] [facility] [employer].  [facility] 50 1  10/31/2018 Hydrocodone/Acetaminophen  325MG/7.5MG  1985 40 7 MOLLY ROMANO [place] [facility] [facility]  [facility] 43 1        Reason for call:     Patient requests refill on Norco 7/5 mg.  She will be in town today and would like to pick it up.

## 2018-11-16 NOTE — TELEPHONE ENCOUNTER
Decrease her Norco to 5/325 three times a day PRN, disp #30.   Please let her know about this change and that we will try to wean her from this medication.

## 2018-11-26 ENCOUNTER — TELEPHONE (OUTPATIENT)
Dept: NEUROSURGERY | Facility: CLINIC | Age: 33
End: 2018-11-26

## 2018-11-26 RX ORDER — METHYLPREDNISOLONE 4 MG/1
TABLET ORAL
Qty: 21 TABLET | Refills: 0 | Status: ON HOLD | OUTPATIENT
Start: 2018-11-26 | End: 2018-12-18

## 2018-11-26 NOTE — TELEPHONE ENCOUNTER
"Elisa left message saying her pain is progressively getting worse since her surgery with Dr. Peraza. She stated in her voicemail, \"I am having intense pain radiating down my right leg. The pain feels much worse now than it did before I had my surgery.\" She would like someone to give her a call regarding this issue.     She had a lumbar discectomy L5-S1 right on 10/17/18 with Dr. Peraza. Was last seen in the office with Dr. Peraza on 11/08/18.     "

## 2018-11-26 NOTE — TELEPHONE ENCOUNTER
"She is now 5 1/2 weeks out from surgery. She had a 3 week follow up on 11/8 and Dr. Peraza's note indicated that she was doing fairly well at that point.     Spoke with the patient: she was doing well at her follow up and was improving.     Her dad was sick and she was helping to take care of him, bringing him things, picking up etc. She did not pick him up or     For the last week or so, she is getting shooting pain down the right leg in the same place as before. She is getting \"horrible pain\" in her right calf, like a bharati horse.  The last steroid pack that she had was 10 months.      We will call in a new steroid pack today and have her come in on Thursday to see one of the PAs for an exam to see if she needs an MRI  "

## 2018-11-29 ENCOUNTER — OFFICE VISIT (OUTPATIENT)
Dept: NEUROSURGERY | Facility: CLINIC | Age: 33
End: 2018-11-29

## 2018-11-29 VITALS
TEMPERATURE: 97.7 F | SYSTOLIC BLOOD PRESSURE: 108 MMHG | WEIGHT: 143 LBS | DIASTOLIC BLOOD PRESSURE: 78 MMHG | HEIGHT: 64 IN | BODY MASS INDEX: 24.41 KG/M2

## 2018-11-29 DIAGNOSIS — M54.31 SCIATICA OF RIGHT SIDE: Primary | ICD-10-CM

## 2018-11-29 DIAGNOSIS — Z98.890 S/P LUMBAR DISCECTOMY: ICD-10-CM

## 2018-11-29 PROCEDURE — 99024 POSTOP FOLLOW-UP VISIT: CPT | Performed by: PHYSICIAN ASSISTANT

## 2018-11-29 RX ORDER — CYCLOBENZAPRINE HCL 10 MG
10 TABLET ORAL 2 TIMES DAILY PRN
Qty: 30 TABLET | Refills: 1 | Status: ON HOLD | OUTPATIENT
Start: 2018-11-29 | End: 2018-12-18

## 2018-11-29 RX ORDER — HYDROCODONE BITARTRATE AND ACETAMINOPHEN 5; 325 MG/1; MG/1
1 TABLET ORAL EVERY 8 HOURS PRN
Qty: 30 TABLET | Refills: 0 | Status: SHIPPED | OUTPATIENT
Start: 2018-11-29 | End: 2018-12-13 | Stop reason: SDUPTHER

## 2018-11-29 NOTE — PROGRESS NOTES
"Elisa Hugo  1985  2018  [id number]    CC: Back and right leg pain    HPI:  Ms. Hugo is a 32-year-old female who presents today for a postoperative visit.  She is status post lumbar discectomy L5-S1 on 10/17/18.  This is her second postoperative visit.  Per her last visit, she was experiencing back pain when in a seated position, but otherwise had improvement of her leg pain.  Today she states that she is having right leg pain again.  She states that the pain begins in her back and radiates down her entire right leg, worse with walking, standing, or sitting.  She admits to pain in her calf and burning sensation in her thigh.  She called our office earlier this week and was given a steroid pack.  She states that she has tried this in combination with baclofen and has gotten no relief.  She states that her back and right leg pain is similar and more aggressive than it was preoperatively.    Past Medical History:   Diagnosis Date   • Asthma    • GERD (gastroesophageal reflux disease)    • Headache    • History of seizure as     • History of staph infection 2008    right leg after delivery of daughter; po meds only     • Hx of migraines    • Lower back pain    • Lumbar herniated disc    • Stomach ulcer        Allergies   Allergen Reactions   • Asa [Aspirin] GI Intolerance and Unknown (See Comments)     Gi upset    • Bean Pod Extract GI Intolerance     MILK AND SOY   • Diphth-Acell Pertussis-Tetanus Unknown (See Comments)     \" legs swelling\" happened when was an infant    • Penicillins GI Intolerance   • Soybean-Containing Drug Products GI Intolerance     All soy   • Strawberry Flavor GI Intolerance         Current Outpatient Medications:   •  cetirizine (zyrTEC) 10 MG tablet, Take 10 mg by mouth Every Night., Disp: , Rfl:   •  cyclobenzaprine (FLEXERIL) 10 MG tablet, Take 1 tablet by mouth 2 (Two) Times a Day As Needed for Muscle Spasms., Disp: 30 tablet, Rfl: 1  •  diphenhydrAMINE (BENADRYL) 25 " "mg capsule, Take 25 mg by mouth Every 6 (Six) Hours As Needed for Itching., Disp: , Rfl:   •  HYDROcodone-acetaminophen (NORCO) 5-325 MG per tablet, Take 1 tablet by mouth Every 8 (Eight) Hours As Needed for Moderate Pain  or Severe Pain ., Disp: 30 tablet, Rfl: 0  •  MethylPREDNISolone (MEDROL, EMANUEL,) 4 MG tablet, Take as directed on package instructions., Disp: 21 tablet, Rfl: 0  •  QVAR 40 MCG/ACT inhaler, Inhale 2 puffs As Needed., Disp: , Rfl:   •  raNITIdine (ZANTAC) 150 MG tablet, Take 150 mg by mouth At Night As Needed for Heartburn or Indigestion., Disp: , Rfl:   •  VENTOLIN  (90 Base) MCG/ACT inhaler, Inhale 2 puffs Every 4 (Four) Hours As Needed for Wheezing., Disp: , Rfl:     Social History     Socioeconomic History   • Marital status: Single     Spouse name: Not on file   • Number of children: Not on file   • Years of education: Not on file   • Highest education level: Not on file   Tobacco Use   • Smoking status: Current Every Day Smoker     Packs/day: 0.50     Years: 10.00     Pack years: 5.00   • Smokeless tobacco: Never Used   Substance and Sexual Activity   • Alcohol use: No   • Drug use: No   • Sexual activity: Defer     Birth control/protection: Abstinence       Family History   Problem Relation Age of Onset   • Arthritis Mother        Review of Systems   Musculoskeletal: Positive for back pain.   Neurological: Positive for weakness and numbness.   All other systems reviewed and are negative.      PE:  Physical Exam   Skin:   Lumbar incision well approximated.  No erythema, drainage or fluctuance.     /78   Temp 97.7 °F (36.5 °C) (Temporal)   Ht 162.6 cm (64.02\")   Wt 64.9 kg (143 lb)   BMI 24.53 kg/m²     PE:    Neurologic Exam     Motor Exam     Strength   Right strength: 5/5  Left strength: Strength 4/ 5 with the right lower extremity plantar flexion and dorsiflexion.SLR positive on the right     Sensory Exam   Light touch normal.     Gait, Coordination, and Reflexes "     Gait  Gait: (abnormal activation of right leg secondary to pain)    Reflexes   Reflexes 2+ except as noted.       MDM   Ms. Hugo was seen today for a post operative visit.  Due to her increased radicular symptoms and failed steroid treatment, I have referred her to physical therapy.  I have also called in Flexeril for her to try at bedtime for relief of her symptoms. She has an appointment scheduled with Dr. Peraza on 12/20/18.  She will call us back approximately 1 week before her appointment to let us know how physical therapy is going.  At this time, we will determine if additional imaging is necessary.  I have left her prescription for [place] 5/325 in the [place] office so that a physician can sign it either tomorrow or Monday.     Irina Damon PA-C

## 2018-11-30 ENCOUNTER — DOCUMENTATION (OUTPATIENT)
Dept: NEUROSURGERY | Facility: CLINIC | Age: 33
End: 2018-11-30

## 2018-11-30 NOTE — PROGRESS NOTES
Rx for Norco 5/325mg left from OV 11/29/18 was signed by Dr. De Dios.  Pt will  in office today.  Rx left up front.     NK

## 2018-12-04 ENCOUNTER — TELEPHONE (OUTPATIENT)
Dept: NEUROSURGERY | Facility: CLINIC | Age: 33
End: 2018-12-04

## 2018-12-04 DIAGNOSIS — M54.31 SCIATICA OF RIGHT SIDE: Primary | ICD-10-CM

## 2018-12-04 NOTE — TELEPHONE ENCOUNTER
Provider:  Stu  Caller: patient  Time of call:  10:42   Phone #:  [phone]  Surgery:  Lumbar discectomy L5-S1 right  Surgery Date: 10/17/18   Last visit:   11/29/18  Next visit: 12/20/18  SHIRA:         Reason for call:     Patient called and said since her last visit her back and leg pain has increased.  Patient states that she can't walk or stand for more than 5 minuets without crying because her pain in so severe.

## 2018-12-04 NOTE — TELEPHONE ENCOUNTER
Spoke with patient to let her know we will get her in to be seen on Thursday with either me or Dr. Peraza with flexion/extention xrays of lumbar spine.

## 2018-12-04 NOTE — TELEPHONE ENCOUNTER
See if her appt with Dr. Peraza can be moved up to this week or next. I will discuss with him to see if he would like new imaging.

## 2018-12-06 ENCOUNTER — OFFICE VISIT (OUTPATIENT)
Dept: NEUROSURGERY | Facility: CLINIC | Age: 33
End: 2018-12-06

## 2018-12-06 ENCOUNTER — HOSPITAL ENCOUNTER (OUTPATIENT)
Dept: GENERAL RADIOLOGY | Facility: HOSPITAL | Age: 33
Discharge: HOME | End: 2018-12-06
Attending: PHYSICIAN ASSISTANT | Admitting: PHYSICIAN ASSISTANT

## 2018-12-06 VITALS
WEIGHT: 145 LBS | BODY MASS INDEX: 24.75 KG/M2 | DIASTOLIC BLOOD PRESSURE: 60 MMHG | SYSTOLIC BLOOD PRESSURE: 118 MMHG | HEIGHT: 64 IN | TEMPERATURE: 97.6 F

## 2018-12-06 DIAGNOSIS — M54.31 SCIATICA OF RIGHT SIDE: Primary | ICD-10-CM

## 2018-12-06 DIAGNOSIS — Z98.890 S/P LUMBAR DISCECTOMY: ICD-10-CM

## 2018-12-06 PROCEDURE — 99024 POSTOP FOLLOW-UP VISIT: CPT | Performed by: PHYSICIAN ASSISTANT

## 2018-12-06 PROCEDURE — 72120 X-RAY BEND ONLY L-S SPINE: CPT

## 2018-12-06 RX ORDER — METHOCARBAMOL 750 MG/1
750 TABLET, FILM COATED ORAL 3 TIMES DAILY
Qty: 60 TABLET | Refills: 1 | Status: SHIPPED | OUTPATIENT
Start: 2018-12-06 | End: 2019-01-22 | Stop reason: SDUPTHER

## 2018-12-06 NOTE — H&P (VIEW-ONLY)
"Elisa Hugo  1985  2018  [id number]    CC: Back and right leg pain    HPI: Ms. Hugo is seen today for a follow-up visit. She is s/p  lumbar discectomy L5-S1 on 10/17/2018.  She began having lower back and right leg pain approximately 6 weeks post-op. She states that the pain is the same as last week during her visit, located mostly in her lower back and radiates to her lateral right foot. The pain is associated with right foot tingling and pain in her calf. She denies any numbness or new bowel or bladder dysfunction. She has attempted walking exercises, muscle relaxers, and steroids for pain with no relief.         Past Medical History:   Diagnosis Date   • Asthma    • GERD (gastroesophageal reflux disease)    • Headache    • History of seizure as     • History of staph infection 2008    right leg after delivery of daughter; po meds only     • Hx of migraines    • Lower back pain    • Lumbar herniated disc    • Stomach ulcer        Allergies   Allergen Reactions   • Asa [Aspirin] GI Intolerance and Unknown (See Comments)     Gi upset    • Bean Pod Extract GI Intolerance     MILK AND SOY   • Diphth-Acell Pertussis-Tetanus Unknown (See Comments)     \" legs swelling\" happened when was an infant    • Penicillins GI Intolerance   • Soybean-Containing Drug Products GI Intolerance     All soy   • Strawberry Flavor GI Intolerance         Current Outpatient Medications:   •  cetirizine (zyrTEC) 10 MG tablet, Take 10 mg by mouth Every Night., Disp: , Rfl:   •  cyclobenzaprine (FLEXERIL) 10 MG tablet, Take 1 tablet by mouth 2 (Two) Times a Day As Needed for Muscle Spasms., Disp: 30 tablet, Rfl: 1  •  diphenhydrAMINE (BENADRYL) 25 mg capsule, Take 25 mg by mouth Every 6 (Six) Hours As Needed for Itching., Disp: , Rfl:   •  HYDROcodone-acetaminophen (NORCO) 5-325 MG per tablet, Take 1 tablet by mouth Every 8 (Eight) Hours As Needed for Moderate Pain  or Severe Pain ., Disp: 30 tablet, Rfl: 0  •  " "methocarbamol (ROBAXIN) 750 MG tablet, Take 1 tablet by mouth 3 (Three) Times a Day., Disp: 60 tablet, Rfl: 1  •  MethylPREDNISolone (MEDROL, EMANUEL,) 4 MG tablet, Take as directed on package instructions., Disp: 21 tablet, Rfl: 0  •  QVAR 40 MCG/ACT inhaler, Inhale 2 puffs As Needed., Disp: , Rfl:   •  raNITIdine (ZANTAC) 150 MG tablet, Take 150 mg by mouth At Night As Needed for Heartburn or Indigestion., Disp: , Rfl:   •  VENTOLIN  (90 Base) MCG/ACT inhaler, Inhale 2 puffs Every 4 (Four) Hours As Needed for Wheezing., Disp: , Rfl:     Social History     Socioeconomic History   • Marital status: Single     Spouse name: Not on file   • Number of children: Not on file   • Years of education: Not on file   • Highest education level: Not on file   Tobacco Use   • Smoking status: Current Every Day Smoker     Packs/day: 0.50     Years: 10.00     Pack years: 5.00   • Smokeless tobacco: Never Used   Substance and Sexual Activity   • Alcohol use: No   • Drug use: No   • Sexual activity: Defer     Birth control/protection: Abstinence       Family History   Problem Relation Age of Onset   • Arthritis Mother        Review of Systems   Musculoskeletal: Positive for back pain.   Neurological: Positive for weakness and numbness.   All other systems reviewed and are negative.      PE:  Physical Exam  /60   Temp 97.6 °F (36.4 °C) (Temporal)   Ht 162.6 cm (64.02\")   Wt 65.8 kg (145 lb)   BMI 24.87 kg/m²     PE:    Neurologic Exam  Motor Exam   ROM testing limited in bilateral lower extremities due to pain.    Sensory Exam   Sensation is equal in bilateral lower extremities. No numbness or sensory alteration in RLE compared to left.     Gait, Coordination, and Reflexes   Gait: (abnormal activation of right leg secondary to pain)   Reflexes 2+ in bilateral lower extremities.       MDM  Ms. Hugo was seen today for a f/u post op visit. I reviewed her flexion/extension xrays of her lumbar spine which were unremarkable. I " reviewed her symptoms with Dr. Peraza who states a lumbar myelogram is the next step. She has requested a different muscle relaxer to try to relieve this pain. I have called in Robaxin for her to try. I encouraged her to attend physical therapy with light exercises. If lumbar myelogram is completed before her appointment with Dr. Peraza, she will see me back in the office to discuss the results of this.       Irina Damon PA-C

## 2018-12-06 NOTE — PROGRESS NOTES
"Elisa Hugo  1985  2018  [id number]    CC: Back and right leg pain    HPI: Ms. Hugo is seen today for a follow-up visit. She is s/p  lumbar discectomy L5-S1 on 10/17/2018.  She began having lower back and right leg pain approximately 6 weeks post-op. She states that the pain is the same as last week during her visit, located mostly in her lower back and radiates to her lateral right foot. The pain is associated with right foot tingling and pain in her calf. She denies any numbness or new bowel or bladder dysfunction. She has attempted walking exercises, muscle relaxers, and steroids for pain with no relief.         Past Medical History:   Diagnosis Date   • Asthma    • GERD (gastroesophageal reflux disease)    • Headache    • History of seizure as     • History of staph infection 2008    right leg after delivery of daughter; po meds only     • Hx of migraines    • Lower back pain    • Lumbar herniated disc    • Stomach ulcer        Allergies   Allergen Reactions   • Asa [Aspirin] GI Intolerance and Unknown (See Comments)     Gi upset    • Bean Pod Extract GI Intolerance     MILK AND SOY   • Diphth-Acell Pertussis-Tetanus Unknown (See Comments)     \" legs swelling\" happened when was an infant    • Penicillins GI Intolerance   • Soybean-Containing Drug Products GI Intolerance     All soy   • Strawberry Flavor GI Intolerance         Current Outpatient Medications:   •  cetirizine (zyrTEC) 10 MG tablet, Take 10 mg by mouth Every Night., Disp: , Rfl:   •  cyclobenzaprine (FLEXERIL) 10 MG tablet, Take 1 tablet by mouth 2 (Two) Times a Day As Needed for Muscle Spasms., Disp: 30 tablet, Rfl: 1  •  diphenhydrAMINE (BENADRYL) 25 mg capsule, Take 25 mg by mouth Every 6 (Six) Hours As Needed for Itching., Disp: , Rfl:   •  HYDROcodone-acetaminophen (NORCO) 5-325 MG per tablet, Take 1 tablet by mouth Every 8 (Eight) Hours As Needed for Moderate Pain  or Severe Pain ., Disp: 30 tablet, Rfl: 0  •  " "methocarbamol (ROBAXIN) 750 MG tablet, Take 1 tablet by mouth 3 (Three) Times a Day., Disp: 60 tablet, Rfl: 1  •  MethylPREDNISolone (MEDROL, EMANUEL,) 4 MG tablet, Take as directed on package instructions., Disp: 21 tablet, Rfl: 0  •  QVAR 40 MCG/ACT inhaler, Inhale 2 puffs As Needed., Disp: , Rfl:   •  raNITIdine (ZANTAC) 150 MG tablet, Take 150 mg by mouth At Night As Needed for Heartburn or Indigestion., Disp: , Rfl:   •  VENTOLIN  (90 Base) MCG/ACT inhaler, Inhale 2 puffs Every 4 (Four) Hours As Needed for Wheezing., Disp: , Rfl:     Social History     Socioeconomic History   • Marital status: Single     Spouse name: Not on file   • Number of children: Not on file   • Years of education: Not on file   • Highest education level: Not on file   Tobacco Use   • Smoking status: Current Every Day Smoker     Packs/day: 0.50     Years: 10.00     Pack years: 5.00   • Smokeless tobacco: Never Used   Substance and Sexual Activity   • Alcohol use: No   • Drug use: No   • Sexual activity: Defer     Birth control/protection: Abstinence       Family History   Problem Relation Age of Onset   • Arthritis Mother        Review of Systems   Musculoskeletal: Positive for back pain.   Neurological: Positive for weakness and numbness.   All other systems reviewed and are negative.      PE:  Physical Exam  /60   Temp 97.6 °F (36.4 °C) (Temporal)   Ht 162.6 cm (64.02\")   Wt 65.8 kg (145 lb)   BMI 24.87 kg/m²     PE:    Neurologic Exam  Motor Exam   ROM testing limited in bilateral lower extremities due to pain.    Sensory Exam   Sensation is equal in bilateral lower extremities. No numbness or sensory alteration in RLE compared to left.     Gait, Coordination, and Reflexes   Gait: (abnormal activation of right leg secondary to pain)   Reflexes 2+ in bilateral lower extremities.       MDM  Ms. Hugo was seen today for a f/u post op visit. I reviewed her flexion/extension xrays of her lumbar spine which were unremarkable. I " reviewed her symptoms with Dr. Peraza who states a lumbar myelogram is the next step. She has requested a different muscle relaxer to try to relieve this pain. I have called in Robaxin for her to try. I encouraged her to attend physical therapy with light exercises. If lumbar myelogram is completed before her appointment with Dr. Peraza, she will see me back in the office to discuss the results of this.       Irina Damon PA-C

## 2018-12-10 DIAGNOSIS — M54.31 SCIATICA OF RIGHT SIDE: Primary | ICD-10-CM

## 2018-12-13 DIAGNOSIS — Z98.890 S/P LUMBAR DISCECTOMY: ICD-10-CM

## 2018-12-13 RX ORDER — HYDROCODONE BITARTRATE AND ACETAMINOPHEN 5; 325 MG/1; MG/1
1 TABLET ORAL EVERY 8 HOURS PRN
Qty: 20 TABLET | Refills: 0 | Status: SHIPPED | OUTPATIENT
Start: 2018-12-13 | End: 2018-12-21 | Stop reason: SDUPTHER

## 2018-12-13 NOTE — TELEPHONE ENCOUNTER
Provider:  Stu  Caller: pt   Time of call:   12:16  Phone #:  [phone]  Surgery:  LUMBAR DISCECTOMY L5-S1 RIGHT, MINIMALLY INVASIVE  Surgery Date:  10/17/2018  Last visit:   12/6/2018  Next visit: RAZA GOULDPER:         11/23/2018 Hydrocodone/Acetaminophen  325MG/5MG  1985 9 3 JANIE MICHELLE [employer] [place] [employer] [employer]  [facility] 15 1    11/30/2018 Hydrocodone/Acetaminophen  325MG/5MG  1985 30 [birth year] RADHA BARAJAS  [employer] [employer] [employer]  [facility] 15 1    Reason for call:         Pt RF request for Norco.

## 2018-12-18 ENCOUNTER — APPOINTMENT (OUTPATIENT)
Dept: CT IMAGING | Facility: HOSPITAL | Age: 33
End: 2018-12-18

## 2018-12-18 ENCOUNTER — HOSPITAL ENCOUNTER (OUTPATIENT)
Facility: HOSPITAL | Age: 33
Discharge: HOME | End: 2018-12-18
Attending: RADIOLOGY | Admitting: PHYSICIAN ASSISTANT

## 2018-12-18 VITALS
HEIGHT: 64 IN | TEMPERATURE: 97.1 F | DIASTOLIC BLOOD PRESSURE: 78 MMHG | SYSTOLIC BLOOD PRESSURE: 105 MMHG | BODY MASS INDEX: 24.09 KG/M2 | WEIGHT: 141.09 LBS | RESPIRATION RATE: 16 BRPM | OXYGEN SATURATION: 97 % | HEART RATE: 75 BPM

## 2018-12-18 DIAGNOSIS — M54.31 SCIATICA OF RIGHT SIDE: ICD-10-CM

## 2018-12-18 LAB — B-HCG UR QL: NEGATIVE

## 2018-12-18 PROCEDURE — 81025 URINE PREGNANCY TEST: CPT | Performed by: RADIOLOGY

## 2018-12-18 PROCEDURE — 62304 MYELOGRAPHY LUMBAR INJECTION: CPT | Performed by: RADIOLOGY

## 2018-12-18 PROCEDURE — 0 IOPAMIDOL 41 % SOLUTION: Performed by: RADIOLOGY

## 2018-12-18 PROCEDURE — 72132 CT LUMBAR SPINE W/DYE: CPT

## 2018-12-18 RX ORDER — LIDOCAINE HYDROCHLORIDE 10 MG/ML
INJECTION, SOLUTION EPIDURAL; INFILTRATION; INTRACAUDAL; PERINEURAL AS NEEDED
Status: DISCONTINUED | OUTPATIENT
Start: 2018-12-18 | End: 2018-12-18 | Stop reason: HOSPADM

## 2018-12-21 DIAGNOSIS — Z98.890 S/P LUMBAR DISCECTOMY: ICD-10-CM

## 2018-12-21 RX ORDER — HYDROCODONE BITARTRATE AND ACETAMINOPHEN 5; 325 MG/1; MG/1
1 TABLET ORAL EVERY 8 HOURS PRN
Qty: 20 TABLET | Refills: 0 | Status: SHIPPED | OUTPATIENT
Start: 2018-12-21 | End: 2018-12-31 | Stop reason: SDUPTHER

## 2018-12-21 NOTE — TELEPHONE ENCOUNTER
Provider:  Stu  Caller: Elisa  Phone #:  [phone]  Surgery:  Lumbar Discectomy  Surgery Date:   10/17*/18  Last visit:   12/6/18  Next visit: 1/3/18    SHIRA:       11/16/2018 Hydrocodone/Acetaminophen  325MG/5MG  1985 21 7 JANIE MICHELLE [employer] [facility]  [facility] 15 1  11/23/2018 Hydrocodone/Acetaminophen  325MG/5MG  1985 9 3 JANIE MICHELLE [place] [place] [facility]  [facility] 15 1  11/30/2018 Hydrocodone/Acetaminophen  325MG/5MG  1985 30 10 RADHA BARAJAS [place] [facility]  [facility] 15 1  12/14/2018 Hydrocodone/Acetaminophen  325MG/5MG  1985 20 6 RADHA BARAJAS [place] [facility]  [facility] 17 1    Reason for call:       Norco Refill

## 2018-12-31 DIAGNOSIS — Z98.890 S/P LUMBAR DISCECTOMY: ICD-10-CM

## 2018-12-31 RX ORDER — HYDROCODONE BITARTRATE AND ACETAMINOPHEN 5; 325 MG/1; MG/1
1 TABLET ORAL EVERY 8 HOURS PRN
Qty: 15 TABLET | Refills: 0 | Status: SHIPPED | OUTPATIENT
Start: 2018-12-31 | End: 2019-01-09 | Stop reason: SDUPTHER

## 2019-01-03 ENCOUNTER — OFFICE VISIT (OUTPATIENT)
Dept: NEUROSURGERY | Facility: CLINIC | Age: 34
End: 2019-01-03

## 2019-01-03 VITALS — BODY MASS INDEX: 24.07 KG/M2 | WEIGHT: 141 LBS | TEMPERATURE: 97.9 F | HEIGHT: 64 IN

## 2019-01-03 DIAGNOSIS — G89.29 CHRONIC MIDLINE LOW BACK PAIN WITHOUT SCIATICA: ICD-10-CM

## 2019-01-03 DIAGNOSIS — Z98.890 S/P LUMBAR DISCECTOMY: Primary | ICD-10-CM

## 2019-01-03 DIAGNOSIS — M51.26 HERNIATION OF INTERVERTEBRAL DISC OF LUMBAR SPINE: ICD-10-CM

## 2019-01-03 DIAGNOSIS — M51.34 DDD (DEGENERATIVE DISC DISEASE), THORACIC: ICD-10-CM

## 2019-01-03 DIAGNOSIS — M54.31 SCIATICA OF RIGHT SIDE: ICD-10-CM

## 2019-01-03 DIAGNOSIS — M54.50 CHRONIC MIDLINE LOW BACK PAIN WITHOUT SCIATICA: ICD-10-CM

## 2019-01-03 PROCEDURE — 99024 POSTOP FOLLOW-UP VISIT: CPT | Performed by: NEUROLOGICAL SURGERY

## 2019-01-03 NOTE — PROGRESS NOTES
Subjective   Elisa Hugo is a 33 y.o. female who presents for follow up of  right hip and leg pain.     The patient had a lumbar discectomy at L5-S1 on the right on 10/17/18, 2-1/2 months ago.  After 6 weeks she had recurrent pain to her right hip and leg that has become intractable and he has failed to respond to exercise, steroids, muscle relaxers, and recurring use of her therapy postop instructions.    Lumbar myelogram shows recurrent disc herniation L5-S1 on the right.    The following portions of the patient's history were reviewed, updated as appropriate and approved: allergies, current medications, past family history, past medical history, past social history, past surgical history, review of systems and problem list.     Review of Systems   Constitutional: Negative for activity change, appetite change, chills, diaphoresis, fatigue, fever and unexpected weight change.   HENT: Negative for congestion, dental problem, drooling, ear discharge, ear pain, facial swelling, hearing loss, mouth sores, nosebleeds, postnasal drip, rhinorrhea, sinus pressure, sneezing, sore throat, tinnitus, trouble swallowing and voice change.    Eyes: Negative for photophobia, pain, discharge, redness, itching and visual disturbance.   Respiratory: Negative for apnea, cough, choking, chest tightness, shortness of breath, wheezing and stridor.    Cardiovascular: Negative for chest pain, palpitations and leg swelling.   Gastrointestinal: Negative for abdominal distention, abdominal pain, anal bleeding, blood in stool, constipation, diarrhea, nausea, rectal pain and vomiting.   Endocrine: Negative for cold intolerance, heat intolerance, polydipsia, polyphagia and polyuria.   Genitourinary: Negative for decreased urine volume, difficulty urinating, dysuria, enuresis, flank pain, frequency, genital sores, hematuria and urgency.   Musculoskeletal: Positive for back pain. Negative for arthralgias, gait problem, joint swelling, myalgias,  neck pain and neck stiffness.   Skin: Negative for color change, pallor, rash and wound.   Allergic/Immunologic: Negative for environmental allergies, food allergies and immunocompromised state.   Neurological: Positive for weakness, numbness and headaches. Negative for dizziness, tremors, seizures, syncope, facial asymmetry, speech difficulty and light-headedness.   Hematological: Negative for adenopathy. Does not bruise/bleed easily.   Psychiatric/Behavioral: Positive for sleep disturbance. Negative for agitation, behavioral problems, confusion, decreased concentration, dysphoric mood, hallucinations, self-injury and suicidal ideas. The patient is not nervous/anxious and is not hyperactive.        Objective    NEUROLOGICAL EXAMINATION:    SLR positive on the right at 30-35°.  Right ankle jerk absent, left ankle jerk intact.  Dorsiflexion intact bilaterally.  Numbness of the right lateral foot.    Assessment   Early recurrent disc herniation L5-S1 right with progressively intractable sciatica.       Plan   Recommend surgical discectomy L5-S1 on the right with inclusion of interbody fusion pedicle screw fixation because of the recurrence so early after the original surgery.       César Peraza MD

## 2019-01-09 DIAGNOSIS — Z98.890 S/P LUMBAR DISCECTOMY: ICD-10-CM

## 2019-01-09 RX ORDER — HYDROCODONE BITARTRATE AND ACETAMINOPHEN 5; 325 MG/1; MG/1
1 TABLET ORAL EVERY 8 HOURS PRN
Qty: 15 TABLET | Refills: 0 | Status: SHIPPED | OUTPATIENT
Start: 2019-01-09 | End: 2019-01-15 | Stop reason: SDUPTHER

## 2019-01-09 NOTE — TELEPHONE ENCOUNTER
Provider:  Stu  Caller: Elisa  Phone #:  [phone]  Surgery:  Lumbar Discectomy  Surgery Date:  10/17/18  Last visit:  1/3/19   Next visit: n/a    SHIRA:       12/14/2018 Hydrocodone/Acetaminophen  325MG/5MG  1985 20 6 RDAHA BARAJAS [facility]  [facility] 17 1  12/21/2018 Hydrocodone/Acetaminophen  325MG/5MG  1985 20 7 RADHA BARAJAS  [place] [facility]  [facility] 14 1  01/03/2019 Hydrocodone/Acetaminophen  325MG/5MG  1985 15 5 RUTH BARRETT [place] [facility]  [facility] 15 1  Reason for call:           Norco rf

## 2019-01-10 PROBLEM — M51.26 HERNIATION OF INTERVERTEBRAL DISC OF LUMBAR SPINE: Status: ACTIVE | Noted: 2019-01-10

## 2019-01-15 DIAGNOSIS — Z98.890 S/P LUMBAR DISCECTOMY: ICD-10-CM

## 2019-01-15 RX ORDER — HYDROCODONE BITARTRATE AND ACETAMINOPHEN 5; 325 MG/1; MG/1
1 TABLET ORAL EVERY 8 HOURS PRN
Qty: 15 TABLET | Refills: 0 | Status: SHIPPED | OUTPATIENT
Start: 2019-01-15 | End: 2019-02-11 | Stop reason: HOSPADM

## 2019-01-15 NOTE — TELEPHONE ENCOUNTER
Provider: Stu   Caller: self  Time of call: 10:20   Phone #:  [phone]  Surgery: LUMBAR LAMINECTOMY POSTERIOR LUMBAR INTERBODY FUSION right L5/S1    Surgery Date:  2/8/19, upcoming  Last visit:  1/3/19   Next visit:     SHIRA:    12/21/2018 Hydrocodone/Acetaminophen  325MG/5MG  1985 20 7 RADHA BARAJAS  [place] [employer]  [facility] 14 1  01/03/2019 Hydrocodone/Acetaminophen  325MG/5MG  1985 15 5 RUTH BARRETT [place] [employer]  [facility] 15 1  01/10/2019 Hydrocodone/Acetaminophen  325MG/5MG  1985 15 5 RUTH BARRETT [employer]  [facility] 15 1       Reason for call:       Requested Prescriptions     Pending Prescriptions Disp Refills   • HYDROcodone-acetaminophen (NORCO) 5-325 MG per tablet 15 tablet 0     Sig: Take 1 tablet by mouth Every 8 (Eight) Hours As Needed for Moderate Pain  or Severe Pain .

## 2019-01-22 RX ORDER — METHOCARBAMOL 750 MG/1
750 TABLET, FILM COATED ORAL 3 TIMES DAILY
Qty: 60 TABLET | Refills: 1 | Status: SHIPPED | OUTPATIENT
Start: 2019-01-22 | End: 2019-02-11 | Stop reason: HOSPADM

## 2019-01-22 NOTE — TELEPHONE ENCOUNTER
Provider: Stu   Caller: self  Time of call: 1:02    Phone #:  [phone]  Surgery: LUMBAR LAMINECTOMY POSTERIOR LUMBAR INTERBODY FUSION right L5/S1    Surgery Date:  2/8/19, upcoming  Last visit: 1/3/19    Next visit:     SHIRA:         Reason for call:       Requested Prescriptions     Pending Prescriptions Disp Refills   • methocarbamol (ROBAXIN) 750 MG tablet 60 tablet 1     Sig: Take 1 tablet by mouth 3 (Three) Times a Day.

## 2019-01-29 ENCOUNTER — PREP FOR SURGERY (OUTPATIENT)
Dept: OTHER | Facility: HOSPITAL | Age: 34
End: 2019-01-29

## 2019-01-29 DIAGNOSIS — M51.26 HERNIATION OF INTERVERTEBRAL DISC OF LUMBAR SPINE: Primary | ICD-10-CM

## 2019-01-29 RX ORDER — CEFAZOLIN SODIUM 2 G/100ML
2 INJECTION, SOLUTION INTRAVENOUS ONCE
Status: CANCELLED | OUTPATIENT
Start: 2019-01-29 | End: 2019-01-29

## 2019-01-29 RX ORDER — OXYCODONE HCL 10 MG/1
10 TABLET, FILM COATED, EXTENDED RELEASE ORAL ONCE
Status: CANCELLED | OUTPATIENT
Start: 2019-01-29 | End: 2019-01-29

## 2019-01-29 RX ORDER — SODIUM CHLORIDE AND POTASSIUM CHLORIDE 150; 900 MG/100ML; MG/100ML
50 INJECTION, SOLUTION INTRAVENOUS CONTINUOUS
Status: CANCELLED | OUTPATIENT
Start: 2019-01-29

## 2019-01-29 RX ORDER — CHLORHEXIDINE GLUCONATE 4 G/100ML
SOLUTION TOPICAL
Qty: 120 ML | Refills: 0 | Status: SHIPPED | OUTPATIENT
Start: 2019-01-29 | End: 2019-02-11 | Stop reason: HOSPADM

## 2019-01-29 RX ORDER — ACETAMINOPHEN 500 MG
1000 TABLET ORAL ONCE
Status: CANCELLED | OUTPATIENT
Start: 2019-01-29 | End: 2019-01-29

## 2019-01-29 RX ORDER — SODIUM CHLORIDE 0.9 % (FLUSH) 0.9 %
3 SYRINGE (ML) INJECTION EVERY 12 HOURS SCHEDULED
Status: CANCELLED | OUTPATIENT
Start: 2019-01-29

## 2019-01-29 RX ORDER — IBUPROFEN 800 MG/1
800 TABLET ORAL ONCE
Status: CANCELLED | OUTPATIENT
Start: 2019-01-29 | End: 2019-01-29

## 2019-01-29 RX ORDER — PREGABALIN 150 MG/1
150 CAPSULE ORAL ONCE
Status: CANCELLED | OUTPATIENT
Start: 2019-01-29 | End: 2019-01-29

## 2019-02-01 ENCOUNTER — TELEPHONE (OUTPATIENT)
Dept: NEUROSURGERY | Facility: CLINIC | Age: 34
End: 2019-02-01

## 2019-02-01 NOTE — TELEPHONE ENCOUNTER
She should f/u with her pcp the day before her surgery to get her ear checked. The antibiotcs are ok if she is responding well and her pcp should be able to tell when they look in her ear. Please let her know.

## 2019-02-01 NOTE — TELEPHONE ENCOUNTER
Provider:  Stu  Caller: Elisa    Phone #:  [phone]  Surgery:  Upcoming PLIF 2/8/19; s/p Lumbar Discectomy 10/17/18  Last visit:   1/3/19  Next visit: 2/8/19 Sx date    SHIRA:         Reason for call:           Pt states that she has an ear infection in which she was given an antibiotic for, wants to know if this will affect her scheduled surgery for next Friday. Also states that she has spot on her leg in which she had inspected by ED physician, states that she was told it was burn from heating pad. Please adv

## 2019-02-04 ENCOUNTER — APPOINTMENT (OUTPATIENT)
Dept: PREADMISSION TESTING | Facility: HOSPITAL | Age: 34
End: 2019-02-04

## 2019-02-04 VITALS — HEIGHT: 64 IN | WEIGHT: 141.54 LBS | BODY MASS INDEX: 24.16 KG/M2

## 2019-02-04 DIAGNOSIS — Z01.89 LABORATORY TEST: Primary | ICD-10-CM

## 2019-02-04 DIAGNOSIS — M51.26 HERNIATION OF INTERVERTEBRAL DISC OF LUMBAR SPINE: ICD-10-CM

## 2019-02-04 LAB
ABO GROUP BLD: NORMAL
ANION GAP SERPL CALCULATED.3IONS-SCNC: 3 MMOL/L (ref 3–11)
B-HCG UR QL: NEGATIVE
BASOPHILS # BLD AUTO: 0.02 10*3/MM3 (ref 0–0.2)
BASOPHILS NFR BLD AUTO: 0.2 % (ref 0–1)
BUN BLD-MCNC: 6 MG/DL (ref 9–23)
BUN/CREAT SERPL: 8.7 (ref 7–25)
CALCIUM SPEC-SCNC: 9.7 MG/DL (ref 8.7–10.4)
CHLORIDE SERPL-SCNC: 104 MMOL/L (ref 99–109)
CO2 SERPL-SCNC: 29 MMOL/L (ref 20–31)
CREAT BLD-MCNC: 0.69 MG/DL (ref 0.6–1.3)
DEPRECATED RDW RBC AUTO: 43.6 FL (ref 37–54)
EOSINOPHIL # BLD AUTO: 0.16 10*3/MM3 (ref 0–0.3)
EOSINOPHIL NFR BLD AUTO: 1.8 % (ref 0–3)
ERYTHROCYTE [DISTWIDTH] IN BLOOD BY AUTOMATED COUNT: 12.6 % (ref 11.3–14.5)
GFR SERPL CREATININE-BSD FRML MDRD: 98 ML/MIN/1.73
GLUCOSE BLD-MCNC: 74 MG/DL (ref 70–100)
HBA1C MFR BLD: 5 % (ref 4.8–5.6)
HCT VFR BLD AUTO: 44.7 % (ref 34.5–44)
HGB BLD-MCNC: 14.9 G/DL (ref 11.5–15.5)
IMM GRANULOCYTES # BLD AUTO: 0.02 10*3/MM3 (ref 0–0.03)
IMM GRANULOCYTES NFR BLD AUTO: 0.2 % (ref 0–0.6)
LYMPHOCYTES # BLD AUTO: 2.41 10*3/MM3 (ref 0.6–4.8)
LYMPHOCYTES NFR BLD AUTO: 26.4 % (ref 24–44)
MCH RBC QN AUTO: 31.9 PG (ref 27–31)
MCHC RBC AUTO-ENTMCNC: 33.3 G/DL (ref 32–36)
MCV RBC AUTO: 95.7 FL (ref 80–99)
MONOCYTES # BLD AUTO: 0.33 10*3/MM3 (ref 0–1)
MONOCYTES NFR BLD AUTO: 3.6 % (ref 0–12)
NEUTROPHILS # BLD AUTO: 6.22 10*3/MM3 (ref 1.5–8.3)
NEUTROPHILS NFR BLD AUTO: 68 % (ref 41–71)
PLATELET # BLD AUTO: 212 10*3/MM3 (ref 150–450)
PMV BLD AUTO: 11.9 FL (ref 6–12)
POTASSIUM BLD-SCNC: 3.5 MMOL/L (ref 3.5–5.5)
RBC # BLD AUTO: 4.67 10*6/MM3 (ref 3.89–5.14)
RH BLD: NEGATIVE
SODIUM BLD-SCNC: 136 MMOL/L (ref 132–146)
WBC NRBC COR # BLD: 9.14 10*3/MM3 (ref 3.5–10.8)

## 2019-02-04 PROCEDURE — 83036 HEMOGLOBIN GLYCOSYLATED A1C: CPT | Performed by: ANESTHESIOLOGY

## 2019-02-04 PROCEDURE — 36415 COLL VENOUS BLD VENIPUNCTURE: CPT

## 2019-02-04 PROCEDURE — 86901 BLOOD TYPING SEROLOGIC RH(D): CPT

## 2019-02-04 PROCEDURE — 87081 CULTURE SCREEN ONLY: CPT | Performed by: NEUROLOGICAL SURGERY

## 2019-02-04 PROCEDURE — 80048 BASIC METABOLIC PNL TOTAL CA: CPT | Performed by: NEUROLOGICAL SURGERY

## 2019-02-04 PROCEDURE — 81025 URINE PREGNANCY TEST: CPT | Performed by: NEUROLOGICAL SURGERY

## 2019-02-04 PROCEDURE — 93005 ELECTROCARDIOGRAM TRACING: CPT

## 2019-02-04 PROCEDURE — 86900 BLOOD TYPING SEROLOGIC ABO: CPT

## 2019-02-04 PROCEDURE — 85025 COMPLETE CBC W/AUTO DIFF WBC: CPT | Performed by: NEUROLOGICAL SURGERY

## 2019-02-04 PROCEDURE — 93010 ELECTROCARDIOGRAM REPORT: CPT | Performed by: INTERNAL MEDICINE

## 2019-02-04 RX ORDER — CEFDINIR 300 MG/1
300 CAPSULE ORAL 2 TIMES DAILY
COMMUNITY
End: 2019-02-11 | Stop reason: HOSPADM

## 2019-02-04 NOTE — PAT
Bactroban and Chlorhexidine Prescription given during PAT visit, as well as written and verbal instructions given to patient during PAT visit.    Patient to apply to surgical area (as instructed) the night before procedure and the AM of procedure.    Orders received from Elsi SHEFFIELD for ERAS. ERAS instructions given to patient.    Patient reports having ear infection and is currently on antibiotics. Patient to follow up with primary care on Thursday. Patient was instructed to notify Peraza's office of appointment after appointment. Notified Elsi SHEFFIELD

## 2019-02-06 LAB — MRSA SPEC QL CULT: NORMAL

## 2019-02-07 ENCOUNTER — ANESTHESIA EVENT (OUTPATIENT)
Dept: PERIOP | Facility: HOSPITAL | Age: 34
End: 2019-02-07

## 2019-02-07 RX ORDER — FAMOTIDINE 10 MG/ML
20 INJECTION, SOLUTION INTRAVENOUS ONCE
Status: CANCELLED | OUTPATIENT
Start: 2019-02-07 | End: 2019-02-07

## 2019-02-08 ENCOUNTER — APPOINTMENT (OUTPATIENT)
Dept: GENERAL RADIOLOGY | Facility: HOSPITAL | Age: 34
End: 2019-02-08

## 2019-02-08 ENCOUNTER — ANESTHESIA (OUTPATIENT)
Dept: PERIOP | Facility: HOSPITAL | Age: 34
End: 2019-02-08

## 2019-02-08 ENCOUNTER — HOSPITAL ENCOUNTER (INPATIENT)
Facility: HOSPITAL | Age: 34
LOS: 3 days | Discharge: HOME | End: 2019-02-11
Attending: NEUROLOGICAL SURGERY | Admitting: NEUROLOGICAL SURGERY

## 2019-02-08 DIAGNOSIS — Z78.9 IMPAIRED MOBILITY AND ADLS: ICD-10-CM

## 2019-02-08 DIAGNOSIS — M51.9 INTERVERTEBRAL DISC DISORDER: ICD-10-CM

## 2019-02-08 DIAGNOSIS — Z74.09 IMPAIRED FUNCTIONAL MOBILITY, BALANCE, GAIT, AND ENDURANCE: Primary | ICD-10-CM

## 2019-02-08 DIAGNOSIS — Z98.890 S/P LUMBAR DISCECTOMY: ICD-10-CM

## 2019-02-08 DIAGNOSIS — Z74.09 IMPAIRED MOBILITY AND ADLS: ICD-10-CM

## 2019-02-08 LAB
ABO GROUP BLD: NORMAL
BLD GP AB SCN SERPL QL: NEGATIVE
GLUCOSE BLDC GLUCOMTR-MCNC: 81 MG/DL (ref 70–130)
RH BLD: NEGATIVE
T&S EXPIRATION DATE: NORMAL

## 2019-02-08 PROCEDURE — 25010000002 HYDROMORPHONE PER 4 MG: Performed by: NURSE ANESTHETIST, CERTIFIED REGISTERED

## 2019-02-08 PROCEDURE — 61783 SCAN PROC SPINAL: CPT | Performed by: NEUROLOGICAL SURGERY

## 2019-02-08 PROCEDURE — 22853 INSJ BIOMECHANICAL DEVICE: CPT | Performed by: PHYSICIAN ASSISTANT

## 2019-02-08 PROCEDURE — 86901 BLOOD TYPING SEROLOGIC RH(D): CPT | Performed by: NEUROLOGICAL SURGERY

## 2019-02-08 PROCEDURE — 86850 RBC ANTIBODY SCREEN: CPT | Performed by: NEUROLOGICAL SURGERY

## 2019-02-08 PROCEDURE — 0SG30AJ FUSION OF LUMBOSACRAL JOINT WITH INTERBODY FUSION DEVICE, POSTERIOR APPROACH, ANTERIOR COLUMN, OPEN APPROACH: ICD-10-PCS | Performed by: NEUROLOGICAL SURGERY

## 2019-02-08 PROCEDURE — C1713 ANCHOR/SCREW BN/BN,TIS/BN: HCPCS | Performed by: NEUROLOGICAL SURGERY

## 2019-02-08 PROCEDURE — 25010000002 ONDANSETRON PER 1 MG: Performed by: ANESTHESIOLOGY

## 2019-02-08 PROCEDURE — 25010000002 MIDAZOLAM PER 1 MG: Performed by: ANESTHESIOLOGY

## 2019-02-08 PROCEDURE — 4A11X4G MONITORING OF PERIPHERAL NERVOUS ELECTRICAL ACTIVITY, INTRAOPERATIVE, EXTERNAL APPROACH: ICD-10-PCS | Performed by: NEUROLOGICAL SURGERY

## 2019-02-08 PROCEDURE — 8E0WXBG COMPUTER ASSISTED PROCEDURE OF TRUNK REGION, WITH COMPUTERIZED TOMOGRAPHY: ICD-10-PCS | Performed by: NEUROLOGICAL SURGERY

## 2019-02-08 PROCEDURE — C1769 GUIDE WIRE: HCPCS | Performed by: NEUROLOGICAL SURGERY

## 2019-02-08 PROCEDURE — 25010000002 PROPOFOL 10 MG/ML EMULSION: Performed by: NURSE ANESTHETIST, CERTIFIED REGISTERED

## 2019-02-08 PROCEDURE — 25010000002 FENTANYL CITRATE (PF) 100 MCG/2ML SOLUTION: Performed by: NURSE ANESTHETIST, CERTIFIED REGISTERED

## 2019-02-08 PROCEDURE — 22633 ARTHRD CMBN 1NTRSPC LUMBAR: CPT | Performed by: PHYSICIAN ASSISTANT

## 2019-02-08 PROCEDURE — 25010000003 CEFAZOLIN IN DEXTROSE 2-4 GM/100ML-% SOLUTION: Performed by: NEUROLOGICAL SURGERY

## 2019-02-08 PROCEDURE — 76000 FLUOROSCOPY <1 HR PHYS/QHP: CPT

## 2019-02-08 PROCEDURE — 22840 INSERT SPINE FIXATION DEVICE: CPT | Performed by: PHYSICIAN ASSISTANT

## 2019-02-08 PROCEDURE — 82962 GLUCOSE BLOOD TEST: CPT

## 2019-02-08 PROCEDURE — 99024 POSTOP FOLLOW-UP VISIT: CPT | Performed by: NEUROLOGICAL SURGERY

## 2019-02-08 PROCEDURE — 22853 INSJ BIOMECHANICAL DEVICE: CPT | Performed by: NEUROLOGICAL SURGERY

## 2019-02-08 PROCEDURE — 25010000002 MIDAZOLAM PER 1 MG: Performed by: NURSE ANESTHETIST, CERTIFIED REGISTERED

## 2019-02-08 PROCEDURE — 22633 ARTHRD CMBN 1NTRSPC LUMBAR: CPT | Performed by: NEUROLOGICAL SURGERY

## 2019-02-08 PROCEDURE — 20936 SP BONE AGRFT LOCAL ADD-ON: CPT | Performed by: NEUROLOGICAL SURGERY

## 2019-02-08 PROCEDURE — 25010000002 NEOSTIGMINE 10 MG/10ML SOLUTION: Performed by: ANESTHESIOLOGY

## 2019-02-08 PROCEDURE — 25010000003 MORPHINE 5 MG/ML SOLUTION: Performed by: NURSE ANESTHETIST, CERTIFIED REGISTERED

## 2019-02-08 PROCEDURE — 0ST40ZZ RESECTION OF LUMBOSACRAL DISC, OPEN APPROACH: ICD-10-PCS | Performed by: NEUROLOGICAL SURGERY

## 2019-02-08 PROCEDURE — 25010000002 HYDROMORPHONE HCL-NACL 30-0.9 MG/30ML-% SOLUTION PREFILLED SYRINGE: Performed by: NEUROLOGICAL SURGERY

## 2019-02-08 PROCEDURE — 25010000002 ONDANSETRON PER 1 MG: Performed by: NEUROLOGICAL SURGERY

## 2019-02-08 PROCEDURE — 22840 INSERT SPINE FIXATION DEVICE: CPT | Performed by: NEUROLOGICAL SURGERY

## 2019-02-08 PROCEDURE — 86900 BLOOD TYPING SEROLOGIC ABO: CPT | Performed by: NEUROLOGICAL SURGERY

## 2019-02-08 DEVICE — MAS 54850016540 4.75 EXT TAB CANN 6.5X40
Type: IMPLANTABLE DEVICE | Site: SPINE LUMBAR | Status: FUNCTIONAL
Brand: CD HORIZON® SOLERA® SPINAL SYSTEM

## 2019-02-08 DEVICE — BIOLOGIC 7600105 85 BTCP 15 HA 5CC VIAL
Type: IMPLANTABLE DEVICE | Site: SPINE LUMBAR | Status: FUNCTIONAL
Brand: MASTERGRAFT® GRANULES

## 2019-02-08 DEVICE — SPACER 3992612 26MM X 12MM
Type: IMPLANTABLE DEVICE | Site: SPINE LUMBAR | Status: FUNCTIONAL
Brand: CAPSTONE PTC™ SPINAL SYSTEM

## 2019-02-08 DEVICE — SCRW ST SOLERA VOYAGER BRKOFF TI 4.75MM: Type: IMPLANTABLE DEVICE | Site: SPINE LUMBAR | Status: FUNCTIONAL

## 2019-02-08 DEVICE — DBM T45010 10CC PASTE GRAFTON PLUS
Type: IMPLANTABLE DEVICE | Site: SPINE LUMBAR | Status: FUNCTIONAL
Brand: GRAFTON®AND GRAFTON PLUS®DEMINERALIZED BONE MATRIX (DBM)

## 2019-02-08 RX ORDER — PROPOFOL 10 MG/ML
VIAL (ML) INTRAVENOUS AS NEEDED
Status: DISCONTINUED | OUTPATIENT
Start: 2019-02-08 | End: 2019-02-08 | Stop reason: SURG

## 2019-02-08 RX ORDER — IBUPROFEN 800 MG/1
800 TABLET ORAL ONCE
Status: COMPLETED | OUTPATIENT
Start: 2019-02-08 | End: 2019-02-08

## 2019-02-08 RX ORDER — SENNA AND DOCUSATE SODIUM 50; 8.6 MG/1; MG/1
1 TABLET, FILM COATED ORAL NIGHTLY PRN
Status: DISCONTINUED | OUTPATIENT
Start: 2019-02-08 | End: 2019-02-11 | Stop reason: HOSPADM

## 2019-02-08 RX ORDER — MIDAZOLAM HYDROCHLORIDE 1 MG/ML
2 INJECTION INTRAMUSCULAR; INTRAVENOUS ONCE
Status: COMPLETED | OUTPATIENT
Start: 2019-02-08 | End: 2019-02-08

## 2019-02-08 RX ORDER — PROMETHAZINE HYDROCHLORIDE 25 MG/ML
12.5 INJECTION, SOLUTION INTRAMUSCULAR; INTRAVENOUS EVERY 6 HOURS PRN
Status: DISCONTINUED | OUTPATIENT
Start: 2019-02-08 | End: 2019-02-11 | Stop reason: HOSPADM

## 2019-02-08 RX ORDER — NALOXONE HCL 0.4 MG/ML
0.1 VIAL (ML) INJECTION
Status: DISCONTINUED | OUTPATIENT
Start: 2019-02-08 | End: 2019-02-11 | Stop reason: HOSPADM

## 2019-02-08 RX ORDER — LIDOCAINE HYDROCHLORIDE 10 MG/ML
0.5 INJECTION, SOLUTION EPIDURAL; INFILTRATION; INTRACAUDAL; PERINEURAL ONCE AS NEEDED
Status: COMPLETED | OUTPATIENT
Start: 2019-02-08 | End: 2019-02-08

## 2019-02-08 RX ORDER — FENTANYL CITRATE 50 UG/ML
50 INJECTION, SOLUTION INTRAMUSCULAR; INTRAVENOUS
Status: DISCONTINUED | OUTPATIENT
Start: 2019-02-08 | End: 2019-02-08 | Stop reason: HOSPADM

## 2019-02-08 RX ORDER — TEMAZEPAM 15 MG/1
15 CAPSULE ORAL NIGHTLY PRN
Status: DISCONTINUED | OUTPATIENT
Start: 2019-02-08 | End: 2019-02-11 | Stop reason: HOSPADM

## 2019-02-08 RX ORDER — SODIUM CHLORIDE 0.9 % (FLUSH) 0.9 %
3 SYRINGE (ML) INJECTION EVERY 12 HOURS SCHEDULED
Status: DISCONTINUED | OUTPATIENT
Start: 2019-02-08 | End: 2019-02-08 | Stop reason: HOSPADM

## 2019-02-08 RX ORDER — SODIUM CHLORIDE, SODIUM LACTATE, POTASSIUM CHLORIDE, CALCIUM CHLORIDE 600; 310; 30; 20 MG/100ML; MG/100ML; MG/100ML; MG/100ML
9 INJECTION, SOLUTION INTRAVENOUS CONTINUOUS
Status: DISCONTINUED | OUTPATIENT
Start: 2019-02-08 | End: 2019-02-11 | Stop reason: HOSPADM

## 2019-02-08 RX ORDER — CEFAZOLIN SODIUM 2 G/100ML
2 INJECTION, SOLUTION INTRAVENOUS ONCE
Status: COMPLETED | OUTPATIENT
Start: 2019-02-08 | End: 2019-02-08

## 2019-02-08 RX ORDER — DEXAMETHASONE SODIUM PHOSPHATE 4 MG/ML
8 INJECTION, SOLUTION INTRA-ARTICULAR; INTRALESIONAL; INTRAMUSCULAR; INTRAVENOUS; SOFT TISSUE ONCE AS NEEDED
Status: DISCONTINUED | OUTPATIENT
Start: 2019-02-08 | End: 2019-02-08 | Stop reason: HOSPADM

## 2019-02-08 RX ORDER — CETIRIZINE HYDROCHLORIDE 10 MG/1
10 TABLET ORAL NIGHTLY
Status: DISCONTINUED | OUTPATIENT
Start: 2019-02-08 | End: 2019-02-11 | Stop reason: HOSPADM

## 2019-02-08 RX ORDER — SODIUM CHLORIDE AND POTASSIUM CHLORIDE 150; 900 MG/100ML; MG/100ML
50 INJECTION, SOLUTION INTRAVENOUS CONTINUOUS
Status: DISCONTINUED | OUTPATIENT
Start: 2019-02-08 | End: 2019-02-11 | Stop reason: HOSPADM

## 2019-02-08 RX ORDER — MAGNESIUM HYDROXIDE 1200 MG/15ML
LIQUID ORAL AS NEEDED
Status: DISCONTINUED | OUTPATIENT
Start: 2019-02-08 | End: 2019-02-08 | Stop reason: HOSPADM

## 2019-02-08 RX ORDER — METHOCARBAMOL 500 MG/1
1000 TABLET, FILM COATED ORAL 4 TIMES DAILY PRN
Status: DISCONTINUED | OUTPATIENT
Start: 2019-02-08 | End: 2019-02-11 | Stop reason: HOSPADM

## 2019-02-08 RX ORDER — FAMOTIDINE 20 MG/1
20 TABLET, FILM COATED ORAL DAILY
Status: DISCONTINUED | OUTPATIENT
Start: 2019-02-09 | End: 2019-02-11 | Stop reason: HOSPADM

## 2019-02-08 RX ORDER — BISACODYL 5 MG/1
5 TABLET, DELAYED RELEASE ORAL DAILY PRN
Status: DISCONTINUED | OUTPATIENT
Start: 2019-02-08 | End: 2019-02-11 | Stop reason: HOSPADM

## 2019-02-08 RX ORDER — ONDANSETRON 2 MG/ML
4 INJECTION INTRAMUSCULAR; INTRAVENOUS ONCE AS NEEDED
Status: DISCONTINUED | OUTPATIENT
Start: 2019-02-08 | End: 2019-02-08 | Stop reason: HOSPADM

## 2019-02-08 RX ORDER — MORPHINE SULFATE 5 MG/ML
INJECTION, SOLUTION INTRAMUSCULAR; INTRAVENOUS AS NEEDED
Status: DISCONTINUED | OUTPATIENT
Start: 2019-02-08 | End: 2019-02-08 | Stop reason: SURG

## 2019-02-08 RX ORDER — SODIUM CHLORIDE 0.9 % (FLUSH) 0.9 %
3-10 SYRINGE (ML) INJECTION AS NEEDED
Status: DISCONTINUED | OUTPATIENT
Start: 2019-02-08 | End: 2019-02-11 | Stop reason: HOSPADM

## 2019-02-08 RX ORDER — ONDANSETRON 2 MG/ML
INJECTION INTRAMUSCULAR; INTRAVENOUS AS NEEDED
Status: DISCONTINUED | OUTPATIENT
Start: 2019-02-08 | End: 2019-02-08 | Stop reason: SURG

## 2019-02-08 RX ORDER — NEOSTIGMINE METHYLSULFATE 1 MG/ML
INJECTION, SOLUTION INTRAVENOUS AS NEEDED
Status: DISCONTINUED | OUTPATIENT
Start: 2019-02-08 | End: 2019-02-08 | Stop reason: SURG

## 2019-02-08 RX ORDER — PROMETHAZINE HYDROCHLORIDE 12.5 MG/1
12.5 TABLET ORAL EVERY 6 HOURS PRN
Status: DISCONTINUED | OUTPATIENT
Start: 2019-02-08 | End: 2019-02-11 | Stop reason: HOSPADM

## 2019-02-08 RX ORDER — ACETAMINOPHEN 325 MG/1
650 TABLET ORAL EVERY 4 HOURS PRN
Status: DISCONTINUED | OUTPATIENT
Start: 2019-02-08 | End: 2019-02-11 | Stop reason: HOSPADM

## 2019-02-08 RX ORDER — HYDROMORPHONE HYDROCHLORIDE 1 MG/ML
0.5 INJECTION, SOLUTION INTRAMUSCULAR; INTRAVENOUS; SUBCUTANEOUS
Status: DISCONTINUED | OUTPATIENT
Start: 2019-02-08 | End: 2019-02-08 | Stop reason: HOSPADM

## 2019-02-08 RX ORDER — SODIUM CHLORIDE, SODIUM LACTATE, POTASSIUM CHLORIDE, CALCIUM CHLORIDE 600; 310; 30; 20 MG/100ML; MG/100ML; MG/100ML; MG/100ML
75 INJECTION, SOLUTION INTRAVENOUS CONTINUOUS
Status: DISCONTINUED | OUTPATIENT
Start: 2019-02-08 | End: 2019-02-11 | Stop reason: HOSPADM

## 2019-02-08 RX ORDER — OXYCODONE HCL 10 MG/1
10 TABLET, FILM COATED, EXTENDED RELEASE ORAL ONCE
Status: COMPLETED | OUTPATIENT
Start: 2019-02-08 | End: 2019-02-08

## 2019-02-08 RX ORDER — ONDANSETRON 2 MG/ML
4 INJECTION INTRAMUSCULAR; INTRAVENOUS EVERY 6 HOURS PRN
Status: DISCONTINUED | OUTPATIENT
Start: 2019-02-08 | End: 2019-02-11 | Stop reason: HOSPADM

## 2019-02-08 RX ORDER — BUPIVACAINE HYDROCHLORIDE AND EPINEPHRINE 2.5; 5 MG/ML; UG/ML
INJECTION, SOLUTION EPIDURAL; INFILTRATION; INTRACAUDAL; PERINEURAL AS NEEDED
Status: DISCONTINUED | OUTPATIENT
Start: 2019-02-08 | End: 2019-02-08 | Stop reason: HOSPADM

## 2019-02-08 RX ORDER — ONDANSETRON 4 MG/1
4 TABLET, FILM COATED ORAL EVERY 6 HOURS PRN
Status: DISCONTINUED | OUTPATIENT
Start: 2019-02-08 | End: 2019-02-11 | Stop reason: HOSPADM

## 2019-02-08 RX ORDER — NALOXONE HCL 0.4 MG/ML
0.4 VIAL (ML) INJECTION
Status: DISCONTINUED | OUTPATIENT
Start: 2019-02-08 | End: 2019-02-11 | Stop reason: HOSPADM

## 2019-02-08 RX ORDER — ACETAMINOPHEN 500 MG
1000 TABLET ORAL ONCE
Status: COMPLETED | OUTPATIENT
Start: 2019-02-08 | End: 2019-02-08

## 2019-02-08 RX ORDER — FAMOTIDINE 20 MG/1
20 TABLET, FILM COATED ORAL ONCE
Status: COMPLETED | OUTPATIENT
Start: 2019-02-08 | End: 2019-02-08

## 2019-02-08 RX ORDER — FENTANYL CITRATE 50 UG/ML
INJECTION, SOLUTION INTRAMUSCULAR; INTRAVENOUS AS NEEDED
Status: DISCONTINUED | OUTPATIENT
Start: 2019-02-08 | End: 2019-02-08 | Stop reason: SURG

## 2019-02-08 RX ORDER — PROMETHAZINE HYDROCHLORIDE 12.5 MG/1
12.5 SUPPOSITORY RECTAL EVERY 6 HOURS PRN
Status: DISCONTINUED | OUTPATIENT
Start: 2019-02-08 | End: 2019-02-11 | Stop reason: HOSPADM

## 2019-02-08 RX ORDER — SODIUM CHLORIDE 0.9 % (FLUSH) 0.9 %
3-10 SYRINGE (ML) INJECTION AS NEEDED
Status: DISCONTINUED | OUTPATIENT
Start: 2019-02-08 | End: 2019-02-08 | Stop reason: HOSPADM

## 2019-02-08 RX ORDER — GLYCOPYRROLATE 0.2 MG/ML
INJECTION INTRAMUSCULAR; INTRAVENOUS AS NEEDED
Status: DISCONTINUED | OUTPATIENT
Start: 2019-02-08 | End: 2019-02-08 | Stop reason: SURG

## 2019-02-08 RX ORDER — CEFAZOLIN SODIUM 2 G/100ML
2 INJECTION, SOLUTION INTRAVENOUS EVERY 8 HOURS
Status: COMPLETED | OUTPATIENT
Start: 2019-02-09 | End: 2019-02-09

## 2019-02-08 RX ORDER — ALBUTEROL SULFATE 2.5 MG/3ML
2.5 SOLUTION RESPIRATORY (INHALATION) EVERY 4 HOURS PRN
Status: DISCONTINUED | OUTPATIENT
Start: 2019-02-08 | End: 2019-02-11 | Stop reason: HOSPADM

## 2019-02-08 RX ORDER — ROCURONIUM BROMIDE 10 MG/ML
INJECTION, SOLUTION INTRAVENOUS AS NEEDED
Status: DISCONTINUED | OUTPATIENT
Start: 2019-02-08 | End: 2019-02-08 | Stop reason: SURG

## 2019-02-08 RX ORDER — DIPHENHYDRAMINE HCL 25 MG
25 CAPSULE ORAL EVERY 6 HOURS PRN
Status: DISCONTINUED | OUTPATIENT
Start: 2019-02-08 | End: 2019-02-11 | Stop reason: HOSPADM

## 2019-02-08 RX ORDER — LIDOCAINE HYDROCHLORIDE 20 MG/ML
INJECTION, SOLUTION INFILTRATION; PERINEURAL AS NEEDED
Status: DISCONTINUED | OUTPATIENT
Start: 2019-02-08 | End: 2019-02-08 | Stop reason: SURG

## 2019-02-08 RX ORDER — OXYCODONE AND ACETAMINOPHEN 7.5; 325 MG/1; MG/1
1 TABLET ORAL EVERY 4 HOURS PRN
Status: DISCONTINUED | OUTPATIENT
Start: 2019-02-08 | End: 2019-02-11 | Stop reason: HOSPADM

## 2019-02-08 RX ORDER — PREGABALIN 150 MG/1
150 CAPSULE ORAL ONCE
Status: DISCONTINUED | OUTPATIENT
Start: 2019-02-08 | End: 2019-02-08 | Stop reason: HOSPADM

## 2019-02-08 RX ORDER — MIDAZOLAM HYDROCHLORIDE 1 MG/ML
INJECTION INTRAMUSCULAR; INTRAVENOUS AS NEEDED
Status: DISCONTINUED | OUTPATIENT
Start: 2019-02-08 | End: 2019-02-08 | Stop reason: SURG

## 2019-02-08 RX ADMIN — LIDOCAINE HYDROCHLORIDE 50 MG: 20 INJECTION, SOLUTION INFILTRATION; PERINEURAL at 15:19

## 2019-02-08 RX ADMIN — FENTANYL CITRATE 50 MCG: 50 INJECTION, SOLUTION INTRAMUSCULAR; INTRAVENOUS at 15:20

## 2019-02-08 RX ADMIN — GLYCOPYRROLATE 0.4 MG: 0.2 INJECTION, SOLUTION INTRAMUSCULAR; INTRAVENOUS at 18:06

## 2019-02-08 RX ADMIN — CETIRIZINE HYDROCHLORIDE 10 MG: 10 TABLET, FILM COATED ORAL at 21:28

## 2019-02-08 RX ADMIN — MIDAZOLAM HYDROCHLORIDE 2 MG: 1 INJECTION, SOLUTION INTRAMUSCULAR; INTRAVENOUS at 15:09

## 2019-02-08 RX ADMIN — HYDROMORPHONE HYDROCHLORIDE 50 MG: 1 INJECTION, SOLUTION INTRAMUSCULAR; INTRAVENOUS; SUBCUTANEOUS at 18:59

## 2019-02-08 RX ADMIN — CEFAZOLIN SODIUM 2 G: 2 INJECTION, SOLUTION INTRAVENOUS at 15:15

## 2019-02-08 RX ADMIN — ACETAMINOPHEN 1000 MG: 500 TABLET ORAL at 12:54

## 2019-02-08 RX ADMIN — OXYCODONE HYDROCHLORIDE 10 MG: 10 TABLET, FILM COATED, EXTENDED RELEASE ORAL at 12:54

## 2019-02-08 RX ADMIN — SODIUM CHLORIDE, POTASSIUM CHLORIDE, SODIUM LACTATE AND CALCIUM CHLORIDE 9 ML/HR: 600; 310; 30; 20 INJECTION, SOLUTION INTRAVENOUS at 12:55

## 2019-02-08 RX ADMIN — FAMOTIDINE 20 MG: 20 TABLET, FILM COATED ORAL at 12:55

## 2019-02-08 RX ADMIN — IBUPROFEN 800 MG: 800 TABLET ORAL at 12:55

## 2019-02-08 RX ADMIN — FENTANYL CITRATE 50 MCG: 50 INJECTION, SOLUTION INTRAMUSCULAR; INTRAVENOUS at 16:00

## 2019-02-08 RX ADMIN — FENTANYL CITRATE: 50 INJECTION INTRAMUSCULAR; INTRAVENOUS at 19:07

## 2019-02-08 RX ADMIN — NEOSTIGMINE METHYLSULFATE 3 MG: 1 INJECTION, SOLUTION INTRAVENOUS at 18:06

## 2019-02-08 RX ADMIN — LIDOCAINE HYDROCHLORIDE 0.5 ML: 10 INJECTION, SOLUTION EPIDURAL; INFILTRATION; INTRACAUDAL; PERINEURAL at 12:54

## 2019-02-08 RX ADMIN — ROCURONIUM BROMIDE 30 MG: 10 SOLUTION INTRAVENOUS at 15:20

## 2019-02-08 RX ADMIN — ROCURONIUM BROMIDE 10 MG: 10 SOLUTION INTRAVENOUS at 15:37

## 2019-02-08 RX ADMIN — MIDAZOLAM HYDROCHLORIDE 2 MG: 1 INJECTION, SOLUTION INTRAMUSCULAR; INTRAVENOUS at 15:15

## 2019-02-08 RX ADMIN — HYDROMORPHONE HYDROCHLORIDE: 1 INJECTION, SOLUTION INTRAMUSCULAR; INTRAVENOUS; SUBCUTANEOUS at 18:45

## 2019-02-08 RX ADMIN — CEFAZOLIN SODIUM 2 G: 2 INJECTION, SOLUTION INTRAVENOUS at 23:35

## 2019-02-08 RX ADMIN — ONDANSETRON 4 MG: 2 INJECTION INTRAMUSCULAR; INTRAVENOUS at 20:48

## 2019-02-08 RX ADMIN — PROPOFOL 200 MG: 10 INJECTION, EMULSION INTRAVENOUS at 15:20

## 2019-02-08 RX ADMIN — SODIUM CHLORIDE, POTASSIUM CHLORIDE, SODIUM LACTATE AND CALCIUM CHLORIDE 75 ML/HR: 600; 310; 30; 20 INJECTION, SOLUTION INTRAVENOUS at 20:25

## 2019-02-08 RX ADMIN — ONDANSETRON 4 MG: 2 INJECTION INTRAMUSCULAR; INTRAVENOUS at 18:00

## 2019-02-08 RX ADMIN — ROCURONIUM BROMIDE 10 MG: 10 SOLUTION INTRAVENOUS at 16:00

## 2019-02-08 RX ADMIN — MORPHINE SULFATE 0.4 MG: 5 INJECTION INTRAMUSCULAR; INTRAVENOUS; SUBCUTANEOUS at 17:17

## 2019-02-08 RX ADMIN — Medication: at 18:53

## 2019-02-08 NOTE — ANESTHESIA PREPROCEDURE EVALUATION
Anesthesia Evaluation     Patient summary reviewed and Nursing notes reviewed   no history of anesthetic complications:  NPO Solid Status: > 8 hours  NPO Liquid Status: > 8 hours           Airway   Mallampati: II  TM distance: >3 FB  Neck ROM: full  No difficulty expected  Dental - normal exam     Pulmonary - normal exam    breath sounds clear to auscultation  (+) a smoker (1ppd) Current, asthma (mild),   Cardiovascular - normal exam    ECG reviewed  Rhythm: regular  Rate: normal        Neuro/Psych- negative ROS  GI/Hepatic/Renal/Endo    (+)  GERD well controlled,      Musculoskeletal     (+) back pain,   Abdominal    Substance History      OB/GYN          Other                        Anesthesia Plan    ASA 2     general     intravenous induction   Anesthetic plan, all risks, benefits, and alternatives have been provided, discussed and informed consent has been obtained with: patient.    Plan discussed with CRNA.

## 2019-02-08 NOTE — PAYOR COMM NOTE
"Just wanted to notify you that patient is in house, approved 1 day. No op note yet. Thanks    Marielena King RN CM   Phone [phone]  Fax [phone]      Elisa Griffith (33 y.o. Female)     Date of Birth Social Security Number Address Home Phone MRN    1985  [address] [id number] [phone] [id number]    Hinduism Marital Status          Unknown Single       Admission Date Admission Type Admitting Provider Attending Provider Department, Room/Bed    2/8/19 Elective César Peraza MD Bean, James R, MD [facility] OR, [facility] OR/NONE    Discharge Date Discharge Disposition Discharge Destination                       Attending Provider:  César Peraza MD    Allergies:  Asa [Aspirin], Bean Pod Extract, Diphth-acell Pertussis-tetanus, Penicillins, Soybean-containing Drug Products, Strawberry Flavor    Isolation:  None   Infection:  None   Code Status:  Prior    Ht:  162.6 cm (64\")   Wt:  64.2 kg (141 lb 8.6 oz)    Admission Cmt:  None   Principal Problem:  Herniation of intervertebral disc of lumbar spine [M51.26] More...                 Active Insurance as of 2/8/2019     Primary Coverage     Payor Plan Insurance Group Employer/Plan Group    [employer] [facility] [employer] [facility]      Payor Plan Address Payor Plan Phone Number Payor Plan Fax Number Effective Dates    [address] [address]   11/1/2016 - None Entered    [place] [address]       Subscriber Name Subscriber Birth Date Member ID       ELISA GRIFFITH 1985 [id number]                 Emergency Contacts      (Rel.) Home Phone Work Phone Mobile Phone    TomILANYeimy Davis (Mother) -- -- [phone]               History & Physical      Keira Alcantar APRN at 2/8/2019 12:31 PM          Pre-Op H&P  Elisa Griffith  [id number]  1985    Chief complaint: Low back pain into RLE with numbness and tingling    HPI:    1/3/19 office visit:  Elisa Griffith is a 33 y.o. female who presents " "for follow up of  right hip and leg pain.      The patient had a lumbar discectomy at L5-S1 on the right on 10/17/18, 2-1/2 months ago.  After 6 weeks she had recurrent pain to her right hip and leg that has become intractable and he has failed to respond to exercise, steroids, muscle relaxers, and recurring use of her therapy postop instructions.     Lumbar myelogram shows recurrent disc herniation L5-S1 on the right.    19:  Here today for LUMBAR LAMINECTOMY, POSTERIOR LUMBAR INTERBODY FUSION right L5/S1    Review of Systems:  General ROS: negative for chills, fever or skin lesions;  No changes since last office visit  Cardiovascular ROS: no chest pain or dyspnea on exertion  Respiratory ROS: no cough, shortness of breath, or wheezing    Allergies:   Allergies   Allergen Reactions   • Asa [Aspirin] GI Intolerance and Unknown (See Comments)     Gi upset    • Bean Pod Extract GI Intolerance     MILK AND SOY   • Diphth-Acell Pertussis-Tetanus Unknown (See Comments)     \" legs swelling\" happened when was an infant    • Penicillins GI Intolerance   • Soybean-Containing Drug Products GI Intolerance     All soy   • Strawberry Flavor GI Intolerance       Home Meds:    No current facility-administered medications on file prior to encounter.      Current Outpatient Medications on File Prior to Encounter   Medication Sig Dispense Refill   • cetirizine (zyrTEC) 10 MG tablet Take 10 mg by mouth Every Night.     • diphenhydrAMINE (BENADRYL) 25 mg capsule Take 25 mg by mouth Every 6 (Six) Hours As Needed for Itching.     • raNITIdine (ZANTAC) 150 MG tablet Take 150 mg by mouth At Night As Needed for Heartburn or Indigestion.     • VENTOLIN  (90 Base) MCG/ACT inhaler Inhale 2 puffs Every 4 (Four) Hours As Needed for Wheezing.         PMH:   Past Medical History:   Diagnosis Date   • Asthma    • GERD (gastroesophageal reflux disease)    • Headache    • History of seizure as     • History of staph infection  "    right leg after delivery of daughter; po meds only     • Hx of migraines    • Lower back pain    • Lumbar herniated disc    • MRSA infection     history of   • Stomach ulcer      PSH:    Past Surgical History:   Procedure Laterality Date   • ANTERIOR CERVICAL DISCECTOMY W/ FUSION Bilateral 4/18/2018    Procedure: CERVICAL DISCECTOMY ANTERIOR WITH FUSION C6-7  BILATERAL;  Surgeon: César Peraza MD;  Location:  [facility] OR;  Service: Neurosurgery   • INTERVENTIONAL RADIOLOGY PROCEDURE N/A 2/1/2018    Procedure: myelogram cervical spine ;  Surgeon: Abdi Santana MD;  Location:  [facility]IVE [facility];  Service:    • INTERVENTIONAL RADIOLOGY PROCEDURE N/A 9/6/2018    Procedure: myelogram lumbar spine;  Surgeon: Abdi Santana MD;  Location:  [facility];  Service: Interventional Radiology   • INTERVENTIONAL RADIOLOGY PROCEDURE N/A 12/18/2018    Procedure: IR myelogram lumbar spine;  Surgeon: Abdi Santana MD;  Location:  [facility];  Service: Interventional Radiology   • LUMBAR DISCECTOMY Right 10/17/2018    Procedure: LUMBAR DISCECTOMY L5-S1 RIGHT, MINIMALLY INVASIVE;  Surgeon: César Peraza MD;  Location:  [facility] OR;  Service: Neurosurgery   • SINUS SURGERY         Social History:   Tobacco:   Social History     Tobacco Use   Smoking Status Current Every Day Smoker   • Packs/day: 0.50   • Years: 10.00   • Pack years: 5.00   Smokeless Tobacco Never Used      Alcohol:     Social History     Substance and Sexual Activity   Alcohol Use No       Vitals:           There were no vitals taken for this visit.    Physical Exam:  General Appearance:    Alert, cooperative, no distress, appears stated age   Head:    Normocephalic, without obvious abnormality, atraumatic   Lungs:     Clear to auscultation bilaterally, respirations unlabored    Heart:   Regular rate and rhythm, S1 and S2 normal, no murmur, rub    or gallop    Abdomen:    Soft, non-tender.    Breast Exam:    deferred    Genitalia:    deferred   Extremities:   Extremities normal, atraumatic, no cyanosis or edema   Skin:   Skin color, texture, turgor normal, no rashes or lesions   Neurologic:   Grossly intact   Results Review  I reviewed the patient's new clinical results.    Cancer Staging (if applicable)  Cancer Patient: __ yes _x_no __unknown; If yes, clinical stage T:__ N:__M:__, stage group or __N/A      Assessment:  Early recurrent disc herniation L5-S1 right with progressively intractable sciatica.       Plan      Recommend surgical discectomy L5-S1 on the right with inclusion of interbody fusion pedicle screw fixation because of the recurrence so early after the original surgery.      RENITA Carney   2/8/2019   12:31 PM    Electronically signed by Keira Alcantar APRN at 2/8/2019 12:35 PM

## 2019-02-08 NOTE — OP NOTE
OPERATIVE REPORT    DATE OF OPERATION: 2/8/2019    PREOPERATIVE DIAGNOSIS: Recurrent disc herniation L5-S1 right    POSTOPERATIVE DIAGNOSIS: Same    PROCEDURE PERFORMED: Right L5-S1 transforaminal posterior lumbar interbody fusion with pedicle screw fixation    SURGEON: César Peraza MD    ASSISTANT: Elsi Damon PA-C    INDICATIONS: Patient had a lumbar discectomy L5-S1 on the right and October and within 2 months developed recurrent severe pain to the right hip and leg.  Myelogram confirmed this was a significantly large lumbar disc herniation recurrent at the prior site.  Because of early recurrence, discectomy with interbody fusion was selected to prevent additional recurrences subsequently.    DESCRIPTON OF PROCEDURE: Surgery was performed under general anesthesia in the prone position on blanket rolls on the Luigi table with a Moss catheter in place and EMG electrodes placed in the lower extremities for neuromonitoring.  The back was prepared sterilely and draped. A percutaneous bone pin was placed in the right posterior iliac crest and a Stealth stereotactic reference frame attached.  An O-arm CT image was performed and used for stereotactic guidance throughout the remainder of the case.    The stereotactic wand was used to locate the pedicles of L5 and S1 bilaterally and paramedian skin incisions were made on each side in line with the trajectory into the pedicles.  A  stereotactically guided tap was used to create threaded access into each pedicle. A stereotactically guided dilator was used to place a guidewire into each of the four pedicles and the guidewires were curved down and attached to the surgical drapes.    On the right side a stereotactic dilator was used to locate the L5-S1 facet complex.  Progressive dilators were used to place a 6 cm length x 22 mm diameter tubular retractor.  Soft tissue over the facet and lamina was removed with a pituitary rongeur. A high speed drill was used to remove  the facet complex including both the inferior and superior facet components as well as the lamina.  Bone shavings were saved in a Lukens trap for interbody grafting.  A 3 mm punch was used to remove the residual ligamentum flavum and scar to expose the lateral dura and nerve root sleeve which had dense scar attached to it, and appeared to have a disc herniation associated with a but difficult to distinguish from the dural margin of the nerve root sleeve.    Therefore a knife was used to open the disc space lateral to the scar and dural margin.. Discectomy with cartilaginous endplate removal was performed using a series of rotating dean and upward angled and side-scraping curettes. The nuclear disc and cartilaginous endplate were removed to expose the bony endplate.  Disc and endplate removal was accomplished bilaterally through the right unilateral entry site and down to the anterior annulus.  Finally after thorough endplate removal a reverse cup curette was passed under the dura and a large hard fragment was found and extracted which fully loosen the dura and nerve root sleeve.     A 12 x 26 mm Capstone trial distractor was used to determine the disc space height for selection of the size of the interbody cage.  A funnel was used to fill the disc space with an interbody graft combination of demineralized bone matrix, Mastergraft, and autograft bone shavings.  A 12 x 26 mm Capstone cage filled with graft was inserted using a stereotactically guided .   Gelfoam was used for hemostasis.  0.4 mg of Duramorph was injected intradurally using a 26-gauge spinal needle.  Gelfoam was placed over the facetectomy site after ensuring complete hemostasis.    Pedicle screws were then placed through the previously placed pedicle access holes using stereotactic guidance.  A 6.5 x 35 mm screw was placed stereotactically over the guidewire at S1 on the left and a 6.5 x 40 mm screw was placed in the pedicle at L5 on the  left.  A 35 mm titanium sulema was delivered into the heads of the screws through the Voyager screw extenders.  Set screws were placed into the screw heads and tightened in both screws and broken off and the screw extenders removed.  On the right side 6.5 x 35 mm screw was placed in the S1 pedicle and a 6.5 x 40 mm screw was placed in the L5 pedicle.  A 35 mm sulema was delivered into the heads of the screws through the Voyager screw extenders, set screws were placed into the screw heads and broken off, and the screw extenders were removed.      AP and lateral fluoroscopic images were taken to confirm the position of the interbody grafts and the pedicle screws.  The right iliac pin was removed and Marcaine was injected in the muscle at each incision site.  The incisions were closed with Vicryl for the subcutaneous and subcuticular layers and Skin Affix glue was applied to the skin.  The estimated blood loss was 100 mL.    César Peraza M.D

## 2019-02-08 NOTE — ANESTHESIA POSTPROCEDURE EVALUATION
Patient: Elisa Hugo    Procedure Summary     Date:  02/08/19 [facility] / [facility]   [facility]    Anesthesia Start:  1515 Anesthesia Stop:  1836    Procedure:  LUMBAR LAMINECTOMY POSTERIOR LUMBAR INTERBODY FUSION right L5/S1 (Right Spine Lumbar) Diagnosis:       Herniation of intervertebral disc of lumbar spine      (Herniation of intervertebral disc of lumbar spine [M51.26])    Surgeon:  César Peraza MD Provider:  Ryan Marvin MD    Anesthesia Type:  general ASA Status:  2          Anesthesia Type: general  Last vitals  BP   119/61 (02/08/19 1250)   Temp   98.9 °F (37.2 °C) (02/08/19 1250)   Pulse   81 (02/08/19 1250)   Resp   18 (02/08/19 1250)     SpO2   99 % (02/08/19 1250)     Post Anesthesia Care and Evaluation    Patient location during evaluation: PACU  Patient participation: complete - patient participated  Level of consciousness: awake and alert  Pain score: 0  Pain management: adequate  Airway patency: patent  Anesthetic complications: No anesthetic complications  PONV Status: none  Cardiovascular status: hemodynamically stable and acceptable  Respiratory status: nonlabored ventilation, acceptable and nasal cannula  Hydration status: acceptable

## 2019-02-08 NOTE — ANESTHESIA PROCEDURE NOTES
Airway  Urgency: elective    Airway not difficult    General Information and Staff    Patient location during procedure: OR    Indications and Patient Condition  Indications for airway management: airway protection    Preoxygenated: yes  Mask difficulty assessment: 1 - vent by mask    Final Airway Details  Final airway type: endotracheal airway      Successful airway: ETT  Cuffed: yes   Successful intubation technique: direct laryngoscopy  Facilitating devices/methods: intubating stylet  Endotracheal tube insertion site: oral  Blade: Baig  Blade size: 2  ETT size (mm): 7.0  Cormack-Lehane Classification: grade I - full view of glottis  Placement verified by: chest auscultation and capnometry   Measured from: lips  Number of attempts at approach: 1

## 2019-02-08 NOTE — BRIEF OP NOTE
LUMBAR LAMINECTOMY POSTERIOR LUMBAR INTERBODY FUSION  Progress Note    Elisa Hugo  2/8/2019    Pre-op Diagnosis:   Herniation of intervertebral disc of lumbar spine [M51.26]       Post-Op Diagnosis Codes:     * Herniation of intervertebral disc of lumbar spine [M51.26]    Procedure/CPT® Codes:      Procedure(s):  LUMBAR LAMINECTOMY POSTERIOR LUMBAR INTERBODY FUSION right L5/S1    Surgeon(s):  César Peraza MD    Anesthesia: General    Staff:   Circulator: Haase, Sherri L, RN; Tatyana Hernandez RN  Scrub Person: Clemencia Jeter; Chandan Pérez  Vendor Representative: Chris Galeana Joseph  Assistant: Irina Damon PA-C    Estimated Blood Loss: 100ml    Urine Voided: * No values recorded between 2/8/2019  3:14 PM and 2/8/2019  6:16 PM *    Specimens:                None      Drains:      Findings: Recurrent disc herniation L5-S1 right    Complications: None      César Peraza MD     Date: 2/8/2019  Time: 6:16 PM

## 2019-02-08 NOTE — H&P
"Pre-Op H&P  Elisa Hugo  [id number]  1985    Chief complaint: Low back pain into RLE with numbness and tingling    HPI:    1/3/19 office visit:  Elisa Hugo is a 33 y.o. female who presents for follow up of  right hip and leg pain.      The patient had a lumbar discectomy at L5-S1 on the right on 10/17/18, 2-1/2 months ago.  After 6 weeks she had recurrent pain to her right hip and leg that has become intractable and he has failed to respond to exercise, steroids, muscle relaxers, and recurring use of her therapy postop instructions.     Lumbar myelogram shows recurrent disc herniation L5-S1 on the right.    2/8/19:  Here today for LUMBAR LAMINECTOMY, POSTERIOR LUMBAR INTERBODY FUSION right L5/S1    Review of Systems:  General ROS: negative for chills, fever or skin lesions;  No changes since last office visit  Cardiovascular ROS: no chest pain or dyspnea on exertion  Respiratory ROS: no cough, shortness of breath, or wheezing    Allergies:   Allergies   Allergen Reactions   • Asa [Aspirin] GI Intolerance and Unknown (See Comments)     Gi upset    • Bean Pod Extract GI Intolerance     MILK AND SOY   • Diphth-Acell Pertussis-Tetanus Unknown (See Comments)     \" legs swelling\" happened when was an infant    • Penicillins GI Intolerance   • Soybean-Containing Drug Products GI Intolerance     All soy   • Strawberry Flavor GI Intolerance       Home Meds:    No current facility-administered medications on file prior to encounter.      Current Outpatient Medications on File Prior to Encounter   Medication Sig Dispense Refill   • cetirizine (zyrTEC) 10 MG tablet Take 10 mg by mouth Every Night.     • diphenhydrAMINE (BENADRYL) 25 mg capsule Take 25 mg by mouth Every 6 (Six) Hours As Needed for Itching.     • raNITIdine (ZANTAC) 150 MG tablet Take 150 mg by mouth At Night As Needed for Heartburn or Indigestion.     • VENTOLIN  (90 Base) MCG/ACT inhaler Inhale 2 puffs Every 4 (Four) Hours As Needed for " Wheezing.         PMH:   Past Medical History:   Diagnosis Date   • Asthma    • GERD (gastroesophageal reflux disease)    • Headache    • History of seizure as     • History of staph infection 2008    right leg after delivery of daughter; po meds only     • Hx of migraines    • Lower back pain    • Lumbar herniated disc    • MRSA infection     history of   • Stomach ulcer      PSH:    Past Surgical History:   Procedure Laterality Date   • ANTERIOR CERVICAL DISCECTOMY W/ FUSION Bilateral 2018    Procedure: CERVICAL DISCECTOMY ANTERIOR WITH FUSION C6-7  BILATERAL;  Surgeon: César Peraza MD;  Location:  [facility];  Service: Neurosurgery   • INTERVENTIONAL RADIOLOGY PROCEDURE N/A 2018    Procedure: myelogram cervical spine ;  Surgeon: Abdi Santana MD;  Location:  [facility];  Service:    • INTERVENTIONAL RADIOLOGY PROCEDURE N/A 2018    Procedure: myelogram lumbar spine;  Surgeon: Abdi Santana MD;  Location:  [facility];  Service: Interventional Radiology   • INTERVENTIONAL RADIOLOGY PROCEDURE N/A 2018    Procedure: IR myelogram lumbar spine;  Surgeon: Abdi Santana MD;  Location:  [facility];  Service: Interventional Radiology   • LUMBAR DISCECTOMY Right 10/17/2018    Procedure: LUMBAR DISCECTOMY L5-S1 RIGHT, MINIMALLY INVASIVE;  Surgeon: César Peraza MD;  Location:  [facility];  Service: Neurosurgery   • SINUS SURGERY         Social History:   Tobacco:   Social History     Tobacco Use   Smoking Status Current Every Day Smoker   • Packs/day: 0.50   • Years: 10.00   • Pack years: 5.00   Smokeless Tobacco Never Used      Alcohol:     Social History     Substance and Sexual Activity   Alcohol Use No       Vitals:           There were no vitals taken for this visit.    Physical Exam:  General Appearance:    Alert, cooperative, no distress, appears stated age   Head:    Normocephalic, without obvious abnormality, atraumatic   Lungs:      Clear to auscultation bilaterally, respirations unlabored    Heart:   Regular rate and rhythm, S1 and S2 normal, no murmur, rub    or gallop    Abdomen:    Soft, non-tender.    Breast Exam:    deferred   Genitalia:    deferred   Extremities:   Extremities normal, atraumatic, no cyanosis or edema   Skin:   Skin color, texture, turgor normal, no rashes or lesions   Neurologic:   Grossly intact   Results Review  I reviewed the patient's new clinical results.    Cancer Staging (if applicable)  Cancer Patient: __ yes _x_no __unknown; If yes, clinical stage T:__ N:__M:__, stage group or __N/A      Assessment:  Early recurrent disc herniation L5-S1 right with progressively intractable sciatica.       Plan      Recommend surgical discectomy L5-S1 on the right with inclusion of interbody fusion pedicle screw fixation because of the recurrence so early after the original surgery.      Keira Alcantar, APRN   2/8/2019   12:31 PM

## 2019-02-09 ENCOUNTER — APPOINTMENT (OUTPATIENT)
Dept: GENERAL RADIOLOGY | Facility: HOSPITAL | Age: 34
End: 2019-02-09

## 2019-02-09 LAB
HCT VFR BLD AUTO: 35.2 % (ref 34.5–44)
HGB BLD-MCNC: 11.8 G/DL (ref 11.5–15.5)

## 2019-02-09 PROCEDURE — 25010000003 CEFAZOLIN IN DEXTROSE 2-4 GM/100ML-% SOLUTION: Performed by: NEUROLOGICAL SURGERY

## 2019-02-09 PROCEDURE — 97116 GAIT TRAINING THERAPY: CPT

## 2019-02-09 PROCEDURE — 72100 X-RAY EXAM L-S SPINE 2/3 VWS: CPT

## 2019-02-09 PROCEDURE — 85018 HEMOGLOBIN: CPT | Performed by: NEUROLOGICAL SURGERY

## 2019-02-09 PROCEDURE — 97161 PT EVAL LOW COMPLEX 20 MIN: CPT

## 2019-02-09 PROCEDURE — 97535 SELF CARE MNGMENT TRAINING: CPT

## 2019-02-09 PROCEDURE — 99024 POSTOP FOLLOW-UP VISIT: CPT | Performed by: NEUROLOGICAL SURGERY

## 2019-02-09 PROCEDURE — 97165 OT EVAL LOW COMPLEX 30 MIN: CPT

## 2019-02-09 PROCEDURE — 97110 THERAPEUTIC EXERCISES: CPT

## 2019-02-09 PROCEDURE — 85014 HEMATOCRIT: CPT | Performed by: NEUROLOGICAL SURGERY

## 2019-02-09 RX ADMIN — OXYCODONE HYDROCHLORIDE AND ACETAMINOPHEN 1 TABLET: 7.5; 325 TABLET ORAL at 05:24

## 2019-02-09 RX ADMIN — FAMOTIDINE 20 MG: 20 TABLET, FILM COATED ORAL at 08:52

## 2019-02-09 RX ADMIN — CETIRIZINE HYDROCHLORIDE 10 MG: 10 TABLET, FILM COATED ORAL at 20:03

## 2019-02-09 RX ADMIN — SODIUM CHLORIDE, POTASSIUM CHLORIDE, SODIUM LACTATE AND CALCIUM CHLORIDE 75 ML/HR: 600; 310; 30; 20 INJECTION, SOLUTION INTRAVENOUS at 20:03

## 2019-02-09 RX ADMIN — TEMAZEPAM 15 MG: 15 CAPSULE ORAL at 22:10

## 2019-02-09 RX ADMIN — OXYCODONE HYDROCHLORIDE AND ACETAMINOPHEN 1 TABLET: 7.5; 325 TABLET ORAL at 01:09

## 2019-02-09 RX ADMIN — SENNOSIDES AND DOCUSATE SODIUM 1 TABLET: 8.6; 5 TABLET ORAL at 20:03

## 2019-02-09 RX ADMIN — BISACODYL 5 MG: 5 TABLET, COATED ORAL at 20:03

## 2019-02-09 RX ADMIN — METHOCARBAMOL TABLETS 1000 MG: 500 TABLET, COATED ORAL at 20:03

## 2019-02-09 RX ADMIN — POLYETHYLENE GLYCOL 3350 17 G: 17 POWDER, FOR SOLUTION ORAL at 20:03

## 2019-02-09 RX ADMIN — CEFAZOLIN SODIUM 2 G: 2 INJECTION, SOLUTION INTRAVENOUS at 08:52

## 2019-02-09 NOTE — PROGRESS NOTES
Continued Stay Note   [facility]     Patient Name: Elisa Hugo  MRN: [id number]  Today's Date: 2/9/2019    Admit Date: 2/8/2019    Discharge Plan     Row Name 02/09/19 1154       Plan    Plan  Home    Patient/Family in Agreement with Plan  yes    Plan Comments  CM consulted regarding needs at discharge. Per MD note, patient will be here through Monday. Met with patient in the room, and she does not anticipate any needs at this time. She lives with her parents and daughter who can assist her at home. OT has recommended home with family. PT eval is still pending. CM will continue to follow.     Final Discharge Disposition Code  01 - home or self-care        Discharge Codes    No documentation.             Riya Le

## 2019-02-09 NOTE — PLAN OF CARE
Problem: Patient Care Overview  Goal: Plan of Care Review  Outcome: Ongoing (interventions implemented as appropriate)   02/09/19 1272   Coping/Psychosocial   Plan of Care Reviewed With patient   OTHER   Outcome Summary OT IE completed. Pt motivated to regain functional independence. CGA all mobility and transfers with good safety RW level. AE issued; mod A LB dressing tasks using AE. CGA for balance only for toileting tasks. OT to follow. Recommend home with family support at d/c.

## 2019-02-09 NOTE — THERAPY EVALUATION
Acute Care - Occupational Therapy Initial Evaluation   [facility]     Patient Name: Elisa Hugo  : 1985  MRN: [id number]  Today's Date: 2019  Onset of Illness/Injury or Date of Surgery: 19  Date of Referral to OT: 19  Referring Physician: MD Stu    Admit Date: 2019       ICD-10-CM ICD-9-CM   1. Impaired functional mobility, balance, gait, and endurance Z74.09 V49.89   2. Impaired mobility and ADLs Z74.09 799.89     Patient Active Problem List   Diagnosis   • Status post cervical spinal fusion   • Neck pain   • Gastro-esophageal reflux disease without esophagitis   • Intervertebral disc disorder   • Sciatica   • Sleep disorder   • Sciatica of right side   • S/P lumbar discectomy L5-S1 RIGHT on 10/17/18   • Herniation of intervertebral disc of lumbar spine     Past Medical History:   Diagnosis Date   • Asthma    • GERD (gastroesophageal reflux disease)    • Headache    • History of seizure as     • History of staph infection 2008    right leg after delivery of daughter; po meds only     • Hx of migraines    • Lower back pain    • Lumbar herniated disc    • MRSA infection     history of   • Stomach ulcer      Past Surgical History:   Procedure Laterality Date   • ANTERIOR CERVICAL DISCECTOMY W/ FUSION Bilateral 2018    Procedure: CERVICAL DISCECTOMY ANTERIOR WITH FUSION C6-7  BILATERAL;  Surgeon: Céasr Peraza MD;  Location: [facility] OR;  Service: Neurosurgery   • INTERVENTIONAL RADIOLOGY PROCEDURE N/A 2018    Procedure: myelogram cervical spine ;  Surgeon: Abdi Santana MD;  Location:  [facility];  Service:    • INTERVENTIONAL RADIOLOGY PROCEDURE N/A 2018    Procedure: myelogram lumbar spine;  Surgeon: Abdi Santana MD;  Location:  [facility];  Service: Interventional Radiology   • INTERVENTIONAL RADIOLOGY PROCEDURE N/A 2018    Procedure: IR myelogram lumbar spine;  Surgeon: Abdi Santana MD;  Location:  [facility];  Service: Interventional Radiology   • LUMBAR DISCECTOMY Right 10/17/2018    Procedure: LUMBAR DISCECTOMY L5-S1 RIGHT, MINIMALLY INVASIVE;  Surgeon: César Peraza MD;  Location: [facility] OR;  Service: Neurosurgery   • SINUS SURGERY            OT ASSESSMENT FLOWSHEET (last 72 hours)      Occupational Therapy Evaluation     Row Name 02/09/19 0943                   OT Evaluation Time/Intention    Subjective Information  complains of;weakness;pain  -TB        Document Type  evaluation  -TB        Mode of Treatment  occupational therapy  -TB        Patient Effort  good  -TB        Symptoms Noted During/After Treatment  increased pain  -TB           General Information    Patient Profile Reviewed?  yes  -TB        Onset of Illness/Injury or Date of Surgery  02/08/19  -TB        Referring Physician  Stu  -TB        Patient Observations  alert;cooperative  -TB        Patient/Family Observations  No family present  -TB        General Observations of Patient  Pt in bed, room air, IV, PCA; exit alarm  -TB        Prior Level of Function  min assist:;ADL's  -TB        Equipment Currently Used at Home  shower chair  -TB        Pertinent History of Current Functional Problem  Pt admitted for surgical mgmt of recurrent disc herniation; s/p L5-S1 TLIF with pedicale screw fixation   -TB        Existing Precautions/Restrictions  fall;spinal  -TB        Limitations/Impairments  -- None  -TB        Equipment Issued to Patient  bathing equipment;dressing equipment  -TB        Risks Reviewed  patient:;LOB;increased discomfort;increased drainage;lines disloged  -TB        Benefits Reviewed  patient:;improve function;decrease pain;decrease risk of DVT;increase knowledge  -TB        Barriers to Rehab  previous functional deficit  -TB           Relationship/Environment    Primary Source of Support/Comfort  parent;child(larisa)  -TB        Lives With  child(larisa), dependent;parent(s)  -TB        Concerns About Impact on  Relationships  Pt lives with her father and 11y/o daughter; currently unemployed d/t over extended FMLA  -TB           Resource/Environmental Concerns    Current Living Arrangements  home/apartment/condo  -TB           Home Main Entrance    Number of Stairs, Main Entrance  two  -TB           Cognitive Assessment/Intervention- PT/OT    Orientation Status (Cognition)  oriented x 4  -TB        Follows Commands (Cognition)  WFL  -TB        Safety Deficit (Cognitive)  -- good safety  -TB           Safety Issues, Functional Mobility    Safety Issues Affecting Function (Mobility)  -- good safety  -TB           Bed Mobility Assessment/Treatment    Bed Mobility Assessment/Treatment  rolling left;sidelying-sit;sit-sidelying  -TB        Rolling Left [place] (Bed Mobility)  contact guard;verbal cues  -TB        Comment (Bed Mobility)  Education reinforced for spinal precautions and logroll sequencing  -TB           Functional Mobility    Functional Mobility- Ind. Level  contact guard assist  -TB        Functional Mobility- Device  rolling walker  -TB        Functional Mobility-Distance (Feet)  30  -TB        Functional Mobility- Comment  to BR, hallway  -TB           Transfer Assessment/Treatment    Transfer Assessment/Treatment  sit-stand transfer;stand-sit transfer;toilet transfer  -TB        Comment (Transfers)  cues for hand placement, sequencing  -TB           Sit-Stand Transfer    Sit-Stand [place] (Transfers)  contact guard;verbal cues  -TB        Assistive Device (Sit-Stand Transfers)  walker, front-wheeled  -TB           Stand-Sit Transfer    Stand-Sit [place] (Transfers)  contact guard;verbal cues  -TB        Assistive Device (Stand-Sit Transfers)  walker, front-wheeled  -TB           Toilet Transfer    Type (Toilet Transfer)  sit-stand;stand-sit  -TB        [place] Level (Toilet Transfer)  contact guard;verbal cues  -TB        Assistive Device (Toilet Transfer)  grab bars/safety frame;raised  toilet seat  -TB           ADL Assessment/Intervention    67906 - OT Self Care/Mgmt Minutes  15  -TB        BADL Assessment/Intervention  bathing;lower body dressing;toileting  -TB           Bathing Assessment/Intervention    Bathing [place] Level  lower body;set up  -TB        Assistive Devices (Bathing)  long-handled sponge  -TB        Bathing Position  edge of bed sitting  -TB        Comment (Bathing)  pt simulates LB bathing; AE issued and teaching completed  -TB           Lower Body Dressing Assessment/Training    Lower Body Dressing [place] Level  doff;don;socks;undergarment;shoes/slippers;set up;verbal cues  -TB        Assistive Devices (Lower Body Dressing)  long-handled shoe horn;reacher;sock-aid elastic laces  -TB        Lower Body Dressing Position  edge of bed sitting  -TB        Comment (Lower Body Dressing)  Education reinforced for spinal precautions with ADLs; AE issued and teaching completed with good teach back  -TB           Toileting Assessment/Training    [place] Level (Toileting)  toileting skills;contact guard assist  -TB        Assistive Devices (Toileting)  grab bar/safety frame;raised toilet seat  -TB        Toileting Position  supported standing  -TB        Comment (Toileting)  assist to steading while pt managing clothing. Independent for hygiene needs.  -TB           BADL Safety/Performance    Impairments, BADL Safety/Performance  pain;strength;range of motion;balance  -TB        Skilled BADL Treatment/Intervention  BADL process/adaptation training;adaptive equipment training  -TB           General ROM    GENERAL ROM COMMENTS  B Shoulder FE/ABduction deficits following cervical sx  -TB           MMT (Manual Muscle Testing)    General MMT Comments  B Shoulders 3-/5, B  4-/5  -TB           Motor Assessment/Interventions    Additional Documentation  Balance (Group);Therapeutic Exercise (Group)  -TB           Therapeutic Exercise    Therapeutic Exercise  supine, upper  extremities  -TB        Additional Documentation  Therapeutic Exercise (Row)  -TB           Upper Extremity Supine Therapeutic Exercise    Performed, Supine Upper Extremity (Therapeutic Exercise)  shoulder flexion/extension;shoulder abduction/adduction;shoulder horizontal abduction/adduction;elbow flexion/extension  -TB        Exercise Type, Supine Upper Extremity (Therapeutic Exercise)  AROM (active range of motion)  -TB        Restrictions, Supine Upper Extremity (Therapeutic Exercise)  pain/weakness limit B shoulder FE/ABduction since cervical repair  -TB        Comment, Supine Upper Extremity (Therapeutic Exercise)  Education reinforced for benefits of OOB activity/therapy, role of OT, spinal precautions, use of AE and transfer training.  -TB           Balance    Balance  dynamic sitting balance;dynamic standing balance  -TB           Dynamic Sitting Balance    Level of [place], Reaches Outside Midline (Sitting, Dynamic Balance)  standby assist  -TB        Sitting Position, Reaches Outside Midline (Sitting, Dynamic Balance)  sitting on edge of bed commode  -TB        Comment, Reaches Outside Midline (Sitting, Dynamic Balance)  ADL tasks, AE teaching  -TB           Dynamic Standing Balance    Level of [place], Reaches Outside Midline (Standing, Dynamic Balance)  contact guard assist  -TB        Time Able to Maintain Position, Reaches Outside Midline (Standing, Dynamic Balance)  more than 5 minutes  -TB        Assistive Device Utilized (Supported Standing, Dynamic Balance)  walker, rolling  -TB        Comment, Reaches Outside Midline (Standing, Dynamic Balance)  ADL tasks  -TB           Sensory Assessment/Intervention    Sensory General Assessment  no sensation deficits identified Pt reports intermittent numbness B hands  -TB           Positioning and Restraints    Pre-Treatment Position  in bed  -TB        Post Treatment Position  bed  -TB        In Bed  side lying left;with PT  -TB           Pain  "Assessment    Additional Documentation  Pain Scale: Numbers Pre/Post-Treatment (Group)  -TB           Pain Scale: Numbers Pre/Post-Treatment    Pain Scale: Numbers, Pretreatment  8/10  -TB        Pain Scale: Numbers, Post-Treatment  10/10 \"13-15\"  -TB        Pain Location - Orientation  lower  -TB        Pain Location  back  -TB        Pre/Post Treatment Pain Comment  Pt tolerates OOB activity  -TB        Pain Intervention(s)  Ambulation/increased activity;Repositioned;Medication (See MAR) PCA  -TB           Wound 02/08/19 1808 Right back incision    Wound - Properties Group Date first assessed: 02/08/19  -EH Time first assessed: 1808  -EH Side: Right  -EH Location: back  -EH Type: incision  -EH       Coping    Observed Emotional State  calm;cooperative  -TB        Verbalized Emotional State  acceptance  -TB           Plan of Care Review    Plan of Care Reviewed With  patient  -TB           Clinical Impression (OT)    Date of Referral to OT  02/08/19  -TB        OT Diagnosis  Impaired mobility and ADL  -TB        Patient/Family Goals Statement (OT Eval)  to resolve pain and regain functional independence  -TB        Criteria for Skilled Therapeutic Interventions Met (OT Eval)  yes;treatment indicated  -TB        Rehab Potential (OT Eval)  good, to achieve stated therapy goals  -TB        Therapy Frequency (OT Eval)  daily  -TB        Care Plan Review (OT)  evaluation/treatment results reviewed;care plan/treatment goals reviewed;patient/other agree to care plan  -TB        Anticipated Equipment Needs at Discharge (OT)  -- None  -TB        Anticipated Discharge Disposition (OT)  home with assist  -TB           Vital Signs    Pre Systolic BP Rehab  -- RN cleared OT  -TB        O2 Delivery Post Treatment  room air  -TB        Pre Patient Position  Supine  -TB        Intra Patient Position  Standing  -TB        Post Patient Position  Side Lying  -TB           Planned OT Interventions    Planned Therapy Interventions (OT " Eval)  transfer/mobility retraining;occupation/activity based interventions;BADL retraining;adaptive equipment training  -TB           OT Goals    Bed Mobility Goal Selection (OT)  bed mobility, OT goal 1  -TB        Transfer Goal Selection (OT)  transfer, OT goal 1  -TB        Dressing Goal Selection (OT)  dressing, OT goal 1  -TB        Grooming Goal Selection (OT)  --  -TB           Bed Mobility Goal 1 (OT)    Activity/Assistive Device (Bed Mobility Goal 1, OT)  sidelying to sit/sit to sidelying  -TB        [place] Level/Cues Needed (Bed Mobility Goal 1, OT)  supervision required;verbal cues required  -TB        Time Frame (Bed Mobility Goal 1, OT)  by discharge  -TB        Barriers (Bed Mobility Goal 1, OT)  spinal precautions  -TB        Progress/Outcomes (Bed Mobility Goal 1, OT)  goal ongoing  -TB           Transfer Goal 1 (OT)    Activity/Assistive Device (Transfer Goal 1, OT)  tub;walker, rolling stepping in/out of practice tub  -TB        [place] Level/Cues Needed (Transfer Goal 1, OT)  minimum assist (75% or more patient effort);verbal cues required  -TB        Time Frame (Transfer Goal 1, OT)  by discharge  -TB        Barriers (Transfers Goal 1, OT)  pain, spinal precautions  -TB        Progress/Outcome (Transfer Goal 1, OT)  goal ongoing  -TB           Dressing Goal 1 (OT)    Activity/Assistive Device (Dressing Goal 1, OT)  lower body dressing;reacher;sock-aid;long handled shoe horn  -TB        [place]/Cues Needed (Dressing Goal 1, OT)  minimum assist (75% or more patient effort);verbal cues required  -TB        Time Frame (Dressing Goal 1, OT)  by discharge  -TB        Barriers (Dressing Goal 1, OT)  pain, spinal precautions  -TB        Progress/Outcome (Dressing Goal 1, OT)  goal ongoing  -TB           Living Environment    Home Accessibility  not wheelchair accessible;stairs to enter home;tub/shower is not walk in  -TB          User Key  (r) = Recorded By, (t) = Taken By, (c) =  Cosigned By    Initials Name Effective Dates     Leana Gonsalves OT 06/08/18 -     Tatyana Leach RN 08/17/17 -          Occupational Therapy Education     Title: PT OT SLP Therapies (Done)     Topic: Occupational Therapy (Done)     Point: ADL training (Done)     Description: Instruct learner(s) on proper safety adaptation and remediation techniques during self care or transfers.   Instruct in proper use of assistive devices.    Learning Progress Summary           Patient Acceptance, E,D,TB, VU,DU,NR by  at 2/9/2019 10:43 AM    Comment:  Education reinforced for benefits of OOB activity/therapy, role of OT, spinal precautions and use of AE                   Point: Precautions (Done)     Description: Instruct learner(s) on prescribed precautions during self-care and functional transfers.    Learning Progress Summary           Patient Acceptance, E,D,TB, VU,DU,NR by  at 2/9/2019 10:43 AM    Comment:  Education reinforced for benefits of OOB activity/therapy, role of OT, spinal precautions and use of AE                               User Key     Initials Effective Dates Name Provider Type Discipline     06/08/18 -  Leana Gonsalves OT Occupational Therapist OT                  OT Recommendation and Plan  Outcome Summary/Treatment Plan (OT)  Anticipated Equipment Needs at Discharge (OT): (None)  Anticipated Discharge Disposition (OT): home with assist  Planned Therapy Interventions (OT Eval): transfer/mobility retraining, occupation/activity based interventions, BADL retraining, adaptive equipment training  Therapy Frequency (OT Eval): daily  Plan of Care Review  Plan of Care Reviewed With: patient  Plan of Care Reviewed With: patient  Outcome Summary: OT IE completed. Pt motivated to regain functional independence. CGA all mobility and transfers with good safety RW level. AE issued; mod A LB dressing tasks using AE. CGA for balance only for toileting tasks. OT to follow. Recommend home with  family support at d/c.     Outcome Measures     Row Name 02/09/19 0943             How much help from another is currently needed...    Putting on and taking off regular lower body clothing?  2  -TB      Bathing (including washing, rinsing, and drying)  3  -TB      Toileting (which includes using toilet bed pan or urinal)  3  -TB      Putting on and taking off regular upper body clothing  3  -TB      Taking care of personal grooming (such as brushing teeth)  3  -TB      Eating meals  4  -TB      Score  18  -TB         Functional Assessment    Outcome Measure Options  AM-PAC 6 Clicks Daily Activity (OT)  -TB        User Key  (r) = Recorded By, (t) = Taken By, (c) = Cosigned By    Initials Name Provider Type    TB Leana Gonsalves OT Occupational Therapist          Time Calculation:   Time Calculation- OT     Row Name 02/09/19 1050 02/09/19 0943          Time Calculation- OT    OT Start Time  0943  -TB  --     OT Received On  02/09/19  -TB  --     OT Goal Re-Cert Due Date  02/19/19  -TB  --        Timed Charges    28609 - OT Self Care/Mgmt Minutes  --  15  -TB       User Key  (r) = Recorded By, (t) = Taken By, (c) = Cosigned By    Initials Name Provider Type    TB Leana Gonsalves OT Occupational Therapist        Therapy Suggested Charges     Code   Minutes Charges    42119 (CPT®) Hc Ot Neuromusc Re Education Ea 15 Min      56583 (CPT®) Hc Ot Ther Proc Ea 15 Min      99431 (CPT®) Hc Ot Therapeutic Act Ea 15 Min      01705 (CPT®) Hc Ot Manual Therapy Ea 15 Min      38513 (CPT®) Hc Ot Iontophoresis Ea 15 Min      74470 (CPT®) Hc Ot Elec Stim Ea-Per 15 Min      59003 (CPT®) Hc Ot Ultrasound Ea 15 Min      52522 (CPT®) Hc Ot Self Care/Mgmt/Train Ea 15 Min 15 1    Total  15 1        Therapy Charges for Today     Code Description Service Date Service Provider Modifiers Qty    25104969619 HC OT SELF CARE/MGMT/TRAIN EA 15 MIN 2/9/2019 Leana Gonsalves OT GO 1    [id number] HC OT EVAL LOW COMPLEXITY 3  2/9/2019 Leana Gonsalves, OT GO 1               Leana Gonsalves, OT  2/9/2019

## 2019-02-09 NOTE — THERAPY EVALUATION
Acute Care - Physical Therapy Initial Evaluation  [facility]     Patient Name: Elisa Hugo  : 1985  MRN: [id number]  Today's Date: 2019   Onset of Illness/Injury or Date of Surgery: 19  Date of Referral to PT: 19  Referring Physician: MD Stu      Admit Date: 2019    Visit Dx:     ICD-10-CM ICD-9-CM   1. Impaired functional mobility, balance, gait, and endurance Z74.09 V49.89   2. Impaired mobility and ADLs Z74.09 799.89     Patient Active Problem List   Diagnosis   • Status post cervical spinal fusion   • Neck pain   • Gastro-esophageal reflux disease without esophagitis   • Intervertebral disc disorder   • Sciatica   • Sleep disorder   • Sciatica of right side   • S/P lumbar discectomy L5-S1 RIGHT on 10/17/18   • Herniation of intervertebral disc of lumbar spine     Past Medical History:   Diagnosis Date   • Asthma    • GERD (gastroesophageal reflux disease)    • Headache    • History of seizure as     • History of staph infection 2008    right leg after delivery of daughter; po meds only     • Hx of migraines    • Lower back pain    • Lumbar herniated disc    • MRSA infection     history of   • Stomach ulcer      Past Surgical History:   Procedure Laterality Date   • ANTERIOR CERVICAL DISCECTOMY W/ FUSION Bilateral 2018    Procedure: CERVICAL DISCECTOMY ANTERIOR WITH FUSION C6-7  BILATERAL;  Surgeon: César Peraza MD;  Location: [facility] OR;  Service: Neurosurgery   • INTERVENTIONAL RADIOLOGY PROCEDURE N/A 2018    Procedure: myelogram cervical spine ;  Surgeon: Abdi Santana MD;  Location: [facility] CATH INVASIVE LOCATION;  Service:    • INTERVENTIONAL RADIOLOGY PROCEDURE N/A 2018    Procedure: myelogram lumbar spine;  Surgeon: Abdi Santana MD;  Location:  [facility] CATH INVASIVE LOCATION;  Service: Interventional Radiology   • INTERVENTIONAL RADIOLOGY PROCEDURE N/A 2018    Procedure: IR myelogram lumbar spine;  Surgeon: Abdi Santana MD;  Location:   [facility] CATH INVASIVE LOCATION;  Service: Interventional Radiology   • LUMBAR DISCECTOMY Right 10/17/2018    Procedure: LUMBAR DISCECTOMY L5-S1 RIGHT, MINIMALLY INVASIVE;  Surgeon: César Peraza MD;  Location: [facility] OR;  Service: Neurosurgery   • SINUS SURGERY          PT ASSESSMENT (last 12 hours)      Physical Therapy Evaluation     Row Name 02/09/19 0944          PT Evaluation Time/Intention    Subjective Information  complains of;pain;numbness LBP, down R LE, numbness R foot  -LR     Document Type  evaluation  -LR     Mode of Treatment  physical therapy;individual therapy  -LR     Patient Effort  excellent  -LR     Symptoms Noted During/After Treatment  increased pain  -LR     Comment  Notified RN.  -LR     Row Name 02/09/19 0944          General Information    Patient Profile Reviewed?  yes  -LR     Onset of Illness/Injury or Date of Surgery  02/09/19  -LR     Referring Physician  MD Stu  -LR     Patient Observations  alert;cooperative;agree to therapy  -LR     General Observations of Patient  Patient supine in bed upon arrival. IV, PCA, exit alarm.   -LR     Prior Level of Function  min assist:;all household mobility;transfer;gait;bed mobility;ADL's;home management;cooking;cleaning;using stairs;independent:;driving;dependent:;community mobility;shopping limited home management, scooter at store,no long distances  -LR     Equipment Currently Used at Home  shower chair  -LR     Pertinent History of Current Functional Problem  Patient presents for surgical management of recurrent R hip/leg pain 6 months s/p R discectomy at L5-S1 that has failed to improve with conservative management.   -LR     Existing Precautions/Restrictions  fall;spinal  -LR     Limitations/Impairments  -- none  -LR     Equipment Issued to Patient  -- none  -LR     Equipment Ordered for Patient  -- none  -LR     Risks Reviewed  patient:;LOB;nausea/vomiting;dizziness;increased discomfort;lines disloged  -LR     Benefits Reviewed   patient:;improve function;increase independence;increase strength;increase balance;decrease pain;decrease risk of DVT;increase knowledge  -LR     Barriers to Rehab  previous functional deficit  -LR     Row Name 02/09/19 0944          Relationship/Environment    Primary Source of Support/Comfort  parent;child(larisa) dad available to assist at all times, 10 YO daughter  -LR     Lives With  child(larisa), dependent;parent(s)  -LR     Row Name 02/09/19 0944          Resource/Environmental Concerns    Current Living Arrangements  home/apartment/condo  -LR     Resource/Environmental Concerns  none  -LR     Transportation Concerns  car, none  -LR     Row Name 02/09/19 0944          Home Main Entrance    Number of Stairs, Main Entrance  two  -LR     Stair Railings, Main Entrance  none  -LR     Stairs Comment, Main Entrance  2 separate steps  -LR     Row Name 02/09/19 0944          Cognitive Assessment/Intervention- PT/OT    Orientation Status (Cognition)  oriented x 4  -LR     Follows Commands (Cognition)  WFL;follows one step commands;over 90% accuracy;verbal cues/prompting required;repetition of directions required  -LR     Safety Deficit (Cognitive)  other (see comments) none  -LR     Row Name 02/09/19 0944          Safety Issues, Functional Mobility    Safety Issues Affecting Function (Mobility)  other (see comments) none  -LR     Row Name 02/09/19 0944          Bed Mobility Assessment/Treatment    Bed Mobility Assessment/Treatment  supine-sit;sit-supine  -LR     Supine-Sit [place] (Bed Mobility)  verbal cues;contact guard  -LR     Sit-Supine [place] (Bed Mobility)  verbal cues;contact guard  -LR     Bed Mobility, Safety Issues  decreased use of legs for bridging/pushing  -LR     Assistive Device (Bed Mobility)  head of bed elevated;bed rails  -LR     Comment (Bed Mobility)  Verbal cues to flex knees and roll hips and shoulders at same time onto L side. Cues to then drop LEs off EOB and to push up from bed to raise  trunk into sitting. Verbal cues to lay back down on L side and to roll hips and shoulders into supine at same time.   -LR     Row Name 02/09/19 0944          Transfer Assessment/Treatment    Transfer Assessment/Treatment  sit-stand transfer;stand-sit transfer  -LR     Comment (Transfers)  Verbal cues to push up from bed to stand and to reach back for bed to lower into sitting. Verbal cues to limit trunk flexion during t/f.   -LR     Sit-Stand [place] (Transfers)  verbal cues;contact guard  -LR     Stand-Sit [place] (Transfers)  verbal cues;contact guard  -LR     Row Name 02/09/19 0944          Sit-Stand Transfer    Assistive Device (Sit-Stand Transfers)  walker, front-wheeled  -LR     Row Name 02/09/19 0944          Stand-Sit Transfer    Assistive Device (Stand-Sit Transfers)  walker, front-wheeled  -LR     Row Name 02/09/19 0944          Gait/Stairs Assessment/Training    62431 - Gait Training Minutes   13  -LR     [place] Level (Gait)  verbal cues;contact guard  -LR     Assistive Device (Gait)  walker, front-wheeled  -LR     Distance in Feet (Gait)  160  -LR     Pattern (Gait)  step-through  -LR     Deviations/Abnormal Patterns (Gait)  bilateral deviations;ranjan decreased;gait speed decreased;stride length decreased  -LR     Bilateral Gait Deviations  forward flexed posture;heel strike decreased  -LR     Comment (Gait/Stairs)  Patient ambulated with step through gait pattern at slow pace. Verbal cues for increased LE weight bearing, decreased UE weight bearing, and upright posture. Improved with cues for correction. Gait limited by pain.   -LR     Row Name 02/09/19 0944          General ROM    RT Lower Ext  Comment  -LR     LT Lower Ext  Comment  -LR     Row Name 02/09/19 0944          Right Lower Ext    RT Lower Extremity Comments  R LE AROM impaired 25% d/t weakness/pain  -LR     Row Name 02/09/19 0944          Left Lower Ext    LT Lower Extremity Comments  L LE AROM WFL  -LR     Row Name  02/09/19 0944          MMT (Manual Muscle Testing)    Rt Lower Ext  Comments  -LR     Lt Lower Ext  Comments  -LR     Row Name 02/09/19 0944          MMT Right Lower Ext    Rt Lower Extremity Comments   R LE functioanlly 4-/5  -LR     Row Name 02/09/19 0944          MMT Left Lower Ext    Lt Lower Extremity Comments   L LE functionally 4+/5  -LR     Row Name 02/09/19 0944          Motor Assessment/Intervention    Additional Documentation  Therapeutic Exercise (Group);Therapeutic Exercise Interventions (Group)  -LR     Row Name 02/09/19 0944          Therapeutic Exercise    68499 - PT Therapeutic Exercise Minutes  10  -LR     Row Name 02/09/19 0944          Therapeutic Exercise    Lower Extremity (Therapeutic Exercise)  gluteal sets;heel slides, bilateral;quad sets, bilateral  -LR     Lower Extremity Range of Motion (Therapeutic Exercise)  hip internal/external rotation, bilateral;ankle dorsiflexion/plantar flexion, bilateral  -LR     Core Strength (Therapeutic Exercise)  abdominal bracing;other (see comments) ab sets, BKFO, arms overhead  -LR     Exercise Type (Therapeutic Exercise)  AROM (active range of motion);isometric contraction, static;isotonic contraction, concentric;AAROM (active assistive range of motion)  -LR     Position (Therapeutic Exercise)  supine  -LR     Sets/Reps (Therapeutic Exercise)  x10 reps each  -LR     Comment (Therapeutic Exercise)  cues for technique; min assist heel slides  -LR     Row Name 02/09/19 0944          Sensory Assessment/Intervention    Sensory General Assessment  no sensation deficits identified;other (see comments) c/o numbness R foot  -LR     Row Name 02/09/19 0944          Vision Assessment/Intervention    Visual Impairment/Limitations  WFL  -LR     Row Name 02/09/19 0944          Pain Assessment    Additional Documentation  Pain Scale: Numbers Pre/Post-Treatment (Group)  -LR     Row Name 02/09/19 0944          Pain Scale: Numbers Pre/Post-Treatment    Pain Scale: Numbers,  Pretreatment  9/10  -LR     Pain Scale: Numbers, Post-Treatment  10/10  -LR     Pain Location - Orientation  lower  -LR     Pain Location  back  -LR     Pre/Post Treatment Pain Comment  down R LE  -LR     Pain Intervention(s)  Repositioned;Ambulation/increased activity  -LR     Row Name             Wound 02/08/19 1808 Right back incision    Wound - Properties Group Date first assessed: 02/08/19  -EH Time first assessed: 1808  -EH Side: Right  -EH Location: back  -EH Type: incision  -EH    Row Name 02/09/19 0944          Coping    Observed Emotional State  accepting;cooperative  -LR     Verbalized Emotional State  acceptance  -LR     Row Name 02/09/19 0944          Plan of Care Review    Plan of Care Reviewed With  patient  -LR     Row Name 02/09/19 0944          Physical Therapy Clinical Impression    Date of Referral to PT  02/09/19  -LR     PT Diagnosis (PT Clinical Impression)  recurrent disc herniation L5-S1 transforaminal PLIF with PSF  -LR     Prognosis (PT Clinical Impression)  good  -LR     Patient/Family Goals Statement (PT Clinical Impression)  go home, decrease pain  -LR     Criteria for Skilled Interventions Met (PT Clinical Impression)  yes;treatment indicated  -LR     Rehab Potential (PT Clinical Summary)  good, to achieve stated therapy goals  -LR     Care Plan Review (PT)  evaluation/treatment results reviewed;care plan/treatment goals reviewed;risks/benefits reviewed;current/potential barriers reviewed;patient/other agree to care plan  -LR     Row Name 02/09/19 0944          Physical Therapy Goals    Bed Mobility Goal Selection (PT)  bed mobility, PT goal 1  -LR     Transfer Goal Selection (PT)  transfer, PT goal 1  -LR     Gait Training Goal Selection (PT)  gait training, PT goal 1  -LR     Stairs Goal Selection (PT)  stairs, PT goal 1  -LR     Additional Documentation  Stairs Goal Selection (PT) (Row)  -LR     Row Name 02/09/19 0944          Bed Mobility Goal 1 (PT)    Activity/Assistive Device  (Bed Mobility Goal 1, PT)  sit to supine/supine to sit  -LR     [place] Level/Cues Needed (Bed Mobility Goal 1, PT)  conditional independence  -LR     Time Frame (Bed Mobility Goal 1, PT)  long term goal (LTG);5 days  -LR     Progress/Outcomes (Bed Mobility Goal 1, PT)  goal ongoing  -LR     Row Name 02/09/19 0944          Transfer Goal 1 (PT)    Activity/Assistive Device (Transfer Goal 1, PT)  sit-to-stand/stand-to-sit;walker, rolling  -LR     [place] Level/Cues Needed (Transfer Goal 1, PT)  conditional independence  -LR     Time Frame (Transfer Goal 1, PT)  long term goal (LTG);5 days  -LR     Progress/Outcome (Transfer Goal 1, PT)  goal ongoing  -LR     Row Name 02/09/19 0944          Gait Training Goal 1 (PT)    Activity/Assistive Device (Gait Training Goal 1, PT)  gait (walking locomotion);walker, rolling  -LR     [place] Level (Gait Training Goal 1, PT)  conditional independence  -LR     Distance (Gait Goal 1, PT)  500 feet  -LR     Time Frame (Gait Training Goal 1, PT)  long term goal (LTG);5 days  -LR     Progress/Outcome (Gait Training Goal 1, PT)  goal ongoing  -LR     Row Name 02/09/19 0944          Stairs Goal 1 (PT)    Activity/Assistive Device (Stairs Goal 1, PT)  ascending stairs;descending stairs;step-to-step;walker, rolling;other (see comments) backwards  -LR     [place] Level/Cues Needed (Stairs Goal 1, PT)  contact guard assist  -LR     Number of Stairs (Stairs Goal 1, PT)  1  -LR     Time Frame (Stairs Goal 1, PT)  long term goal (LTG);5 days  -LR     Progress/Outcome (Stairs Goal 1, PT)  goal ongoing  -LR     Row Name 02/09/19 0944          Positioning and Restraints    Pre-Treatment Position  in bed  -LR     Post Treatment Position  bed  -LR     In Bed  notified nsg;supine;call light within reach;encouraged to call for assist;exit alarm on;side rails up x2;SCD pump applied;legs elevated  -LR     Row Name 02/09/19 0944          Living Environment    Home Accessibility  not  wheelchair accessible;stairs to enter home;tub/shower is not walk in  -LR       User Key  (r) = Recorded By, (t) = Taken By, (c) = Cosigned By    Initials Name Provider Type    LR Michelle Mejias, PT Physical Therapist    Tatyana Leach, RN Registered Nurse        Physical Therapy Education     Title: PT OT SLP Therapies (Done)     Topic: Physical Therapy (Done)     Point: Mobility training (Done)     Learning Progress Summary           Patient Acceptance, D,E,H, VU,NR by LR at 2/9/2019  9:44 AM    Comment:  Issued and reviewed written/illustrated HEP. Educated on spinal precautions, correct log rolling technique, correct sit<->stand t/f technique, correct gait mechanics, and progression of POC.                   Point: Home exercise program (Done)     Learning Progress Summary           Patient Acceptance, D,E,H, VU,NR by LR at 2/9/2019  9:44 AM    Comment:  Issued and reviewed written/illustrated HEP. Educated on spinal precautions, correct log rolling technique, correct sit<->stand t/f technique, correct gait mechanics, and progression of POC.                   Point: Body mechanics (Done)     Learning Progress Summary           Patient Acceptance, D,E,H, VU,NR by LR at 2/9/2019  9:44 AM    Comment:  Issued and reviewed written/illustrated HEP. Educated on spinal precautions, correct log rolling technique, correct sit<->stand t/f technique, correct gait mechanics, and progression of POC.                   Point: Precautions (Done)     Learning Progress Summary           Patient Acceptance, D,E,H, VU,NR by LR at 2/9/2019  9:44 AM    Comment:  Issued and reviewed written/illustrated HEP. Educated on spinal precautions, correct log rolling technique, correct sit<->stand t/f technique, correct gait mechanics, and progression of POC.                               User Key     Initials Effective Dates Name Provider Type Discipline     06/19/15 -  Michelle Mejias, PT Physical Therapist PT               PT Recommendation and Plan  Anticipated Discharge Disposition (PT): home with assist, home with home health  Planned Therapy Interventions (PT Eval): balance training, bed mobility training, gait training, home exercise program, patient/family education, ROM (range of motion), stair training, strengthening, transfer training  Therapy Frequency (PT Clinical Impression): daily  Outcome Summary/Treatment Plan (PT)  Anticipated Equipment Needs at Discharge (PT): front wheeled walker  Anticipated Discharge Disposition (PT): home with assist, home with home health  Plan of Care Reviewed With: patient  Progress: improving  Outcome Summary: Patient ambulated 160 feet with RW, limited by pain. Significant pain with all mobility. Will continue to progress mobility training and strengthening as able. Encouraged frequent ambulation with nursing. Plan is d/c home with family and HHPT.   Outcome Measures     Row Name 02/09/19 0944 02/09/19 0943          How much help from another person do you currently need...    Turning from your back to your side while in flat bed without using bedrails?  3  -LR  --     Moving from lying on back to sitting on the side of a flat bed without bedrails?  3  -LR  --     Moving to and from a bed to a chair (including a wheelchair)?  3  -LR  --     Standing up from a chair using your arms (e.g., wheelchair, bedside chair)?  3  -LR  --     Climbing 3-5 steps with a railing?  3  -LR  --     To walk in hospital room?  3  -LR  --     AM-PAC 6 Clicks Score  18  -LR  --        How much help from another is currently needed...    Putting on and taking off regular lower body clothing?  --  2  -TB     Bathing (including washing, rinsing, and drying)  --  3  -TB     Toileting (which includes using toilet bed pan or urinal)  --  3  -TB     Putting on and taking off regular upper body clothing  --  3  -TB     Taking care of personal grooming (such as brushing teeth)  --  3  -TB     Eating meals  --  4  -TB      Score  --  18  -TB        Functional Assessment    Outcome Measure Options  AM-PAC 6 Clicks Basic Mobility (PT)  -LR  AM-PAC 6 Clicks Daily Activity (OT)  -TB       User Key  (r) = Recorded By, (t) = Taken By, (c) = Cosigned By    Initials Name Provider Type    TB Leana Gonsalves, OT Occupational Therapist    LR Michelle Mejias, PT Physical Therapist         Time Calculation:   PT Charges     Row Name 02/09/19 0944             Time Calculation    Start Time  0944  -LR      PT Received On  02/09/19  -LR      PT Goal Re-Cert Due Date  02/19/19  -LR         Time Calculation- PT    Total Timed Code Minutes- PT  23 minute(s)  -LR         Timed Charges    27473 - PT Therapeutic Exercise Minutes  10  -LR      35931 - Gait Training Minutes   13  -LR        User Key  (r) = Recorded By, (t) = Taken By, (c) = Cosigned By    Initials Name Provider Type    LR Michelle Mejias, PT Physical Therapist        Therapy Suggested Charges     Code   Minutes Charges    36356 (CPT®) Hc Pt Neuromusc Re Education Ea 15 Min      80569 (CPT®) Hc Pt Ther Proc Ea 15 Min 10 1    94928 (CPT®) Hc Gait Training Ea 15 Min 13 1    36663 (CPT®) Hc Pt Therapeutic Act Ea 15 Min      99626 (CPT®) Hc Pt Manual Therapy Ea 15 Min      06605 (CPT®) Hc Pt Iontophoresis Ea 15 Min      20464 (CPT®) Hc Pt Elec Stim Ea-Per 15 Min      83918 (CPT®) Hc Pt Ultrasound Ea 15 Min      74856 (CPT®) Hc Pt Self Care/Mgmt/Train Ea 15 Min      97019 (CPT®) Hc Pt Prosthetic (S) Train Initial Encounter, Each 15 Min      31805 (CPT®) Hc Pt Orthotic(S)/Prosthetic(S) Encounter, Each 15 Min      37111 (CPT®) Hc Orthotic(S) Mgmt/Train Initial Encounter, Each 15min      Total  23 2        Therapy Charges for Today     Code Description Service Date Service Provider Modifiers Qty    63494874425 HC PT THER PROC EA 15 MIN 2/9/2019 Michelle Mejias, PT GP 1    47117882219 HC GAIT TRAINING EA 15 MIN 2/9/2019 Michelle Mejias, PT GP 1    20869833838 HC  PT EVAL LOW COMPLEXITY 3 2/9/2019 Michelle Mejias, PT GP 1          PT G-Codes  Outcome Measure Options: AM-PAC 6 Clicks Basic Mobility (PT)  AM-PAC 6 Clicks Score: 18  Score: 18      Michelle Mejias, PT  2/9/2019

## 2019-02-09 NOTE — PLAN OF CARE
Problem: Patient Care Overview  Goal: Plan of Care Review  Outcome: Ongoing (interventions implemented as appropriate)   02/09/19 0624   Coping/Psychosocial   Plan of Care Reviewed With patient   Plan of Care Review   Progress no change   OTHER   Outcome Summary Pt awake almost entire night shift, c/o lower back pain, PCA pump in use, BP running on the lower side so advised pt to hold using PCA, pt states percocet seems to help better and last longer than dilaudid PCA. Moss d/c'd at 0530 without dif. Pt alert, cooperative. VSS       Problem: Laminectomy/Foraminotomy/Discectomy (Adult)  Goal: Signs and Symptoms of Listed Potential Problems Will be Absent, Minimized or Managed (Laminectomy/Foraminotomy/Discectomy)  Outcome: Ongoing (interventions implemented as appropriate)      Problem: Pain, Acute (Adult)  Goal: Identify Related Risk Factors and Signs and Symptoms  Outcome: Ongoing (interventions implemented as appropriate)

## 2019-02-09 NOTE — PLAN OF CARE
Problem: Patient Care Overview  Goal: Individualization and Mutuality  Outcome: Ongoing (interventions implemented as appropriate)  PCA continues to be used with relief of pain for pt. Continent of bowel and bladder, up with 1 assist. Denies needs at this time.  Goal: Discharge Needs Assessment  Outcome: Ongoing (interventions implemented as appropriate)    Goal: Interprofessional Rounds/Family Conf  Outcome: Ongoing (interventions implemented as appropriate)      Problem: Laminectomy/Foraminotomy/Discectomy (Adult)  Goal: Signs and Symptoms of Listed Potential Problems Will be Absent, Minimized or Managed (Laminectomy/Foraminotomy/Discectomy)  Outcome: Ongoing (interventions implemented as appropriate)    Goal: Anesthesia/Sedation Recovery  Outcome: Ongoing (interventions implemented as appropriate)      Problem: Pain, Acute (Adult)  Goal: Identify Related Risk Factors and Signs and Symptoms  Outcome: Ongoing (interventions implemented as appropriate)    Goal: Acceptable Pain Control/Comfort Level  Outcome: Ongoing (interventions implemented as appropriate)

## 2019-02-09 NOTE — PROGRESS NOTES
NEUROSURGERY PROGRESS NOTE    Vital Signs  Blood pressure 91/46, pulse 73, temperature 97.4 °F (36.3 °C), temperature source Temporal, resp. rate 16, SpO2 96 %, not currently breastfeeding.    Interval History:   POD #1: L5-S1 right TLIF with pedicle screw fixation for recurrent disc herniation.    Complains of right-sided back pain as expected.  Motor intact in the lower extremities.    Plan to ambulate today.  PT today.  Lumbar x-rays today to confirm hardware placement.  Continued PCA and IV for now.  Blood pressure low, but pulse normal, had very little blood loss during surgery, so hypovolemia unlikely.      ASSESSMENT: POD #1: L5-S1 right TLIF with pedicle screw fixation.  Doing well.    PLAN: DC PCA tomorrow.  Discharge home Monday.    César Peraza MD  02/09/19  6:48 AM

## 2019-02-09 NOTE — PLAN OF CARE
Problem: Patient Care Overview  Goal: Plan of Care Review  Outcome: Ongoing (interventions implemented as appropriate)   02/09/19 0919   Coping/Psychosocial   Plan of Care Reviewed With patient   Plan of Care Review   Progress improving   OTHER   Outcome Summary Patient ambulated 160 feet with RW, limited by pain. Significant pain with all mobility. Will continue to progress mobility training and strengthening as able. Encouraged frequent ambulation with nursing. Plan is d/c home with family and HHPT.

## 2019-02-10 PROCEDURE — 97110 THERAPEUTIC EXERCISES: CPT

## 2019-02-10 PROCEDURE — 97535 SELF CARE MNGMENT TRAINING: CPT

## 2019-02-10 RX ORDER — BACLOFEN 10 MG/1
10 TABLET ORAL EVERY 6 HOURS SCHEDULED
Status: DISCONTINUED | OUTPATIENT
Start: 2019-02-10 | End: 2019-02-11 | Stop reason: HOSPADM

## 2019-02-10 RX ADMIN — OXYCODONE HYDROCHLORIDE AND ACETAMINOPHEN 1 TABLET: 7.5; 325 TABLET ORAL at 01:08

## 2019-02-10 RX ADMIN — FAMOTIDINE 20 MG: 20 TABLET, FILM COATED ORAL at 08:28

## 2019-02-10 RX ADMIN — OXYCODONE HYDROCHLORIDE AND ACETAMINOPHEN 1 TABLET: 7.5; 325 TABLET ORAL at 12:37

## 2019-02-10 RX ADMIN — OXYCODONE HYDROCHLORIDE AND ACETAMINOPHEN 1 TABLET: 7.5; 325 TABLET ORAL at 16:56

## 2019-02-10 RX ADMIN — BACLOFEN 10 MG: 10 TABLET ORAL at 08:28

## 2019-02-10 RX ADMIN — METHOCARBAMOL TABLETS 1000 MG: 500 TABLET, COATED ORAL at 21:36

## 2019-02-10 RX ADMIN — OXYCODONE HYDROCHLORIDE AND ACETAMINOPHEN 1 TABLET: 7.5; 325 TABLET ORAL at 08:28

## 2019-02-10 RX ADMIN — CETIRIZINE HYDROCHLORIDE 10 MG: 10 TABLET, FILM COATED ORAL at 21:36

## 2019-02-10 RX ADMIN — OXYCODONE HYDROCHLORIDE AND ACETAMINOPHEN 1 TABLET: 7.5; 325 TABLET ORAL at 21:36

## 2019-02-10 RX ADMIN — BACLOFEN 10 MG: 10 TABLET ORAL at 12:38

## 2019-02-10 RX ADMIN — BACLOFEN 10 MG: 10 TABLET ORAL at 16:56

## 2019-02-10 RX ADMIN — SENNOSIDES AND DOCUSATE SODIUM 1 TABLET: 8.6; 5 TABLET ORAL at 21:36

## 2019-02-10 NOTE — PLAN OF CARE
Problem: Patient Care Overview  Goal: Plan of Care Review  Outcome: Ongoing (interventions implemented as appropriate)   02/10/19 1106   Coping/Psychosocial   Plan of Care Reviewed With patient   Plan of Care Review   Progress improving   OTHER   Outcome Summary Patient increased gait distance to 180 feet with RW, limited by burning pain in back. CGA for all mobility. Will continue to progress as able. Encouraged frequent ambulation. Will progress HEP and complete stair training tomorrow to prepare for d/c home.

## 2019-02-10 NOTE — PLAN OF CARE
Problem: Patient Care Overview  Goal: Plan of Care Review  Outcome: Ongoing (interventions implemented as appropriate)   02/10/19 0326   Coping/Psychosocial   Plan of Care Reviewed With patient   OTHER   Outcome Summary Patient refused to ambulate the hallway. Ambulates to the BR with assist x 1. VSS. PCA utilized for pain management.        Problem: Laminectomy/Foraminotomy/Discectomy (Adult)  Goal: Signs and Symptoms of Listed Potential Problems Will be Absent, Minimized or Managed (Laminectomy/Foraminotomy/Discectomy)  Outcome: Ongoing (interventions implemented as appropriate)      Problem: Pain, Acute (Adult)  Goal: Identify Related Risk Factors and Signs and Symptoms  Outcome: Ongoing (interventions implemented as appropriate)

## 2019-02-10 NOTE — THERAPY TREATMENT NOTE
Acute Care - Physical Therapy Treatment Note  [facility]     Patient Name: Elisa Hugo  : 1985  MRN: [id number]  Today's Date: 2/10/2019  Onset of Illness/Injury or Date of Surgery: 19  Date of Referral to PT: 19  Referring Physician: MD Stu    Admit Date: 2019    Visit Dx:    ICD-10-CM ICD-9-CM   1. Impaired functional mobility, balance, gait, and endurance Z74.09 V49.89   2. Impaired mobility and ADLs Z74.09 799.89     Patient Active Problem List   Diagnosis   • Status post cervical spinal fusion   • Neck pain   • Gastro-esophageal reflux disease without esophagitis   • Intervertebral disc disorder   • Sciatica   • Sleep disorder   • Sciatica of right side   • S/P lumbar discectomy L5-S1 RIGHT on 10/17/18   • Herniation of intervertebral disc of lumbar spine       Therapy Treatment    Rehabilitation Treatment Summary     Row Name 02/10/19 1106 02/10/19 0942          Treatment Time/Intention    Discipline  physical therapist  -LR  occupational therapist  -TB     Document Type  therapy note (daily note)  -LR  therapy note (daily note)  -TB     Subjective Information  complains of;pain;fatigue  -LR  complains of;fatigue;pain  -TB     Mode of Treatment  physical therapy;individual therapy  -LR  individual therapy  -TB     Patient/Family Observations  Patient supine in bed upon arrival. Pulse ox.   -LR  No family present; pt in bed, RA  -TB     Care Plan Review  care plan/treatment goals reviewed;risks/benefits reviewed;current/potential barriers reviewed;patient/other agree to care plan  -LR  --     Patient Effort  good  -LR  good  -TB     Existing Precautions/Restrictions  fall;spinal  -LR  fall;spinal  -TB     Treatment Considerations/Comments  --  Pt presents with SOA (sats 98%) c/o acute fatigue following morning bath with PCT  -TB     Recorded by [LR] Michelle Mejias, PT 02/10/19 1129 [TB] Leana Gonsalves, OT 02/10/19 1023     Row Name 02/10/19 0942             Vital  Signs    Pre Systolic BP Rehab  -- RN cleared OT  -TB      O2 Delivery Post Treatment  room air  -TB      Pre Patient Position  Supine  -TB      Intra Patient Position  Sitting  -TB      Post Patient Position  Supine  -TB      Recorded by [TB] Leana Gonsalves, OT 02/10/19 1023      Row Name 02/10/19 1106 02/10/19 0942          Cognitive Assessment/Intervention- PT/OT    Orientation Status (Cognition)  oriented x 4  -LR  oriented x 4  -TB     Follows Commands (Cognition)  WFL;follows one step commands;over 90% accuracy;verbal cues/prompting required  -LR  WFL  -TB     Safety Deficit (Cognitive)  safety precautions awareness;safety precautions follow-through/compliance  -LR  --     Recorded by [LR] Michelle Mejias, PT 02/10/19 1129 [TB] Leana Gonsalves, OT 02/10/19 1023     Row Name 02/10/19 1106             Safety Issues, Functional Mobility    Safety Issues Affecting Function (Mobility)  safety precaution awareness;safety precautions follow-through/compliance  -LR      Recorded by [LR] Michelle Mejias, PT 02/10/19 1129      Row Name 02/10/19 1106 02/10/19 0942          Bed Mobility Assessment/Treatment    Bed Mobility Assessment/Treatment  --  sidelying-sit;sit-sidelying  -TB     Rolling Left [place] (Bed Mobility)  --  contact guard;verbal cues  -TB     Supine-Sit [place] (Bed Mobility)  verbal cues;contact guard  -LR  --     Sit-Supine [place] (Bed Mobility)  verbal cues;supervision  -LR  --     Sidelying-Sit [place] (Bed Mobility)  --  contact guard;verbal cues  -TB     Sit-Sidelying [place] (Bed Mobility)  --  contact guard;verbal cues  -TB     Bed Mobility, Safety Issues  decreased use of legs for bridging/pushing  -LR  --     Assistive Device (Bed Mobility)  head of bed elevated;bed rails  -LR  bed rails  -TB     Comment (Bed Mobility)  Verbal cues to flex knees and roll hips and shoulders at same time onto L side. Verbal cues to drop LE off EOB and to push  up from bed to raise trunk into sitting. Verbal cues to lay back down on L side while lifting LEs up into bed. Verbal cues to then roll hips and shoulders at same time into supine. Patient required a rest break prior to rolling back into supine d/t pain. CGA at trunk to sit up, Supervision to LEs to lay back down.   -LR  Education reinforced for spinal precautions and logroll sequencing  -TB     Recorded by [LR] Michelle Mejias, PT 02/10/19 1129 [TB] Leana Gonsalves, OT 02/10/19 1023     Row Name 02/10/19 0942             Functional Mobility    Functional Mobility- Comment  Defer to PT  -TB      Recorded by [TB] Leana Gonsalves, OT 02/10/19 1023      Row Name 02/10/19 1106 02/10/19 0942          Transfer Assessment/Treatment    Transfer Assessment/Treatment  sit-stand transfer;stand-sit transfer  -LR  sit-stand transfer;stand-sit transfer;bathtub transfer  -TB     Comment (Transfers)  Verbal cues to push up from bed to stand and to reach back for bed to lower into sitting. Verbal cues to limit trunk flexion during t/f. Patient stood with flexed knees, cues for extension at knees to rise into standing.   -LR  --     Recorded by [LR] Michelle Mejias, PT 02/10/19 1129 [TB] Leana Gonsalves, OT 02/10/19 1023     Row Name 02/10/19 1106 02/10/19 0942          Sit-Stand Transfer    Sit-Stand [place] (Transfers)  verbal cues;contact guard  -LR  contact guard;verbal cues  -TB     Assistive Device (Sit-Stand Transfers)  walker, front-wheeled  -LR  walker, front-wheeled  -TB     Recorded by [LR] Michelle Mejias, PT 02/10/19 1129 [TB] Leana Gonsalves, OT 02/10/19 1023     Row Name 02/10/19 1106 02/10/19 0942          Stand-Sit Transfer    Stand-Sit [place] (Transfers)  verbal cues;contact guard  -LR  contact guard;verbal cues  -TB     Assistive Device (Stand-Sit Transfers)  walker, front-wheeled  -LR  walker, front-wheeled  -TB     Recorded by [LR] Michelle Mejias  Emperatriz, PT 02/10/19 1129 [TB] Leana Gonsalves, OT 02/10/19 1023     Row Name 02/10/19 0942             Bathtub Transfer    Type (Bathtub Transfer)  -- Education/problem solving for home tub transfer, home safety  -TB      [place] Level (Bathtub Transfer)  -- and maintaining precautions  -TB      Recorded by [TB] Leana Gonsalves, OT 02/10/19 1023      Row Name 02/10/19 1106             Gait/Stairs Assessment/Training    85395 - Gait Training Minutes   7  -LR      [place] Level (Gait)  verbal cues;contact guard  -LR      Assistive Device (Gait)  walker, front-wheeled  -LR      Distance in Feet (Gait)  180  -LR      Pattern (Gait)  step-through  -LR      Deviations/Abnormal Patterns (Gait)  bilateral deviations;ranjan decreased;gait speed decreased;stride length decreased  -LR      Bilateral Gait Deviations  forward flexed posture;heel strike decreased  -LR      Comment (Gait/Stairs)  Patient ambulated with step through gait pattern at slow pace. Min improvement with cues for increased step length, increased LE weight bearing, decreased UE weight bearing, and upright posture. Gait limited by increased pain.   -LR      Recorded by [LR] Michelle Mejias, PT 02/10/19 1129      Row Name 02/10/19 0942             ADL Assessment/Intervention    23208 - OT Self Care/Mgmt Minutes  25  -TB      BADL Assessment/Intervention  bathing;lower body dressing  -TB      Recorded by [TB] Leana Gonsalves, OT 02/10/19 1023      Row Name 02/10/19 0942             Bathing Assessment/Intervention    Bathing [place] Level  lower body;set up  -TB      Assistive Devices (Bathing)  long-handled sponge  -TB      Bathing Position  edge of bed sitting  -TB      Comment (Bathing)  Education reinforced for spinal precautions with ADLs and use of AE; good teach back  -TB      Recorded by [TB] Leana Gonsalves OT 02/10/19 1023      Row Name 02/10/19 0942             Lower Body Dressing  Assessment/Training    Lower Body Dressing [place] Level  doff;don;pants/bottoms;shoes/slippers;socks;minimum assist (75% patient effort);verbal cues  -TB      Assistive Devices (Lower Body Dressing)  long-handled shoe horn;reacher;sock-aid  -TB      Lower Body Dressing Position  edge of bed sitting  -TB      Comment (Lower Body Dressing)  Education reinforced for spinal precautions with ADLs and use of AE; good teach back; OT placed elastic laces in shoes  -TB      Recorded by [TB] Leana Gonsalves, OT 02/10/19 1023      Row Name 02/10/19 0942             BADL Safety/Performance    Impairments, BADL Safety/Performance  endurance/activity tolerance;shortness of breath;pain  -TB      Skilled BADL Treatment/Intervention  BADL process/adaptation training;adaptive equipment training  -TB      Recorded by [TB] Leana Gonsalves, OT 02/10/19 1023      Row Name 02/10/19 0942             Motor Skills Assessment/Interventions    Additional Documentation  Balance (Group)  -TB      Recorded by [TB] Leana Gonsalves, OT 02/10/19 1023      Row Name 02/10/19 1106             Therapeutic Exercise    12084 - PT Therapeutic Exercise Minutes  8  -LR      Recorded by [LR] Michelle Mejias, PT 02/10/19 1129      Row Name 02/10/19 0942             Upper Extremity Supine Therapeutic Exercise    Performed, Supine Upper Extremity (Therapeutic Exercise)  shoulder flexion/extension for pain reduction  -TB      Exercise Type, Supine Upper Extremity (Therapeutic Exercise)  AROM (active range of motion)  -TB      Comment, Supine Upper Extremity (Therapeutic Exercise)  Education reinforced for spinal precautions, use of AE, HEP and home safety/tub transfers  -TB      Recorded by [TB] Leana Gonsalves, OT 02/10/19 1023      Row Name 02/10/19 1106             Therapeutic Exercise    Lower Extremity (Therapeutic Exercise)  gluteal sets;heel slides, bilateral;quad sets, bilateral  -LR      Lower Extremity Range of  Motion (Therapeutic Exercise)  hip internal/external rotation, bilateral;ankle dorsiflexion/plantar flexion, bilateral  -LR      Core Strength (Therapeutic Exercise)  abdominal bracing ab sets  -LR      Exercise Type (Therapeutic Exercise)  AROM (active range of motion);isometric contraction, static;isotonic contraction, concentric;AAROM (active assistive range of motion)  -LR      Position (Therapeutic Exercise)  supine  -LR      Sets/Reps (Therapeutic Exercise)  x10 reps each  -LR      Comment (Therapeutic Exercise)  cues for technique; min assist with R heel slides  -LR      Recorded by [LR] Michelle Mejias, PT 02/10/19 1129      Row Name 02/10/19 0942             Balance    Balance  dynamic sitting balance  -TB      Recorded by [TB] Leana Gonsalves, OT 02/10/19 1023      Row Name 02/10/19 0942             Dynamic Sitting Balance    Level of [place], Reaches Outside Midline (Sitting, Dynamic Balance)  standby assist  -TB      Sitting Position, Reaches Outside Midline (Sitting, Dynamic Balance)  sitting on edge of bed  -TB      Comment, Reaches Outside Midline (Sitting, Dynamic Balance)  ADL tasks, AE teaching  -TB      Recorded by [TB] Leana Gonsalves, OT 02/10/19 1023      Row Name 02/10/19 1106 02/10/19 0942          Positioning and Restraints    Pre-Treatment Position  in bed  -LR  in bed  -TB     Post Treatment Position  bed  -LR  bed  -TB     In Bed  notified nsg;supine;call light within reach;encouraged to call for assist;side rails up x2;legs elevated  -LR  notified nsg;supine;call light within reach;encouraged to call for assist;exit alarm on  -TB     Recorded by [LR] Michelle Mejias, PT 02/10/19 1129 [TB] Leana Gonsalves, OT 02/10/19 1023     Row Name 02/10/19 1106             Pain Assessment    Additional Documentation  Pain Scale: Numbers Pre/Post-Treatment (Group)  -LR      Recorded by [LR] Michelle Mejias, PT 02/10/19 1129      Row Name 02/10/19 1106  02/10/19 0942          Pain Scale: Numbers Pre/Post-Treatment    Pain Scale: Numbers, Pretreatment  9/10  -LR  8/10  -TB     Pain Scale: Numbers, Post-Treatment  10/10  -LR  9/10  -TB     Pain Location - Orientation  lower  -LR  lower  -TB     Pain Location  back  -LR  back  -TB     Pre/Post Treatment Pain Comment  c/o burning pain in back, recommended ice pack, patient declined  -LR  --     Pain Intervention(s)  Repositioned;Ambulation/increased activity  -LR  Repositioned  -TB     Recorded by [LR] Michelle Mejias, PT 02/10/19 1129 [TB] Leana Gonsalves, OT 02/10/19 1023     Row Name                Wound 02/08/19 1808 Right back incision    Wound - Properties Group Date first assessed: 02/08/19 [EH] Time first assessed: 1808 [EH] Side: Right [EH] Location: back [EH] Type: incision [EH] Recorded by:  [EH] Tatyana Hernandez RN 02/08/19 1808    Row Name 02/10/19 1106 02/10/19 0942          Coping    Observed Emotional State  accepting;cooperative  -LR  calm;cooperative  -TB     Verbalized Emotional State  acceptance  -LR  acceptance  -TB     Recorded by [LR] Michelle Mejias, PT 02/10/19 1129 [TB] Leana Gonsalves, OT 02/10/19 1023     Row Name 02/10/19 1106 02/10/19 0942          Plan of Care Review    Plan of Care Reviewed With  patient  -LR  patient  -TB     Recorded by [LR] Michelle Mejias, PT 02/10/19 1129 [TB] Leana Gonsalves, OT 02/10/19 1023     Row Name 02/10/19 0942             Outcome Summary/Treatment Plan (OT)    Daily Summary of Progress (OT)  progress toward functional goals as expected  -TB      Barriers to Overall Progress (OT)  pain  -TB      Plan for Continued Treatment (OT)  per POC  -TB      Anticipated Discharge Disposition (OT)  home with assist  -TB      Recorded by [TB] Leana Gonsalves, OT 02/10/19 1023      Row Name 02/10/19 1106             Outcome Summary/Treatment Plan (PT)    Daily Summary of Progress (PT)  progress toward functional goals is good   -LR      Recorded by [LR] Randell Michelle Awan, PT 02/10/19 1129        User Key  (r) = Recorded By, (t) = Taken By, (c) = Cosigned By    Initials Name Effective Dates Discipline    TB Brina Leanadebbie Scott, OT 06/08/18 -  OT    LR Michelle Mejias, PT 06/19/15 -  PT    Tatyana Leach RN 08/17/17 -  Nurse          Wound 02/08/19 1806 Right back incision (Active)   Dressing Appearance dry;intact;no drainage 2/10/2019  8:28 AM   Dressing Care, Wound dressing changed 2/10/2019  8:28 AM           Physical Therapy Education     Title: PT OT SLP Therapies (Done)     Topic: Physical Therapy (Done)     Point: Mobility training (Done)     Learning Progress Summary           Patient Acceptance, E,D, VU,NR by LR at 2/10/2019 11:06 AM    Comment:  Educated on spinal precautions, correct log rolling technique, correct sit<->stand t/f technique, correct gait mechanics, benefits of frequent ambulation, and progression of POC.    Acceptance, D,E,H, VU,NR by LR at 2/9/2019  9:44 AM    Comment:  Issued and reviewed written/illustrated HEP. Educated on spinal precautions, correct log rolling technique, correct sit<->stand t/f technique, correct gait mechanics, and progression of POC.                   Point: Home exercise program (Done)     Learning Progress Summary           Patient Acceptance, E,D, VU,NR by LR at 2/10/2019 11:06 AM    Comment:  Educated on spinal precautions, correct log rolling technique, correct sit<->stand t/f technique, correct gait mechanics, benefits of frequent ambulation, and progression of POC.    Acceptance, D,E,H, VU,NR by LR at 2/9/2019  9:44 AM    Comment:  Issued and reviewed written/illustrated HEP. Educated on spinal precautions, correct log rolling technique, correct sit<->stand t/f technique, correct gait mechanics, and progression of POC.                   Point: Body mechanics (Done)     Learning Progress Summary           Patient Acceptance, E,D, VU,NR by LR at 2/10/2019 11:06 AM     Comment:  Educated on spinal precautions, correct log rolling technique, correct sit<->stand t/f technique, correct gait mechanics, benefits of frequent ambulation, and progression of POC.    Acceptance, D,E,H, VU,NR by LR at 2/9/2019  9:44 AM    Comment:  Issued and reviewed written/illustrated HEP. Educated on spinal precautions, correct log rolling technique, correct sit<->stand t/f technique, correct gait mechanics, and progression of POC.                   Point: Precautions (Done)     Learning Progress Summary           Patient Acceptance, E,D, VU,NR by LR at 2/10/2019 11:06 AM    Comment:  Educated on spinal precautions, correct log rolling technique, correct sit<->stand t/f technique, correct gait mechanics, benefits of frequent ambulation, and progression of POC.    Acceptance, D,E,H, VU,NR by LR at 2/9/2019  9:44 AM    Comment:  Issued and reviewed written/illustrated HEP. Educated on spinal precautions, correct log rolling technique, correct sit<->stand t/f technique, correct gait mechanics, and progression of POC.                               User Key     Initials Effective Dates Name Provider Type Discipline    LR 06/19/15 -  Michelle Mejias, PT Physical Therapist PT                PT Recommendation and Plan  Anticipated Discharge Disposition (PT): home with assist, home with home health  Planned Therapy Interventions (PT Eval): balance training, bed mobility training, gait training, home exercise program, patient/family education, ROM (range of motion), stair training, strengthening, transfer training  Therapy Frequency (PT Clinical Impression): daily  Outcome Summary/Treatment Plan (PT)  Daily Summary of Progress (PT): progress toward functional goals is good  Anticipated Equipment Needs at Discharge (PT): front wheeled walker  Anticipated Discharge Disposition (PT): home with assist, home with home health  Plan of Care Reviewed With: patient  Progress: improving  Outcome Summary: Patient  increased gait distance to 180 feet with RW, limited by burning pain in back. CGA for all mobility. Will continue to progress as able. Encouraged frequent ambulation. Will progress HEP and complete stair training tomorrow to prepare for d/c home.   Outcome Measures     Row Name 02/10/19 1106 02/10/19 0942 02/09/19 0944       How much help from another person do you currently need...    Turning from your back to your side while in flat bed without using bedrails?  4  -LR  --  3  -LR    Moving from lying on back to sitting on the side of a flat bed without bedrails?  3  -LR  --  3  -LR    Moving to and from a bed to a chair (including a wheelchair)?  3  -LR  --  3  -LR    Standing up from a chair using your arms (e.g., wheelchair, bedside chair)?  3  -LR  --  3  -LR    Climbing 3-5 steps with a railing?  2  -LR  --  3  -LR    To walk in hospital room?  3  -LR  --  3  -LR    AM-PAC 6 Clicks Score  18  -LR  --  18  -LR       How much help from another is currently needed...    Putting on and taking off regular lower body clothing?  --  3  -TB  --    Bathing (including washing, rinsing, and drying)  --  3  -TB  --    Toileting (which includes using toilet bed pan or urinal)  --  3  -TB  --    Putting on and taking off regular upper body clothing  --  3  -TB  --    Taking care of personal grooming (such as brushing teeth)  --  3  -TB  --    Eating meals  --  4  -TB  --    Score  --  19  -TB  --       Functional Assessment    Outcome Measure Options  AM-PAC 6 Clicks Basic Mobility (PT)  -LR  AM-PAC 6 Clicks Daily Activity (OT)  -TB  AM-PAC 6 Clicks Basic Mobility (PT)  -LR    Row Name 02/09/19 0943             How much help from another is currently needed...    Putting on and taking off regular lower body clothing?  2  -TB      Bathing (including washing, rinsing, and drying)  3  -TB      Toileting (which includes using toilet bed pan or urinal)  3  -TB      Putting on and taking off regular upper body clothing  3  -TB       Taking care of personal grooming (such as brushing teeth)  3  -TB      Eating meals  4  -TB      Score  18  -TB         Functional Assessment    Outcome Measure Options  AM-PAC 6 Clicks Daily Activity (OT)  -TB        User Key  (r) = Recorded By, (t) = Taken By, (c) = Cosigned By    Initials Name Provider Type    TB Leana Gonsalves, OT Occupational Therapist    LR Michelle Mejias, PT Physical Therapist         Time Calculation:   PT Charges     Row Name 02/10/19 1106             Time Calculation    Start Time  1106  -LR      PT Received On  02/10/19  -LR      PT Goal Re-Cert Due Date  02/19/19  -LR         Time Calculation- PT    Total Timed Code Minutes- PT  15 minute(s)  -LR         Timed Charges    92780 - PT Therapeutic Exercise Minutes  8  -LR      42549 - Gait Training Minutes   7  -LR        User Key  (r) = Recorded By, (t) = Taken By, (c) = Cosigned By    Initials Name Provider Type    LR Michelle Mejias, PT Physical Therapist        Therapy Suggested Charges     Code   Minutes Charges    47581 (CPT®) Hc Pt Neuromusc Re Education Ea 15 Min      18810 (CPT®) Hc Pt Ther Proc Ea 15 Min 8 1    54263 (CPT®) Hc Gait Training Ea 15 Min 7     75525 (CPT®) Hc Pt Therapeutic Act Ea 15 Min      78907 (CPT®) Hc Pt Manual Therapy Ea 15 Min      14259 (CPT®) Hc Pt Iontophoresis Ea 15 Min      99758 (CPT®) Hc Pt Elec Stim Ea-Per 15 Min      04906 (CPT®) Hc Pt Ultrasound Ea 15 Min      79460 (CPT®) Hc Pt Self Care/Mgmt/Train Ea 15 Min      56715 (CPT®) Hc Pt Prosthetic (S) Train Initial Encounter, Each 15 Min      17140 (CPT®) Hc Pt Orthotic(S)/Prosthetic(S) Encounter, Each 15 Min      08336 (CPT®) Hc Orthotic(S) Mgmt/Train Initial Encounter, Each 15min      Total  15 1        Therapy Charges for Today     Code Description Service Date Service Provider Modifiers Qty    62953875552 HC PT THER PROC EA 15 MIN 2/9/2019 Michelle Mejias, PT GP 1    09307024907 HC GAIT TRAINING EA 15 MIN 2/9/2019  Michelle Mejias, PT GP 1    72826497675 HC PT EVAL LOW COMPLEXITY 3 2/9/2019 Michelle Mejias, PT GP 1    50485579150 HC PT THER PROC EA 15 MIN 2/10/2019 Michelle Mejias, PT GP 1          PT G-Codes  Outcome Measure Options: AM-PAC 6 Clicks Basic Mobility (PT)  AM-PAC 6 Clicks Score: 18  Score: 19    Michelle Mejias, PT  2/10/2019

## 2019-02-10 NOTE — THERAPY TREATMENT NOTE
Acute Care - Occupational Therapy Treatment Note   [facility]     Patient Name: Elisa Hugo  : 1985  MRN: [id number]  Today's Date: 2/10/2019  Onset of Illness/Injury or Date of Surgery: 19  Date of Referral to OT: 19  Referring Physician: MD Stu    Admit Date: 2019       ICD-10-CM ICD-9-CM   1. Impaired functional mobility, balance, gait, and endurance Z74.09 V49.89   2. Impaired mobility and ADLs Z74.09 799.89     Patient Active Problem List   Diagnosis   • Status post cervical spinal fusion   • Neck pain   • Gastro-esophageal reflux disease without esophagitis   • Intervertebral disc disorder   • Sciatica   • Sleep disorder   • Sciatica of right side   • S/P lumbar discectomy L5-S1 RIGHT on 10/17/18   • Herniation of intervertebral disc of lumbar spine     Past Medical History:   Diagnosis Date   • Asthma    • GERD (gastroesophageal reflux disease)    • Headache    • History of seizure as     • History of staph infection 2008    right leg after delivery of daughter; po meds only     • Hx of migraines    • Lower back pain    • Lumbar herniated disc    • MRSA infection     history of   • Stomach ulcer      Past Surgical History:   Procedure Laterality Date   • ANTERIOR CERVICAL DISCECTOMY W/ FUSION Bilateral 2018    Procedure: CERVICAL DISCECTOMY ANTERIOR WITH FUSION C6-7  BILATERAL;  Surgeon: César Peraza MD;  Location: [facility] OR;  Service: Neurosurgery   • INTERVENTIONAL RADIOLOGY PROCEDURE N/A 2018    Procedure: myelogram cervical spine ;  Surgeon: Abdi Santana MD;  Location:  [facility] CATH INVASIVE LOCATION;  Service:    • INTERVENTIONAL RADIOLOGY PROCEDURE N/A 2018    Procedure: myelogram lumbar spine;  Surgeon: Abdi Santana MD;  Location:  [facility] CATH INVASIVE LOCATION;  Service: Interventional Radiology   • INTERVENTIONAL RADIOLOGY PROCEDURE N/A 2018    Procedure: IR myelogram lumbar spine;  Surgeon: Abdi Santana MD;  Location:  [facility] CATH  INVASIVE LOCATION;  Service: Interventional Radiology   • LUMBAR DISCECTOMY Right 10/17/2018    Procedure: LUMBAR DISCECTOMY L5-S1 RIGHT, MINIMALLY INVASIVE;  Surgeon: César Peraza MD;  Location: [facility];  Service: Neurosurgery   • SINUS SURGERY         Therapy Treatment    Rehabilitation Treatment Summary     Row Name 02/10/19 0942             Treatment Time/Intention    Discipline  occupational therapist  -TB      Document Type  therapy note (daily note)  -TB      Subjective Information  complains of;fatigue;pain  -TB      Mode of Treatment  individual therapy  -TB      Patient/Family Observations  No family present; pt in bed, RA  -TB      Patient Effort  good  -TB      Existing Precautions/Restrictions  fall;spinal  -TB      Treatment Considerations/Comments  Pt presents with SOA (sats 98%) c/o acute fatigue following morning bath with PCT  -TB      Recorded by [TB] Leana Gonsalves OT 02/10/19 1023      Row Name 02/10/19 0942             Vital Signs    Pre Systolic BP Rehab  -- RN cleared OT  -TB      O2 Delivery Post Treatment  room air  -TB      Pre Patient Position  Supine  -TB      Intra Patient Position  Sitting  -TB      Post Patient Position  Supine  -TB      Recorded by [TB] Leana Gonsalves OT 02/10/19 1023      Row Name 02/10/19 0942             Cognitive Assessment/Intervention- PT/OT    Orientation Status (Cognition)  oriented x 4  -TB      Follows Commands (Cognition)  WFL  -TB      Recorded by [TB] Leana Gonsalves OT 02/10/19 1023      Row Name 02/10/19 0942             Bed Mobility Assessment/Treatment    Bed Mobility Assessment/Treatment  sidelying-sit;sit-sidelying  -TB      Rolling Left [place] (Bed Mobility)  contact guard;verbal cues  -TB      Sidelying-Sit [place] (Bed Mobility)  contact guard;verbal cues  -TB      Sit-Sidelying [place] (Bed Mobility)  contact guard;verbal cues  -TB      Assistive Device (Bed Mobility)  bed rails  -TB      Comment  (Bed Mobility)  Education reinforced for spinal precautions and logroll sequencing  -TB      Recorded by [TB] Leana Gonsalves, OT 02/10/19 1023      Row Name 02/10/19 0942             Functional Mobility    Functional Mobility- Comment  Defer to PT  -TB      Recorded by [TB] Leana Gonsalves, OT 02/10/19 1023      Row Name 02/10/19 0942             Transfer Assessment/Treatment    Transfer Assessment/Treatment  sit-stand transfer;stand-sit transfer;bathtub transfer  -TB      Recorded by [TB] Leana Gonsalves, OT 02/10/19 1023      Row Name 02/10/19 0942             Sit-Stand Transfer    Sit-Stand [place] (Transfers)  contact guard;verbal cues  -TB      Assistive Device (Sit-Stand Transfers)  walker, front-wheeled  -TB      Recorded by [TB] Leana Gonsalves, OT 02/10/19 1023      Row Name 02/10/19 0942             Stand-Sit Transfer    Stand-Sit [place] (Transfers)  contact guard;verbal cues  -TB      Assistive Device (Stand-Sit Transfers)  walker, front-wheeled  -TB      Recorded by [TB] Leana Gonsalves, OT 02/10/19 1023      Row Name 02/10/19 0942             Bathtub Transfer    Type (Bathtub Transfer)  -- Education/problem solving for home tub transfer, home safety  -TB      [place] Level (Bathtub Transfer)  -- and maintaining precautions  -TB      Recorded by [TB] Leana Gonsalves, OT 02/10/19 1023      Row Name 02/10/19 0942             ADL Assessment/Intervention    74800 - OT Self Care/Mgmt Minutes  25  -TB      BADL Assessment/Intervention  bathing;lower body dressing  -TB      Recorded by [TB] Leana Gonsalves, OT 02/10/19 1023      Row Name 02/10/19 0942             Bathing Assessment/Intervention    Bathing [place] Level  lower body;set up  -TB      Assistive Devices (Bathing)  long-handled sponge  -TB      Bathing Position  edge of bed sitting  -TB      Comment (Bathing)  Education reinforced for spinal precautions with ADLs and use of AE;  good teach back  -TB      Recorded by [TB] Leana Gonsalves, OT 02/10/19 1023      Row Name 02/10/19 0942             Lower Body Dressing Assessment/Training    Lower Body Dressing [place] Level  doff;don;pants/bottoms;shoes/slippers;socks;minimum assist (75% patient effort);verbal cues  -TB      Assistive Devices (Lower Body Dressing)  long-handled shoe horn;reacher;sock-aid  -TB      Lower Body Dressing Position  edge of bed sitting  -TB      Comment (Lower Body Dressing)  Education reinforced for spinal precautions with ADLs and use of AE; good teach back; OT placed elastic laces in shoes  -TB      Recorded by [TB] Leana Gonsalves, OT 02/10/19 1023      Row Name 02/10/19 0942             BADL Safety/Performance    Impairments, BADL Safety/Performance  endurance/activity tolerance;shortness of breath;pain  -TB      Skilled BADL Treatment/Intervention  BADL process/adaptation training;adaptive equipment training  -TB      Recorded by [TB] Leana Gonsalves, OT 02/10/19 1023      Row Name 02/10/19 0942             Motor Skills Assessment/Interventions    Additional Documentation  Balance (Group)  -TB      Recorded by [TB] Leana Gonsalves, OT 02/10/19 1023      Row Name 02/10/19 0942             Upper Extremity Supine Therapeutic Exercise    Performed, Supine Upper Extremity (Therapeutic Exercise)  shoulder flexion/extension for pain reduction  -TB      Exercise Type, Supine Upper Extremity (Therapeutic Exercise)  AROM (active range of motion)  -TB      Comment, Supine Upper Extremity (Therapeutic Exercise)  Education reinforced for spinal precautions, use of AE, HEP and home safety/tub transfers  -TB      Recorded by [TB] Leana Gonsalves, OT 02/10/19 1023      Row Name 02/10/19 0942             Balance    Balance  dynamic sitting balance  -TB      Recorded by [TB] Leana Gonsalves OT 02/10/19 1023      Row Name 02/10/19 0942             Dynamic Sitting Balance    Level of  [place], Reaches Outside Midline (Sitting, Dynamic Balance)  standby assist  -TB      Sitting Position, Reaches Outside Midline (Sitting, Dynamic Balance)  sitting on edge of bed  -TB      Comment, Reaches Outside Midline (Sitting, Dynamic Balance)  ADL tasks, AE teaching  -TB      Recorded by [TB] Leana Gonsalves, OT 02/10/19 1023      Row Name 02/10/19 0942             Positioning and Restraints    Pre-Treatment Position  in bed  -TB      Post Treatment Position  bed  -TB      In Bed  notified nsg;supine;call light within reach;encouraged to call for assist;exit alarm on  -TB      Recorded by [TB] Leana Gonsalves, OT 02/10/19 1023      Row Name 02/10/19 0942             Pain Scale: Numbers Pre/Post-Treatment    Pain Scale: Numbers, Pretreatment  8/10  -TB      Pain Scale: Numbers, Post-Treatment  9/10  -TB      Pain Location - Orientation  lower  -TB      Pain Location  back  -TB      Pain Intervention(s)  Repositioned  -TB      Recorded by [TB] Leana Gonsalves, OT 02/10/19 1023      Row Name                Wound 02/08/19 1808 Right back incision    Wound - Properties Group Date first assessed: 02/08/19 [EH] Time first assessed: 1808 [EH] Side: Right [EH] Location: back [EH] Type: incision [EH] Recorded by:  [EH] Tatyana Hernandez RN 02/08/19 1808    Row Name 02/10/19 0942             Coping    Observed Emotional State  calm;cooperative  -TB      Verbalized Emotional State  acceptance  -TB      Recorded by [TB] Leana Gonsalves, OT 02/10/19 1023      Row Name 02/10/19 0942             Plan of Care Review    Plan of Care Reviewed With  patient  -TB      Recorded by [TB] Leana Gonsalves OT 02/10/19 1023      Row Name 02/10/19 0942             Outcome Summary/Treatment Plan (OT)    Daily Summary of Progress (OT)  progress toward functional goals as expected  -TB      Barriers to Overall Progress (OT)  pain  -TB      Plan for Continued Treatment (OT)  per POC  -TB      Anticipated  Discharge Disposition (OT)  home with assist  -TB      Recorded by [TB] Leana Gonsalves OT 02/10/19 1023        User Key  (r) = Recorded By, (t) = Taken By, (c) = Cosigned By    Initials Name Effective Dates Discipline     StephenTerrence mooncie NIKOLE Scott 06/08/18 -  OT    Tatyana Leach RN 08/17/17 -  Nurse        Wound 02/08/19 1808 Right back incision (Active)   Dressing Appearance dry;intact;no drainage 2/10/2019  8:28 AM   Dressing Care, Wound dressing changed 2/10/2019  8:28 AM       Occupational Therapy Education     Title: PT OT SLP Therapies (Done)     Topic: Occupational Therapy (Done)     Point: ADL training (Done)     Description: Instruct learner(s) on proper safety adaptation and remediation techniques during self care or transfers.   Instruct in proper use of assistive devices.    Learning Progress Summary           Patient Acceptance, E,TB,D, VU,DU,NR by TB at 2/10/2019 10:24 AM    Comment:  Education reinforced for spinal precautions, use of AE, HEP and home safety/tub transfers    Acceptance, E,D,TB, VU,DU,NR by TB at 2/9/2019 10:43 AM    Comment:  Education reinforced for benefits of OOB activity/therapy, role of OT, spinal precautions and use of AE                   Point: Precautions (Done)     Description: Instruct learner(s) on prescribed precautions during self-care and functional transfers.    Learning Progress Summary           Patient Acceptance, E,TB,D, VU,DU,NR by TB at 2/10/2019 10:24 AM    Comment:  Education reinforced for spinal precautions, use of AE, HEP and home safety/tub transfers    Acceptance, E,D,TB, VU,DU,NR by TB at 2/9/2019 10:43 AM    Comment:  Education reinforced for benefits of OOB activity/therapy, role of OT, spinal precautions and use of AE                               User Key     Initials Effective Dates Name Provider Type Discipline     06/08/18 -  Leana Gonsalves OT Occupational Therapist OT                OT Recommendation and Plan  Outcome  Summary/Treatment Plan (OT)  Daily Summary of Progress (OT): progress toward functional goals as expected  Barriers to Overall Progress (OT): pain  Plan for Continued Treatment (OT): per POC  Anticipated Equipment Needs at Discharge (OT): (None)  Anticipated Discharge Disposition (OT): home with assist  Planned Therapy Interventions (OT Eval): transfer/mobility retraining, occupation/activity based interventions, BADL retraining, adaptive equipment training  Therapy Frequency (OT Eval): daily  Daily Summary of Progress (OT): progress toward functional goals as expected  Plan of Care Review  Plan of Care Reviewed With: patient  Plan of Care Reviewed With: patient  Outcome Summary: Pt alert and agreeable to therapy with encouragement. CGA bed mobility via logroll. Education reinforced for spinal precautions and use of AE to maximize ADL independence with good teach back. HEP completed in supine for pain mgmt. OT to follow.    Outcome Measures     Row Name 02/10/19 0942 02/09/19 0944 02/09/19 0943       How much help from another person do you currently need...    Turning from your back to your side while in flat bed without using bedrails?  --  3  -LR  --    Moving from lying on back to sitting on the side of a flat bed without bedrails?  --  3  -LR  --    Moving to and from a bed to a chair (including a wheelchair)?  --  3  -LR  --    Standing up from a chair using your arms (e.g., wheelchair, bedside chair)?  --  3  -LR  --    Climbing 3-5 steps with a railing?  --  3  -LR  --    To walk in hospital room?  --  3  -LR  --    AM-PAC 6 Clicks Score  --  18  -LR  --       How much help from another is currently needed...    Putting on and taking off regular lower body clothing?  3  -TB  --  2  -TB    Bathing (including washing, rinsing, and drying)  3  -TB  --  3  -TB    Toileting (which includes using toilet bed pan or urinal)  3  -TB  --  3  -TB    Putting on and taking off regular upper body clothing  3  -TB  --  3   -TB    Taking care of personal grooming (such as brushing teeth)  3  -TB  --  3  -TB    Eating meals  4  -TB  --  4  -TB    Score  19  -TB  --  18  -TB       Functional Assessment    Outcome Measure Options  AM-PAC 6 Clicks Daily Activity (OT)  -TB  AM-PAC 6 Clicks Basic Mobility (PT)  -LR  AM-PAC 6 Clicks Daily Activity (OT)  -TB      User Key  (r) = Recorded By, (t) = Taken By, (c) = Cosigned By    Initials Name Provider Type    TB Leana Gonsalves, OT Occupational Therapist    LR Michelle Mejias, PT Physical Therapist           Time Calculation:   Time Calculation- OT     Row Name 02/10/19 1026 02/10/19 0942          Time Calculation- OT    OT Start Time  0942  -TB  --     OT Received On  02/10/19  -TB  --     OT Goal Re-Cert Due Date  02/19/19  -TB  --        Timed Charges    44786 - OT Self Care/Mgmt Minutes  --  25  -TB       User Key  (r) = Recorded By, (t) = Taken By, (c) = Cosigned By    Initials Name Provider Type    TB Leana Gonsalves, OT Occupational Therapist           Therapy Suggested Charges     Code   Minutes Charges    37365 (CPT®) Hc Ot Neuromusc Re Education Ea 15 Min      45385 (CPT®) Hc Ot Ther Proc Ea 15 Min      58010 (CPT®) Hc Ot Therapeutic Act Ea 15 Min      94658 (CPT®) Hc Ot Manual Therapy Ea 15 Min      91656 (CPT®) Hc Ot Iontophoresis Ea 15 Min      34913 (CPT®) Hc Ot Elec Stim Ea-Per 15 Min      97941 (CPT®) Hc Ot Ultrasound Ea 15 Min      56877 (CPT®) Hc Ot Self Care/Mgmt/Train Ea 15 Min 25 2    Total  25 2        Therapy Charges for Today     Code Description Service Date Service Provider Modifiers Qty    68404050498 HC OT SELF CARE/MGMT/TRAIN EA 15 MIN 2/9/2019 Leana Gonsalves OT GO 1    02841852853 HC OT EVAL LOW COMPLEXITY 3 2/9/2019 Leana Gonsalves OT GO 1    77205231366 HC OT SELF CARE/MGMT/TRAIN EA 15 MIN 2/10/2019 Leana Gonsalves OT GO 2               Leana Gonsalves, OT  2/10/2019

## 2019-02-10 NOTE — PROGRESS NOTES
FOLLOW UP ENCOUNTER          CHIEF COMPLAINT::   POD 2: PLIF L5-S1                        MEDICAL HISTORY SINCE LAST ENCOUNTER :       She has done reasonably well.  Complaining of incisional pain as anticipated.  She has been ambulatory although requiring encouragement.                                    ASSESSMENT/DISPOSITION :      1.  PCA to be discontinued today per orders.    2.  Initiated baclofen 10 mg for muscle spasm.    3.  Will discharge in next 1-2 days.

## 2019-02-11 VITALS
WEIGHT: 141.54 LBS | HEART RATE: 77 BPM | TEMPERATURE: 98.4 F | SYSTOLIC BLOOD PRESSURE: 95 MMHG | HEIGHT: 64 IN | OXYGEN SATURATION: 97 % | BODY MASS INDEX: 24.16 KG/M2 | DIASTOLIC BLOOD PRESSURE: 61 MMHG | RESPIRATION RATE: 17 BRPM

## 2019-02-11 PROCEDURE — 99024 POSTOP FOLLOW-UP VISIT: CPT | Performed by: PHYSICIAN ASSISTANT

## 2019-02-11 PROCEDURE — 97116 GAIT TRAINING THERAPY: CPT

## 2019-02-11 PROCEDURE — 97110 THERAPEUTIC EXERCISES: CPT

## 2019-02-11 PROCEDURE — 99024 POSTOP FOLLOW-UP VISIT: CPT | Performed by: NEUROLOGICAL SURGERY

## 2019-02-11 RX ORDER — ONDANSETRON 4 MG/1
4 TABLET, FILM COATED ORAL EVERY 6 HOURS PRN
Qty: 20 TABLET | Refills: 0 | Status: SHIPPED | OUTPATIENT
Start: 2019-02-11

## 2019-02-11 RX ORDER — METHOCARBAMOL 500 MG/1
1000 TABLET, FILM COATED ORAL 3 TIMES DAILY
Qty: 90 TABLET | Refills: 1 | Status: SHIPPED | OUTPATIENT
Start: 2019-02-11 | End: 2019-04-08 | Stop reason: SDUPTHER

## 2019-02-11 RX ADMIN — TEMAZEPAM 15 MG: 15 CAPSULE ORAL at 02:22

## 2019-02-11 RX ADMIN — BACLOFEN 10 MG: 10 TABLET ORAL at 06:22

## 2019-02-11 RX ADMIN — OXYCODONE HYDROCHLORIDE AND ACETAMINOPHEN 1 TABLET: 7.5; 325 TABLET ORAL at 06:22

## 2019-02-11 RX ADMIN — OXYCODONE HYDROCHLORIDE AND ACETAMINOPHEN 1 TABLET: 7.5; 325 TABLET ORAL at 02:22

## 2019-02-11 RX ADMIN — ONDANSETRON 4 MG: 4 TABLET, FILM COATED ORAL at 08:58

## 2019-02-11 RX ADMIN — BACLOFEN 10 MG: 10 TABLET ORAL at 00:13

## 2019-02-11 RX ADMIN — FAMOTIDINE 20 MG: 20 TABLET, FILM COATED ORAL at 08:32

## 2019-02-11 RX ADMIN — OXYCODONE HYDROCHLORIDE AND ACETAMINOPHEN 1 TABLET: 7.5; 325 TABLET ORAL at 10:27

## 2019-02-11 NOTE — DISCHARGE SUMMARY
DISCHARGE SUMMARY    Date of Admission: 2/8/2019    Date of Discharge:  2/11/2019    Discharge Diagnosis: Recurrent disc herniation L5-S1 right, sciatica    Procedures Performed:  Procedure(s):  LUMBAR LAMINECTOMY POSTERIOR LUMBAR INTERBODY FUSION RIGHT L5-S1      Presenting Problem/History of Present Illness  Herniation of intervertebral disc of lumbar spine [M51.26]  Herniation of intervertebral disc of lumbar spine [M51.26]     Hospital Course  Patient is a 33 y.o. female with a history of lumbar discectomy at L5-S1 right originally on 10/17/2018.  6 weeks following the procedure, she developed recurrent pain in her right hip and leg that had become intractable.  The pain and radicular symptoms were nonresponsive to exercise, steroids, muscle relaxers, or therapy.  She underwent a lumbar myelogram on 12/18/2018 which revealed a recurrent disc herniation at L5-S1 on the right.  At this point she had also developed absent right ankle jerk and numbness of the right lateral foot.  Due to the recurrence of symptoms and disc herniation so early after surgery, she was scheduled for discectomy L5-S1 with inclusion of interbody fusion and pedicle screws on 2/8/2019.  Surgery was uncomplicated and she remained inpatient for 3 nights.  She admitted to incisional pain which was anticipated following this procedure.  She was ambulatory with physical therapy with some pain.  She was voiding independently and tolerating oral intake.     Discharge Medications     Discharge Medications      New Medications      Instructions Start Date   ondansetron 4 MG tablet  Commonly known as:  ZOFRAN   4 mg, Oral, Every 6 Hours PRN         Changes to Medications      Instructions Start Date   methocarbamol 500 MG tablet  Commonly known as:  ROBAXIN  What changed:    · medication strength  · how much to take   1,000 mg, Oral, 3 Times Daily         Continue These Medications      Instructions Start Date   cetirizine 10 MG tablet  Commonly known  as:  zyrTEC   10 mg, Oral, Nightly      diphenhydrAMINE 25 mg capsule  Commonly known as:  BENADRYL   25 mg, Oral, Every 6 Hours PRN      VENTOLIN  (90 Base) MCG/ACT inhaler  Generic drug:  albuterol sulfate HFA   2 puffs, Inhalation, Every 4 Hours PRN         Stop These Medications    cefdinir 300 MG capsule  Commonly known as:  OMNICEF     chlorhexidine 4 % external liquid  Commonly known as:  HIBICLENS     HYDROcodone-acetaminophen 5-325 MG per tablet  Commonly known as:  NORCO     raNITIdine 150 MG tablet  Commonly known as:  ZANTAC          Disposition:  Ms. Hugo is stable for discharge home today. Over the course of her hospital stay, vitals remained stable and lumbar incision site remained clean, dry and intact. Motor and sensory function were found to be intact throughout. She was ambulatory, voiding independently, and tolerating oral intake. Patient was doing well at the time of discharge.  She will follow up with our office in approximately 4 weeks for her first postop visit.  She was instructed to contact our office if she has any concerns in the interim.   Irina Damon PA-C  02/11/19  10:51 AM

## 2019-02-11 NOTE — PROGRESS NOTES
Discharge Planning Assessment  [facility]     Patient Name: Elisa Hugo  MRN: [id number]  Today's Date: 2/11/2019    Admit Date: 2/8/2019    Discharge Needs Assessment     Row Name 02/11/19 1052       Living Environment    Lives With  child(larisa), dependent;parent(s)    Name(s) of Who Lives With Patient  10 yo son and parents, Mother, Susan Marie.    Current Living Arrangements  home/apartment/condo    Family Caregiver if Needed  parent(s)    Quality of Family Relationships  helpful;involved;supportive    Able to Return to Prior Arrangements  yes    Living Arrangement Comments  Ms. Hugo lives with her 10 yo son and parents in a one story home, 2 steps to enter, in [facility].  She stated that her son and parents are available to assist her at home as needed.       Resource/Environmental Concerns    Transportation Concerns  car, none       Transition Planning    Patient/Family Anticipates Transition to  home with family    Patient/Family Anticipated Services at Transition  none    Transportation Anticipated  family or friend will provide       Discharge Needs Assessment    Equipment Currently Used at Home  none    Equipment Needed After Discharge  walker, rolling        Discharge Plan     Row Name 02/11/19 1057       Plan    Plan  Home with family assistance    Patient/Family in Agreement with Plan  yes    Plan Comments  Met with Ms. Hugo at the bedside for discharge planning.  Ms. Hugo is ambulating with a rolling walker.  She requested a rolling walker for home use and did not have preference for provider.  Contacted [employer] and they will be delivering the walker to the hospital room today.  Ms. Hugo stated that she will have sufficient assistance at home from her family.  No other needs identified.  Ms. Garrido will be transported home by her Mother when discharged.    CM will follow up.    Final Discharge Disposition Code  01 - home or self-care        Destination      No service coordination in  this encounter.      Durable Medical Equipment - Selection Complete      Service Provider Request Status Selected Services Address Phone Number Fax Number    [facility] Selected Durable Medical Equipment [address] [address] [phone]      Dialysis/Infusion      No service coordination in this encounter.      Home Medical Care      No service coordination in this encounter.      Community Resources      No service coordination in this encounter.        Expected Discharge Date and Time     Expected Discharge Date Expected Discharge Time    Feb 11, 2019         Demographic Summary     Row Name 02/11/19 1050       General Information    Admission Type  inpatient    Arrived From  home    Reason for Consult  discharge planning    General Information Comments  PCP:  Mirella Vasquez       Contact Information    Permission Granted to Share Info With      Contact Information Comments  Mother:  Susan Marie, ph [phone]        Functional Status     Row Name 02/11/19 1051       Functional Status    Usual Activity Tolerance  good    Current Activity Tolerance  -- See PT notes       Functional Status, IADL    Medications  independent    Meal Preparation  independent    Housekeeping  independent    Laundry  independent    Shopping  independent       Mental Status    General Appearance WDL  WDL        Psychosocial    No documentation.       Abuse/Neglect    No documentation.       Legal     Row Name 02/11/19 1052       Financial/Legal    Who Manages Finances if Patient Unable  No advance directives.    Finance Comments  Ms. Hugo has prescription drug coverage with [employer].  Verified insurance with patient.        Substance Abuse    No documentation.       Patient Forms    No documentation.           Annemarie Carroll

## 2019-02-11 NOTE — PLAN OF CARE
Problem: Patient Care Overview  Goal: Plan of Care Review  Outcome: Ongoing (interventions implemented as appropriate)   02/11/19 1806   Coping/Psychosocial   Plan of Care Reviewed With patient   Plan of Care Review   Progress improving   OTHER   Outcome Summary Pt's pain relieved by po pain medications; has had 3 doses of prn Percocet this shift. Ambulates to the bathroom to urinate without difficulties. Plan is to d/c home today.

## 2019-02-11 NOTE — THERAPY DISCHARGE NOTE
Acute Care - Physical Therapy Treatment Note/Discharge   Lily     Patient Name: Elisa Hugo  : 1985  MRN: [id number]  Today's Date: 2019  Onset of Illness/Injury or Date of Surgery: 19  Date of Referral to PT: 19  Referring Physician: MD Stu    Admit Date: 2019    Visit Dx:    ICD-10-CM ICD-9-CM   1. Impaired functional mobility, balance, gait, and endurance Z74.09 V49.89   2. Impaired mobility and ADLs Z74.09 799.89   3. Intervertebral disc disorder M51.9 722.90   4. S/P lumbar discectomy L5-S1 RIGHT on 10/17/18 Z98.890 V45.89     Patient Active Problem List   Diagnosis   • Status post cervical spinal fusion   • Neck pain   • Gastro-esophageal reflux disease without esophagitis   • Intervertebral disc disorder   • Sciatica   • Sleep disorder   • Sciatica of right side   • S/P lumbar discectomy L5-S1 RIGHT on 10/17/18   • Herniation of intervertebral disc of lumbar spine       Physical Therapy Education     Title: PT OT SLP Therapies (Done)     Topic: Physical Therapy (Done)     Point: Mobility training (Done)     Learning Progress Summary           Patient Acceptance, E,D,H, VU,DU by LR at 2019  9:16 AM    Comment:  Educated on spinal precautions, HEP, correct log rolling technique, correct sit<->stand t/f technique, correct gait mechanics, correct stair training technique, and correct car t/f technique.    Acceptance, E,D, VU,NR by LR at 2/10/2019 11:06 AM    Comment:  Educated on spinal precautions, correct log rolling technique, correct sit<->stand t/f technique, correct gait mechanics, benefits of frequent ambulation, and progression of POC.    Acceptance, D,E,H, VU,NR by LR at 2019  9:44 AM    Comment:  Issued and reviewed written/illustrated HEP. Educated on spinal precautions, correct log rolling technique, correct sit<->stand t/f technique, correct gait mechanics, and progression of POC.                   Point: Home exercise program (Done)     Learning  Progress Summary           Patient Acceptance, E,D,H, VU,DU by LR at 2/11/2019  9:16 AM    Comment:  Educated on spinal precautions, HEP, correct log rolling technique, correct sit<->stand t/f technique, correct gait mechanics, correct stair training technique, and correct car t/f technique.    Acceptance, E,D, VU,NR by LR at 2/10/2019 11:06 AM    Comment:  Educated on spinal precautions, correct log rolling technique, correct sit<->stand t/f technique, correct gait mechanics, benefits of frequent ambulation, and progression of POC.    Acceptance, D,E,H, VU,NR by LR at 2/9/2019  9:44 AM    Comment:  Issued and reviewed written/illustrated HEP. Educated on spinal precautions, correct log rolling technique, correct sit<->stand t/f technique, correct gait mechanics, and progression of POC.                   Point: Body mechanics (Done)     Learning Progress Summary           Patient Acceptance, E,D,H, VU,DU by LR at 2/11/2019  9:16 AM    Comment:  Educated on spinal precautions, HEP, correct log rolling technique, correct sit<->stand t/f technique, correct gait mechanics, correct stair training technique, and correct car t/f technique.    Acceptance, E,D, VU,NR by LR at 2/10/2019 11:06 AM    Comment:  Educated on spinal precautions, correct log rolling technique, correct sit<->stand t/f technique, correct gait mechanics, benefits of frequent ambulation, and progression of POC.    Acceptance, D,E,H, VU,NR by LR at 2/9/2019  9:44 AM    Comment:  Issued and reviewed written/illustrated HEP. Educated on spinal precautions, correct log rolling technique, correct sit<->stand t/f technique, correct gait mechanics, and progression of POC.                   Point: Precautions (Done)     Learning Progress Summary           Patient Acceptance, E,D,H, VU,DU by LR at 2/11/2019  9:16 AM    Comment:  Educated on spinal precautions, HEP, correct log rolling technique, correct sit<->stand t/f technique, correct gait mechanics, correct  stair training technique, and correct car t/f technique.    Acceptance, E,D, VU,NR by LR at 2/10/2019 11:06 AM    Comment:  Educated on spinal precautions, correct log rolling technique, correct sit<->stand t/f technique, correct gait mechanics, benefits of frequent ambulation, and progression of POC.    Acceptance, D,E,H, VU,NR by LR at 2/9/2019  9:44 AM    Comment:  Issued and reviewed written/illustrated HEP. Educated on spinal precautions, correct log rolling technique, correct sit<->stand t/f technique, correct gait mechanics, and progression of POC.                               User Key     Initials Effective Dates Name Provider Type Discipline    LR 06/19/15 -  Michelle Mejias, PT Physical Therapist PT              Rehab Goal Summary     Row Name 02/11/19 0916             Physical Therapy Goals    Bed Mobility Goal Selection (PT)  bed mobility, PT goal 1  -LR      Transfer Goal Selection (PT)  transfer, PT goal 1  -LR      Gait Training Goal Selection (PT)  gait training, PT goal 1  -LR      Stairs Goal Selection (PT)  stairs, PT goal 1  -LR         Bed Mobility Goal 1 (PT)    Activity/Assistive Device (Bed Mobility Goal 1, PT)  sit to supine/supine to sit  -LR      [place] Level/Cues Needed (Bed Mobility Goal 1, PT)  conditional independence  -LR      Time Frame (Bed Mobility Goal 1, PT)  long term goal (LTG);5 days  -LR      Progress/Outcomes (Bed Mobility Goal 1, PT)  goal met  -LR         Transfer Goal 1 (PT)    Activity/Assistive Device (Transfer Goal 1, PT)  sit-to-stand/stand-to-sit;walker, rolling  -LR      [place] Level/Cues Needed (Transfer Goal 1, PT)  conditional independence  -LR      Time Frame (Transfer Goal 1, PT)  long term goal (LTG);5 days  -LR      Progress/Outcome (Transfer Goal 1, PT)  goal not met;discharged from facility  -LR         Gait Training Goal 1 (PT)    Activity/Assistive Device (Gait Training Goal 1, PT)  gait (walking locomotion);walker, rolling  -LR       [place] Level (Gait Training Goal 1, PT)  conditional independence  -LR      Distance (Gait Goal 1, PT)  500 feet  -LR      Time Frame (Gait Training Goal 1, PT)  long term goal (LTG);5 days  -LR      Progress/Outcome (Gait Training Goal 1, PT)  goal not met;discharged from facility  -LR         Stairs Goal 1 (PT)    Activity/Assistive Device (Stairs Goal 1, PT)  ascending stairs;descending stairs;step-to-step;walker, rolling;other (see comments) backwards  -LR      [place] Level/Cues Needed (Stairs Goal 1, PT)  contact guard assist  -LR      Number of Stairs (Stairs Goal 1, PT)  1  -LR      Time Frame (Stairs Goal 1, PT)  long term goal (LTG);5 days  -LR      Progress/Outcome (Stairs Goal 1, PT)  goal met  -LR        User Key  (r) = Recorded By, (t) = Taken By, (c) = Cosigned By    Initials Name Provider Type Discipline    LR Michelle Mejias, PT Physical Therapist PT        Therapy Treatment  Rehabilitation Treatment Summary     Row Name 02/11/19 0916             Treatment Time/Intention    Discipline  physical therapist  -LR      Document Type  therapy note (daily note);discharge evaluation/summary  -LR      Subjective Information  complains of;pain;fatigue  -LR      Mode of Treatment  physical therapy;individual therapy  -LR      Patient/Family Observations  Patient R sidelying in bed upon arrival.   -LR      Care Plan Review  care plan/treatment goals reviewed;risks/benefits reviewed;current/potential barriers reviewed;patient/other agree to care plan  -LR      Patient Effort  excellent  -LR      Existing Precautions/Restrictions  fall;spinal  -LR      Recorded by [LR] Michelle Mejias, PT 02/11/19 1020      Row Name 02/11/19 0916             Cognitive Assessment/Intervention- PT/OT    Orientation Status (Cognition)  oriented x 4  -LR      Follows Commands (Cognition)  WFL;follows one step commands;over 90% accuracy;verbal cues/prompting required  -LR      Safety Deficit (Cognitive)  other  (see comments) none  -LR      Recorded by [LR] Michelle Mejias, PT 02/11/19 1020      Row Name 02/11/19 0916             Safety Issues, Functional Mobility    Safety Issues Affecting Function (Mobility)  other (see comments) none  -LR      Recorded by [LR] Michelle Mejias, PT 02/11/19 1020      Row Name 02/11/19 0916             Bed Mobility Assessment/Treatment    Supine-Sit [place] (Bed Mobility)  verbal cues;conditional independence  -LR      Sit-Supine [place] (Bed Mobility)  verbal cues;conditional independence  -LR      Bed Mobility, Safety Issues  decreased use of legs for bridging/pushing  -LR      Assistive Device (Bed Mobility)  head of bed elevated;bed rails  -LR      Comment (Bed Mobility)  Verbal cues for correct log rolling technique for t/f in and out of bed.   -LR      Recorded by [LR] Michelle Mejias, PT 02/11/19 1020      Row Name 02/11/19 0916             Transfer Assessment/Treatment    Transfer Assessment/Treatment  sit-stand transfer;stand-sit transfer  -LR      Comment (Transfers)  Verbal cues for correct hand placement with t/f. Verbal cues to limit trunk flexion during t/f. Patient again stood with flexed knees, cues to extend to rise into full standing.   -LR      Recorded by [LR] Michelle Mejias, PT 02/11/19 1020      Row Name 02/11/19 0916             Sit-Stand Transfer    Sit-Stand [place] (Transfers)  verbal cues;stand by assist  -LR      Assistive Device (Sit-Stand Transfers)  walker, front-wheeled  -LR      Recorded by [LR] Michelle Mejias, PT 02/11/19 1020      Row Name 02/11/19 0916             Stand-Sit Transfer    Stand-Sit [place] (Transfers)  verbal cues;stand by assist  -LR      Assistive Device (Stand-Sit Transfers)  walker, front-wheeled  -LR      Recorded by [LR] Michelle Mejias, PT 02/11/19 1020      Row Name 02/11/19 0916             Gait/Stairs Assessment/Training    39886 - Gait Training Minutes   12  -LR       [place] Level (Gait)  verbal cues;contact guard  -LR      Assistive Device (Gait)  walker, front-wheeled  -LR      Distance in Feet (Gait)  230  -LR      Pattern (Gait)  step-through  -LR      Deviations/Abnormal Patterns (Gait)  bilateral deviations;ranjan decreased;gait speed decreased;stride length decreased;base of support, narrow  -LR      Bilateral Gait Deviations  forward flexed posture;heel strike decreased  -LR      Negotiation (Stairs)  stairs independence;stairs assistive device;handrail location;number of steps;ascending technique;descending technique  -LR      [place] Level (Stairs)  verbal cues;contact guard  -LR      Assistive Device (Stairs)  walker, front-wheeled  -LR      Handrail Location (Stairs)  none  -LR      Number of Steps (Stairs)  1  -LR      Ascending Technique (Stairs)  step-to-step  -LR      Descending Technique (Stairs)  step-to-step  -LR      Stairs, Impairments  pain  -LR      Comment (Gait/Stairs)  Patient ambulated with step through gait pattern at slow pace. Verbal cues for increased LE weight bearing, decreased UE weight bearing, increased step length, and upright posture. Improved with cues for correction. Gait limited by pain. Verbal cues to back up to step with RW and to use RW for UE support. Verbal cues to step up backwards with strong LE first and down with weak LE first.   -LR      Recorded by [LR] Michelle Mejias, PT 02/11/19 1020      Row Name 02/11/19 0916             Therapeutic Exercise    45220 - PT Therapeutic Exercise Minutes  13  -LR      Recorded by [LR] Michelle Mejias, PT 02/11/19 1020      Row Name 02/11/19 0916             Therapeutic Exercise    Lower Extremity (Therapeutic Exercise)  gluteal sets;heel slides, bilateral;quad sets, bilateral  -LR      Lower Extremity Range of Motion (Therapeutic Exercise)  hip internal/external rotation, bilateral;ankle dorsiflexion/plantar flexion, bilateral  -LR      Core Strength (Therapeutic  Exercise)  abdominal bracing;other (see comments) ab sets, arms overhead  -LR      Exercise Type (Therapeutic Exercise)  AROM (active range of motion);isometric contraction, static;isotonic contraction, concentric;AAROM (active assistive range of motion)  -LR      Position (Therapeutic Exercise)  supine  -LR      Sets/Reps (Therapeutic Exercise)  x10 reps each  -LR      Comment (Therapeutic Exercise)  cues for technique; min assist R Heel slides, demonstrated BKFOs for patient to start tomorrow.   -LR      Recorded by [LR] Michelle Mejias, PT 02/11/19 1020      Row Name 02/11/19 0916             Positioning and Restraints    Pre-Treatment Position  in bed  -LR      Post Treatment Position  bed  -LR      In Bed  notified nsg;supine;call light within reach;encouraged to call for assist;side rails up x2  -LR      Recorded by [LR] Michelle Mejias, PT 02/11/19 1020      Row Name 02/11/19 0916             Pain Assessment    Additional Documentation  Pain Scale: Numbers Pre/Post-Treatment (Group)  -LR      Recorded by [LR] Michelle Mejias, PT 02/11/19 1020      Row Name 02/11/19 0916             Pain Scale: Numbers Pre/Post-Treatment    Pain Scale: Numbers, Pretreatment  9/10  -LR      Pain Scale: Numbers, Post-Treatment  10/10  -LR      Pain Location - Orientation  lower  -LR      Pain Location  back  -LR      Pre/Post Treatment Pain Comment  down R LE  -LR      Pain Intervention(s)  Repositioned;Ambulation/increased activity  -LR      Recorded by [LR] Michelle Mejias, PT 02/11/19 1020      Row Name                Wound 02/08/19 1808 Right back incision    Wound - Properties Group Date first assessed: 02/08/19 [EH] Time first assessed: 1808 [EH] Side: Right [EH] Location: back [EH] Type: incision [EH] Recorded by:  [EH] Tatyana Hernandez RN 02/08/19 1808    Row Name 02/11/19 0916             Coping    Observed Emotional State  accepting;cooperative  -LR      Verbalized Emotional State  acceptance   -LR      Recorded by [LR] MejiasRodrigo pinedakaren Awan, PT 02/11/19 1020      Row Name 02/11/19 0916             Plan of Care Review    Plan of Care Reviewed With  patient  -LR      Recorded by [LR] Mejias, Michelle Awan, PT 02/11/19 1020      Row Name 02/11/19 0916             Outcome Summary/Treatment Plan (PT)    Daily Summary of Progress (PT)  progress toward functional goals as expected  -LR      Recorded by [LR] Mejias, Michelle Awan, PT 02/11/19 1020        User Key  (r) = Recorded By, (t) = Taken By, (c) = Cosigned By    Initials Name Effective Dates Discipline    LR Michelle Mejias, PT 06/19/15 -  PT    Tatyana Leach RN 08/17/17 -  Nurse        Wound 02/08/19 1808 Right back incision (Active)   Dressing Appearance dry;intact;no drainage 2/11/2019  8:00 AM       PT Recommendation and Plan  Anticipated Discharge Disposition (PT): home with assist, home with home health  Planned Therapy Interventions (PT Eval): balance training, bed mobility training, gait training, home exercise program, patient/family education, ROM (range of motion), stair training, strengthening, transfer training  Therapy Frequency (PT Clinical Impression): daily  Outcome Summary/Treatment Plan (PT)  Daily Summary of Progress (PT): progress toward functional goals as expected  Anticipated Equipment Needs at Discharge (PT): front wheeled walker  Anticipated Discharge Disposition (PT): home with assist, home with home health  Plan of Care Reviewed With: patient  Progress: improving  Outcome Summary: Patient ambulated 230 feet with step through gait pattern, limited by pain. Climbed one step backwards with RW with no difficulty. Progressed HEP today. Patient has been d/c home with family.     Outcome Measures     Row Name 02/11/19 0916 02/10/19 1106 02/10/19 0942       How much help from another person do you currently need...    Turning from your back to your side while in flat bed without using bedrails?  4  -LR  4  -LR  --    Moving  from lying on back to sitting on the side of a flat bed without bedrails?  4  -LR  3  -LR  --    Moving to and from a bed to a chair (including a wheelchair)?  3  -LR  3  -LR  --    Standing up from a chair using your arms (e.g., wheelchair, bedside chair)?  3  -LR  3  -LR  --    Climbing 3-5 steps with a railing?  3  -LR  2  -LR  --    To walk in hospital room?  3  -LR  3  -LR  --    AM-PAC 6 Clicks Score  20  -LR  18  -LR  --       How much help from another is currently needed...    Putting on and taking off regular lower body clothing?  --  --  3  -TB    Bathing (including washing, rinsing, and drying)  --  --  3  -TB    Toileting (which includes using toilet bed pan or urinal)  --  --  3  -TB    Putting on and taking off regular upper body clothing  --  --  3  -TB    Taking care of personal grooming (such as brushing teeth)  --  --  3  -TB    Eating meals  --  --  4  -TB    Score  --  --  19  -TB       Functional Assessment    Outcome Measure Options  AM-PAC 6 Clicks Basic Mobility (PT)  -LR  AM-PAC 6 Clicks Basic Mobility (PT)  -LR  AM-PAC 6 Clicks Daily Activity (OT)  -TB    Row Name 02/09/19 0944 02/09/19 0943          How much help from another person do you currently need...    Turning from your back to your side while in flat bed without using bedrails?  3  -LR  --     Moving from lying on back to sitting on the side of a flat bed without bedrails?  3  -LR  --     Moving to and from a bed to a chair (including a wheelchair)?  3  -LR  --     Standing up from a chair using your arms (e.g., wheelchair, bedside chair)?  3  -LR  --     Climbing 3-5 steps with a railing?  3  -LR  --     To walk in hospital room?  3  -LR  --     AM-PAC 6 Clicks Score  18  -LR  --        How much help from another is currently needed...    Putting on and taking off regular lower body clothing?  --  2  -TB     Bathing (including washing, rinsing, and drying)  --  3  -TB     Toileting (which includes using toilet bed pan or urinal)   --  3  -TB     Putting on and taking off regular upper body clothing  --  3  -TB     Taking care of personal grooming (such as brushing teeth)  --  3  -TB     Eating meals  --  4  -TB     Score  --  18  -TB        Functional Assessment    Outcome Measure Options  AM-PAC 6 Clicks Basic Mobility (PT)  -LR  AM-PAC 6 Clicks Daily Activity (OT)  -TB       User Key  (r) = Recorded By, (t) = Taken By, (c) = Cosigned By    Initials Name Provider Type    TB Leana Gonsalves, OT Occupational Therapist    LR Michelle Mejias, PT Physical Therapist           Time Calculation:   PT Charges     Row Name 02/11/19 0916             Time Calculation    Start Time  0916  -LR      PT Received On  02/11/19  -LR      PT Goal Re-Cert Due Date  02/19/19  -LR         Time Calculation- PT    Total Timed Code Minutes- PT  25 minute(s)  -LR         Timed Charges    84566 - PT Therapeutic Exercise Minutes  13  -LR      58257 - Gait Training Minutes   12  -LR        User Key  (r) = Recorded By, (t) = Taken By, (c) = Cosigned By    Initials Name Provider Type    Michelle Hernandez, PT Physical Therapist        Therapy Suggested Charges     Code   Minutes Charges    36219 (CPT®) Hc Pt Neuromusc Re Education Ea 15 Min      06431 (CPT®) Hc Pt Ther Proc Ea 15 Min 13 1    30940 (CPT®) Hc Gait Training Ea 15 Min 12 1    07385 (CPT®) Hc Pt Therapeutic Act Ea 15 Min      54146 (CPT®) Hc Pt Manual Therapy Ea 15 Min      87121 (CPT®) Hc Pt Iontophoresis Ea 15 Min      24945 (CPT®) Hc Pt Elec Stim Ea-Per 15 Min      13561 (CPT®) Hc Pt Ultrasound Ea 15 Min      72342 (CPT®) Hc Pt Self Care/Mgmt/Train Ea 15 Min      92499 (CPT®) Hc Pt Prosthetic (S) Train Initial Encounter, Each 15 Min      59035 (CPT®) Hc Pt Orthotic(S)/Prosthetic(S) Encounter, Each 15 Min      59446 (CPT®) Hc Orthotic(S) Mgmt/Train Initial Encounter, Each 15min      Total  25 2          Therapy Charges for Today     Code Description Service Date Service Provider  Modifiers Qty    63376816636 HC PT THER PROC EA 15 MIN 2/10/2019 Michelle Mejias, PT GP 1    64928637186 HC PT THER PROC EA 15 MIN 2/11/2019 Michelle Mejias, PT GP 1    44812439575 HC GAIT TRAINING EA 15 MIN 2/11/2019 Michelle Mejias, PT GP 1          PT G-Codes  Outcome Measure Options: AM-PAC 6 Clicks Basic Mobility (PT)  AM-PAC 6 Clicks Score: 20  Score: 19    PT Discharge Summary  Anticipated Discharge Disposition (PT): home with assist, home with home health  Reason for Discharge: Discharge from facility  Outcomes Achieved: Patient able to partially acheive established goals  Discharge Destination: Home with assist, Home with home health    Michelle Mejias, PT  2/11/2019

## 2019-02-11 NOTE — PAYOR COMM NOTE
"Mildred Parra RN  Utilization Review  P: [phone]    Updated clinicals  Pre-cert # [id number]        Elisa Griffith (33 y.o. Female)     Date of Birth Social Security Number Address Home Phone MRN    1985  [address] [address] [phone] [id number]    [employer] Marital Status          Unknown Single       Admission Date Admission Type Admitting Provider Attending Provider Department, Room/Bed    2/8/19 Elective César Peraza MD Bean, James R, MD [facility] 3G, S357/1    Discharge Date Discharge Disposition Discharge Destination         Home or Self Care              Attending Provider:  César Peraza MD    Allergies:  Asa [Aspirin], Bean Pod Extract, Diphth-acell Pertussis-tetanus, Penicillins, Soybean-containing Drug Products, Strawberry Flavor    Isolation:  None   Infection:  None   Code Status:  CPR    Ht:  162.6 cm (64.02\")   Wt:  64.2 kg (141 lb 8.6 oz)    Admission Cmt:  None   Principal Problem:  Herniation of intervertebral disc of lumbar spine [M51.26] More...                 Active Insurance as of 2/8/2019     Primary Coverage     Payor Plan Insurance Group Employer/Plan Group    [employer] [place] [employer] [place]      Payor Plan Address Payor Plan Phone Number Payor Plan Fax Number Effective Dates    [address] [address]   11/1/2016 - None Entered    [place] [address]       Subscriber Name Subscriber Birth Date Member ID       ELISA GRIFFITH 1985 [id number]                 Emergency Contacts      (Rel.) Home Phone Work Phone Mobile Phone    MIRTA Santos (Mother) -- -- [phone]               Physician Progress Notes (last 72 hours) (Notes from 2/8/2019  9:41 AM through 2/11/2019  9:41 AM)      Yaya Mckenna MD at 2/10/2019  7:11 AM                   FOLLOW UP ENCOUNTER          CHIEF COMPLAINT::   POD 2: PLIF L5-S1                        MEDICAL HISTORY SINCE LAST ENCOUNTER :       She has done " reasonably well.  Complaining of incisional pain as anticipated.  She has been ambulatory although requiring encouragement.                                    ASSESSMENT/DISPOSITION :      1.  PCA to be discontinued today per orders.    2.  Initiated baclofen 10 mg for muscle spasm.    3.  Will discharge in next 1-2 days.          Electronically signed by Yaya Mckenna MD at 2/10/2019  7:12 AM     César Peraza MD at 2/9/2019  6:48 AM        NEUROSURGERY PROGRESS NOTE    Vital Signs  Blood pressure 91/46, pulse 73, temperature 97.4 °F (36.3 °C), temperature source Temporal, resp. rate 16, SpO2 96 %, not currently breastfeeding.    Interval History:   POD #1: L5-S1 right TLIF with pedicle screw fixation for recurrent disc herniation.    Complains of right-sided back pain as expected.  Motor intact in the lower extremities.    Plan to ambulate today.  PT today.  Lumbar x-rays today to confirm hardware placement.  Continued PCA and IV for now.  Blood pressure low, but pulse normal, had very little blood loss during surgery, so hypovolemia unlikely.      ASSESSMENT: POD #1: L5-S1 right TLIF with pedicle screw fixation.  Doing well.    PLAN: DC PCA tomorrow.  Discharge home Monday.    César Peraza MD  02/09/19  6:48 AM      Electronically signed by César Peraza MD at 2/9/2019  6:50 AM       Consult Notes (last 72 hours) (Notes from 2/8/2019  9:41 AM through 2/11/2019  9:41 AM)     No notes of this type exist for this encounter.

## 2019-02-11 NOTE — PLAN OF CARE
Problem: Patient Care Overview  Goal: Plan of Care Review  Outcome: Ongoing (interventions implemented as appropriate)   02/11/19 0916   Coping/Psychosocial   Plan of Care Reviewed With patient   Plan of Care Review   Progress improving   OTHER   Outcome Summary Patient ambulated 230 feet with step through gait pattern, limited by pain. Climbed one step backwards with RW with no difficulty. Progressed HEP today. Patient has been d/c home with family.

## 2019-02-12 NOTE — PAYOR COMM NOTE
"Elisa Griffith (33 y.o. Female)     Date of Birth Social Security Number Address Home Phone MRN    1985  [address] [id number] [phone] [id number]    Rastafari Marital Status          Unknown Single       Admission Date Admission Type Admitting Provider Attending Provider Department, Room/Bed    2/8/19 Elective César Peraza MD  [facility] 3G, S357/1    Discharge Date Discharge Disposition Discharge Destination        2/11/2019 Home or Self Care              Attending Provider:  (none)   Allergies:  Asa [Aspirin], Bean Pod Extract, Diphth-acell Pertussis-tetanus, Penicillins, Soybean-containing Drug Products, Strawberry Flavor    Isolation:  None   Infection:  None   Code Status:  Prior    Ht:  162.6 cm (64.02\")   Wt:  64.2 kg (141 lb 8.6 oz)    Admission Cmt:  None   Principal Problem:  Herniation of intervertebral disc of lumbar spine [M51.26] More...                 Active Insurance as of 2/8/2019     Primary Coverage     Payor Plan Insurance Group Employer/Plan Group    [employer] [facility] [employer] [facility]      Payor Plan Address Payor Plan Phone Number Payor Plan Fax Number Effective Dates    [address] [address]   11/1/2016 - None Entered    [place] [address]       Subscriber Name Subscriber Birth Date Member ID       ELISA GRIFFITH 1985 [id number]                 Emergency Contacts      (Rel.) Home Phone Work Phone Mobile Phone    MIRTA Santos (Mother) -- -- [phone]               Discharge Summary      Irina Damon PA-C at 2/11/2019 10:50 AM          DISCHARGE SUMMARY    Date of Admission: 2/8/2019    Date of Discharge:  2/11/2019    Discharge Diagnosis: Recurrent disc herniation L5-S1 right, sciatica    Procedures Performed:  Procedure(s):  LUMBAR LAMINECTOMY POSTERIOR LUMBAR INTERBODY FUSION RIGHT L5-S1      Presenting Problem/History of Present Illness  Herniation of intervertebral disc of lumbar spine " [M51.26]  Herniation of intervertebral disc of lumbar spine [M51.26]     Hospital Course  Patient is a 33 y.o. female with a history of lumbar discectomy at L5-S1 right originally on 10/17/2018.  6 weeks following the procedure, she developed recurrent pain in her right hip and leg that had become intractable.  The pain and radicular symptoms were nonresponsive to exercise, steroids, muscle relaxers, or therapy.  She underwent a lumbar myelogram on 12/18/2018 which revealed a recurrent disc herniation at L5-S1 on the right.  At this point she had also developed absent right ankle jerk and numbness of the right lateral foot.  Due to the recurrence of symptoms and disc herniation so early after surgery, she was scheduled for discectomy L5-S1 with inclusion of interbody fusion and pedicle screws on 2/8/2019.  Surgery was uncomplicated and she remained inpatient for 3 nights.  She admitted to incisional pain which was anticipated following this procedure.  She was ambulatory with physical therapy with some pain.  She was voiding independently and tolerating oral intake.     Discharge Medications     Discharge Medications      New Medications      Instructions Start Date   ondansetron 4 MG tablet  Commonly known as:  ZOFRAN   4 mg, Oral, Every 6 Hours PRN         Changes to Medications      Instructions Start Date   methocarbamol 500 MG tablet  Commonly known as:  ROBAXIN  What changed:    · medication strength  · how much to take   1,000 mg, Oral, 3 Times Daily         Continue These Medications      Instructions Start Date   cetirizine 10 MG tablet  Commonly known as:  zyrTEC   10 mg, Oral, Nightly      diphenhydrAMINE 25 mg capsule  Commonly known as:  BENADRYL   25 mg, Oral, Every 6 Hours PRN      VENTOLIN  (90 Base) MCG/ACT inhaler  Generic drug:  albuterol sulfate HFA   2 puffs, Inhalation, Every 4 Hours PRN         Stop These Medications    cefdinir 300 MG capsule  Commonly known as:  OMNICEF      chlorhexidine 4 % external liquid  Commonly known as:  HIBICLENS     HYDROcodone-acetaminophen 5-325 MG per tablet  Commonly known as:  NORCO     raNITIdine 150 MG tablet  Commonly known as:  ZANTAC          Disposition:  Ms. Hugo is stable for discharge home today. Over the course of her hospital stay, vitals remained stable and lumbar incision site remained clean, dry and intact. Motor and sensory function were found to be intact throughout. She was ambulatory, voiding independently, and tolerating oral intake. Patient was doing well at the time of discharge.  She will follow up with our office in approximately 4 weeks for her first postop visit.  She was instructed to contact our office if she has any concerns in the interim.   Irina Damon PA-C  02/11/19  10:51 AM    Electronically signed by César Perzaa MD at 2/12/2019  8:07 AM

## 2019-02-18 ENCOUNTER — TELEPHONE (OUTPATIENT)
Dept: NEUROSURGERY | Facility: CLINIC | Age: 34
End: 2019-02-18

## 2019-02-18 NOTE — TELEPHONE ENCOUNTER
Provider: Stu   Caller: self  Time of call: 10:24    Phone #:  [phone]  Surgery:LUMBAR LAMINECTOMY POSTERIOR LUMBAR INTERBODY FUSION RIGHT L5-S1     Surgery Date: 2/8/19   Last visit:   1/3/19  Next visit: 3/7/19, Elsi SILVA:         Reason for call:       Her mom and daughter both have the flu and are taking Tamiflu. She wants to know if she needs to also as a precaution since she just had surgery.    Please advise

## 2019-02-18 NOTE — TELEPHONE ENCOUNTER
Called patient and let her know it is not necessary from our standpoint, although if prescribed Tamiflu from another provider due to being in close contact with her daughter she is ok to take it.

## 2019-02-21 RX ORDER — HYDROCODONE BITARTRATE AND ACETAMINOPHEN 7.5; 325 MG/1; MG/1
1 TABLET ORAL EVERY 8 HOURS PRN
Qty: 45 TABLET | Refills: 0 | Status: SHIPPED | OUTPATIENT
Start: 2019-02-21 | End: 2019-03-25 | Stop reason: DRUGHIGH

## 2019-02-21 NOTE — TELEPHONE ENCOUNTER
Provider:  Stu  Caller: jarvis  Time of call:   3:01  Phone #:  [phone]  Surgery:  Lumbar laminectomy posterior interbody fusion L5-S1  Surgery Date:02/08/19    Last visit:   same  Next visit: 03/07/19    SHIRA:     01/10/2019 Hydrocodone/Acetaminophen  325MG/5MG  1985 15 5 RUTH BARRETT [employer]  [place] [facility] 15 1  01/16/2019 Hydrocodone/Acetaminophen  325MG/5MG  1985 15 5 MOLLY ROMANO [place] [employer]  [place] [facility] 15 1  02/11/2019 Hydrocodone/Acetaminophen  325MG/7.5MG  1985 45 11 RUTH BARRETT [place] [employer]  [place] [facility] 31 1    Reason for call:     Patient called and requests a refill on Norco 7.5 mg.

## 2019-02-21 NOTE — TELEPHONE ENCOUNTER
Notified patient that her refill was approved and signed. She will  in office tomorrow morning.  Placed up front in rx bag.

## 2019-03-11 ENCOUNTER — OFFICE VISIT (OUTPATIENT)
Dept: NEUROSURGERY | Facility: CLINIC | Age: 34
End: 2019-03-11

## 2019-03-11 VITALS
DIASTOLIC BLOOD PRESSURE: 64 MMHG | TEMPERATURE: 97.6 F | HEIGHT: 64 IN | WEIGHT: 147 LBS | SYSTOLIC BLOOD PRESSURE: 142 MMHG | BODY MASS INDEX: 25.1 KG/M2

## 2019-03-11 DIAGNOSIS — M51.26 HERNIATION OF INTERVERTEBRAL DISC OF LUMBAR SPINE: Primary | ICD-10-CM

## 2019-03-11 DIAGNOSIS — Z98.1 S/P LUMBAR FUSION: ICD-10-CM

## 2019-03-11 PROCEDURE — 99024 POSTOP FOLLOW-UP VISIT: CPT | Performed by: PHYSICIAN ASSISTANT

## 2019-03-11 NOTE — PROGRESS NOTES
Patient: Elisa Hugo  : 1985  Gender: female    Primary Care Provider: Mirella Vasquez APRN      Chief Complaint:   Chief Complaint   Patient presents with   • Post-op       History of Present Illness:   Patient is a 33-year-old female who is well-known to Dr. Peraza's practice.  Her history is significant for a recurrent lumbar disc herniation at L5-S1, previously undergoing a lumbar discectomy at this level in 2018.  6 weeks following this procedure she developed recurrent pain in her right hip and legs which was nonresponsive to conservative measures.  Lumbar myelogram revealed a recurrent disc herniation at L5-S1 on the right.  Due to this recurrence which was soon after the initial operation, she ultimately elected for a posterior lumbar interbody fusion with pedicle screw fixation at L5-S1.  Patient underwent this operation on 2019   She presents today approximately 4 weeks postop.  She admits to ongoing lower extremity paresthesias as well as occasional right leg pain.  She states that she is ambulating much better than before.  She denies any new symptoms or bowel or bladder dysfunction.  She denies incisional problems.  She is taking Robaxin and Norco as needed for pain.      Past Medical and Surgical History:  Past Medical History:   Diagnosis Date   • Asthma    • GERD (gastroesophageal reflux disease)    • Headache    • History of seizure as     • History of staph infection 2008    right leg after delivery of daughter; po meds only     • Hx of migraines    • Lower back pain    • Lumbar herniated disc    • MRSA infection     history of   • Stomach ulcer      Past Surgical History:   Procedure Laterality Date   • ANTERIOR CERVICAL DISCECTOMY W/ FUSION Bilateral 2018    Procedure: CERVICAL DISCECTOMY ANTERIOR WITH FUSION C6-7  BILATERAL;  Surgeon: César Peraza MD;  Location: [facility];  Service: Neurosurgery   • INTERVENTIONAL RADIOLOGY PROCEDURE N/A 2018     Procedure: myelogram cervical spine ;  Surgeon: Abdi Santana MD;  Location:  [facility];  Service:    • INTERVENTIONAL RADIOLOGY PROCEDURE N/A 9/6/2018    Procedure: myelogram lumbar spine;  Surgeon: Abdi Santana MD;  Location:  [facility];  Service: Interventional Radiology   • INTERVENTIONAL RADIOLOGY PROCEDURE N/A 12/18/2018    Procedure: IR myelogram lumbar spine;  Surgeon: Abdi Santana MD;  Location:  [facility];  Service: Interventional Radiology   • LUMBAR DISCECTOMY Right 10/17/2018    Procedure: LUMBAR DISCECTOMY L5-S1 RIGHT, MINIMALLY INVASIVE;  Surgeon: César Peraza MD;  Location:  [facility];  Service: Neurosurgery   • LUMBAR LAMINECTOMY WITH FUSION Right 2/8/2019    Procedure: LUMBAR LAMINECTOMY POSTERIOR LUMBAR INTERBODY FUSION RIGHT L5-S1;  Surgeon: César Peraza MD;  Location:  [facility];  Service: Neurosurgery   • SINUS SURGERY         Current Medications:    Current Outpatient Medications:   •  cetirizine (zyrTEC) 10 MG tablet, Take 10 mg by mouth Every Night., Disp: , Rfl:   •  diphenhydrAMINE (BENADRYL) 25 mg capsule, Take 25 mg by mouth Every 6 (Six) Hours As Needed for Itching., Disp: , Rfl:   •  HYDROcodone-acetaminophen (NORCO) 7.5-325 MG per tablet, Take 1 tablet by mouth Every 8 (Eight) Hours As Needed for Moderate Pain ., Disp: 45 tablet, Rfl: 0  •  methocarbamol (ROBAXIN) 500 MG tablet, Take 2 tablets by mouth 3 (Three) Times a Day., Disp: 90 tablet, Rfl: 1  •  ondansetron (ZOFRAN) 4 MG tablet, Take 1 tablet by mouth Every 6 (Six) Hours As Needed for Nausea or Vomiting., Disp: 20 tablet, Rfl: 0  •  VENTOLIN  (90 Base) MCG/ACT inhaler, Inhale 2 puffs Every 4 (Four) Hours As Needed for Wheezing., Disp: , Rfl:     Allergies:  Allergies   Allergen Reactions   • Asa [Aspirin] GI Intolerance and Unknown (See Comments)     Gi upset    • Bean Pod Extract GI Intolerance     MILK AND SOY   • Diphth-Acell Pertussis-Tetanus  "Unknown (See Comments)     \" legs swelling\" happened when was an infant    • Penicillins GI Intolerance   • Soybean-Containing Drug Products GI Intolerance     All soy   • Strawberry Flavor GI Intolerance         Review of Systems   Musculoskeletal: Positive for back pain.   Neurological: Positive for numbness.   All other systems reviewed and are negative.        Physical Exam  NEUROLOGICAL:  Mental status:  Alert and oriented.  Speech intact.  Recent and remote memory intact.     Musculoskeletal: Strength and range of motion equal and intact bilaterally.  Mild weakness with right hip flexion compared to left.    Sensation: Intact to touch upper and lower extremities.  Position sense intact.    Skin: Lumbar incisions are well intact without erythema, fluctuance, drainage.    Vitals:    03/11/19 1348   BP: 142/64   Temp: 97.6 °F (36.4 °C)   TempSrc: Temporal   Weight: 66.7 kg (147 lb)   Height: 162.6 cm (64\")           Assessment:  Recurrent lumbar disc herniation L5-S1 on the right, status post posterior lumbar interbody fusion L5-S1 on 2/8/2019    Plan:  Ms. Hugo was seen today in a postoperative visit.  Overall she is doing well postoperatively.  She does have residual lower extremity paresthesias with right leg pain but this does not appear to be as severe as it was prior to surgery.  She is ambulating without difficulty. I assured her that although these symptoms are present, she is still early in her postop period and will continue to see improvement. She will begin PT in a couple of weeks. I have refilled her pain medication.  I would like to see her back in 4 weeks with lumbar x-rays.  She was advised to contact our office with any concerns in the meantime.    Follow Up:  4 weeks    Irina Damon PA-C  "

## 2019-03-13 ENCOUNTER — DOCUMENTATION (OUTPATIENT)
Dept: NEUROSURGERY | Facility: CLINIC | Age: 34
End: 2019-03-13

## 2019-03-25 RX ORDER — HYDROCODONE BITARTRATE AND ACETAMINOPHEN 5; 325 MG/1; MG/1
1 TABLET ORAL EVERY 8 HOURS PRN
Qty: 30 TABLET | Refills: 0 | Status: SHIPPED | OUTPATIENT
Start: 2019-03-25 | End: 2019-04-16 | Stop reason: SDUPTHER

## 2019-03-25 RX ORDER — HYDROCODONE BITARTRATE AND ACETAMINOPHEN 7.5; 325 MG/1; MG/1
1 TABLET ORAL EVERY 8 HOURS PRN
Qty: 45 TABLET | Refills: 0 | OUTPATIENT
Start: 2019-03-25

## 2019-03-25 NOTE — TELEPHONE ENCOUNTER
Provider: Stu   Caller: self  Time of call:  9:35   Phone #:  [phone]  Surgery: LUMBAR LAMINECTOMY POSTERIOR LUMBAR INTERBODY FUSION RIGHT L5-S1    Surgery Date:  2/8/19  Last visit: 3/11/19    Next visit: 4/8/19    SHIRA:    02/11/2019 Hydrocodone/Acetaminophen  325MG/7.5MG  1985 45 11 RUTH BARRETT [employer] [employer]  [facility] [place] 31 1  02/22/2019 Hydrocodone/Acetaminophen  325MG/7.5MG  1985 45 15 MOLLY ROMANO [employer] [place] 23 1  03/11/2019 Hydrocodone/Acetaminophen  325MG/7.5MG  1985 30 10 MOLLY ROMANO [employer], [employer].  [facility] [place] 23 1        Reason for call:       Requested Prescriptions     Pending Prescriptions Disp Refills   • HYDROcodone-acetaminophen (NORCO) 7.5-325 MG per tablet 45 tablet 0     Sig: Take 1 tablet by mouth Every 8 (Eight) Hours As Needed for Moderate Pain .

## 2019-04-08 ENCOUNTER — OFFICE VISIT (OUTPATIENT)
Dept: NEUROSURGERY | Facility: CLINIC | Age: 34
End: 2019-04-08

## 2019-04-08 ENCOUNTER — HOSPITAL ENCOUNTER (OUTPATIENT)
Dept: GENERAL RADIOLOGY | Facility: HOSPITAL | Age: 34
Discharge: HOME | End: 2019-04-08
Admitting: PHYSICIAN ASSISTANT

## 2019-04-08 VITALS
BODY MASS INDEX: 25.1 KG/M2 | SYSTOLIC BLOOD PRESSURE: 100 MMHG | TEMPERATURE: 97.8 F | DIASTOLIC BLOOD PRESSURE: 60 MMHG | HEIGHT: 64 IN | WEIGHT: 147 LBS

## 2019-04-08 DIAGNOSIS — Z98.1 S/P LUMBAR FUSION: Primary | ICD-10-CM

## 2019-04-08 DIAGNOSIS — M51.26 HERNIATION OF INTERVERTEBRAL DISC OF LUMBAR SPINE: ICD-10-CM

## 2019-04-08 PROCEDURE — 99024 POSTOP FOLLOW-UP VISIT: CPT | Performed by: PHYSICIAN ASSISTANT

## 2019-04-08 PROCEDURE — 72100 X-RAY EXAM L-S SPINE 2/3 VWS: CPT

## 2019-04-08 RX ORDER — METHOCARBAMOL 500 MG/1
1000 TABLET, FILM COATED ORAL 3 TIMES DAILY
Qty: 90 TABLET | Refills: 1 | Status: SHIPPED | OUTPATIENT
Start: 2019-04-08 | End: 2021-10-05

## 2019-04-08 NOTE — PROGRESS NOTES
Patient: Elisa Hugo  : 1985  Gender: female    Primary Care Provider: Mirella Vasquez APRN      Chief Complaint:   Chief Complaint   Patient presents with   • Post-op       History of Present Illness:  Ms. Hugo is a 33-year-old female who presents today for a postoperative visit.  She is status post lumbar fusion L5-S1 on 2019.  Her postoperative course has been uncomplicated and she states that she is making improvement.  She does admit to ongoing low back and lower extremity pain but denies any new weakness, numbness, paresthesias.  She denies bowel or bladder dysfunction she does admit to new left knee pain today.  Overall, she is ambulating much better than before.  She continues to take Norco and Robaxin for pain.      Past Medical and Surgical History:  Past Medical History:   Diagnosis Date   • Asthma    • GERD (gastroesophageal reflux disease)    • Headache    • History of seizure as     • History of staph infection 2008    right leg after delivery of daughter; po meds only     • Hx of migraines    • Lower back pain    • Lumbar herniated disc    • MRSA infection     history of   • Stomach ulcer      Past Surgical History:   Procedure Laterality Date   • ANTERIOR CERVICAL DISCECTOMY W/ FUSION Bilateral 2018    Procedure: CERVICAL DISCECTOMY ANTERIOR WITH FUSION C6-7  BILATERAL;  Surgeon: César Peraza MD;  Location: [facility];  Service: Neurosurgery   • INTERVENTIONAL RADIOLOGY PROCEDURE N/A 2018    Procedure: myelogram cervical spine ;  Surgeon: Abdi Santana MD;  Location:  [facility];  Service:    • INTERVENTIONAL RADIOLOGY PROCEDURE N/A 2018    Procedure: myelogram lumbar spine;  Surgeon: Abdi Santana MD;  Location:  [facility];  Service: Interventional Radiology   • INTERVENTIONAL RADIOLOGY PROCEDURE N/A 2018    Procedure: IR myelogram lumbar spine;  Surgeon: Abdi Santana MD;  Location:  [facility]  "[facility]ATION;  Service: Interventional Radiology   • LUMBAR DISCECTOMY Right 10/17/2018    Procedure: LUMBAR DISCECTOMY L5-S1 RIGHT, MINIMALLY INVASIVE;  Surgeon: César Peraza MD;  Location:  [facility];  Service: Neurosurgery   • LUMBAR LAMINECTOMY WITH FUSION Right 2/8/2019    Procedure: LUMBAR LAMINECTOMY POSTERIOR LUMBAR INTERBODY FUSION RIGHT L5-S1;  Surgeon: César Peraza MD;  Location:  [facility];  Service: Neurosurgery   • SINUS SURGERY         Current Medications:    Current Outpatient Medications:   •  raNITIdine HCl (ZANTAC PO), Take 150 mg by mouth 2 (Two) Times a Day., Disp: , Rfl:   •  cetirizine (zyrTEC) 10 MG tablet, Take 10 mg by mouth Every Night., Disp: , Rfl:   •  diphenhydrAMINE (BENADRYL) 25 mg capsule, Take 25 mg by mouth Every 6 (Six) Hours As Needed for Itching., Disp: , Rfl:   •  HYDROcodone-acetaminophen (NORCO) 5-325 MG per tablet, Take 1 tablet by mouth Every 8 (Eight) Hours As Needed for Moderate Pain ., Disp: 30 tablet, Rfl: 0  •  methocarbamol (ROBAXIN) 500 MG tablet, Take 2 tablets by mouth 3 (Three) Times a Day., Disp: 90 tablet, Rfl: 1  •  ondansetron (ZOFRAN) 4 MG tablet, Take 1 tablet by mouth Every 6 (Six) Hours As Needed for Nausea or Vomiting., Disp: 20 tablet, Rfl: 0  •  VENTOLIN  (90 Base) MCG/ACT inhaler, Inhale 2 puffs Every 4 (Four) Hours As Needed for Wheezing., Disp: , Rfl:     Allergies:  Allergies   Allergen Reactions   • Asa [Aspirin] GI Intolerance and Unknown (See Comments)     Gi upset    • Bean Pod Extract GI Intolerance     MILK AND SOY   • Penicillins GI Intolerance   • Soybean-Containing Drug Products GI Intolerance     All soy   • Strawberry Flavor GI Intolerance   • Tetanus-Diphth-Acell Pertussis Unknown (See Comments)     \" legs swelling\" happened when was an infant          Review of Systems   Musculoskeletal: Positive for back pain and neck pain.   Neurological: Positive for weakness, light-headedness, numbness and headache.   All other systems " "reviewed and are negative.        Physical Exam  Alert, oriented, no acute distress  Lower extremity strength is intact and equal bilaterally  Sensation is equal throughout  Lumbar incisions are well-healed without erythema, fluctuance, drainage.  Nontender to palpation    Vitals:    04/08/19 1422   BP: 100/60   Temp: 97.8 °F (36.6 °C)   TempSrc: Temporal   Weight: 66.7 kg (147 lb)   Height: 162.6 cm (64\")         Imaging Review:  Lumbar x-rays: Stable alignment of lumbar fusion L5-S1    Assessment:  Recurrent lumbar disc herniation L5-S1 on the right, status post posterior lumbar interbody fusion L5-S1 on 2/8/2019    Plan:  Ms. Hugo is seen today in a postoperative visit following an uncomplicated TLIF L5-S1 on 2/8/2019.  She has had some ongoing back and lower extremity pain, which I explained to her is typical during the healing process.  Overall her strength and ambulation is improving.  She will begin physical therapy at this time.  I have refilled her Norco as well as Robaxin.  She will follow-up in 6 weeks.  If she requests additional pain medication out of the 90-day period, we will discuss a referral to pain management.  She was instructed to contact our office with any worsening symptoms or concerns.    Follow Up:  6 weeks    Irina Damon PA-C  "

## 2019-04-16 RX ORDER — HYDROCODONE BITARTRATE AND ACETAMINOPHEN 5; 325 MG/1; MG/1
1 TABLET ORAL EVERY 8 HOURS PRN
Qty: 21 TABLET | Refills: 0 | Status: SHIPPED | OUTPATIENT
Start: 2019-04-16 | End: 2019-04-30 | Stop reason: SDUPTHER

## 2019-04-16 NOTE — TELEPHONE ENCOUNTER
Refill approved. Please let patient know that we cannot refill her narcotic pain medication once outside of the 90 day window, which will be in 3 weeks. She needs to try to start weaning herself off the pain medicine.

## 2019-04-16 NOTE — TELEPHONE ENCOUNTER
Provider: Holly   Caller: self  Time of call: 1:03    Phone #:  [phone]  Surgery:  LUMBAR LAMINECTOMY POSTERIOR LUMBAR INTERBODY FUSION RIGHT L5-S1   Surgery Date: 2/8/19   Last visit:   4/8/19  Next visit: 5/23/19    SHIRA:   03/11/2019 Hydrocodone/Acetaminophen  325MG/7.5MG  1985 30 10 MOLLY ROMANO [employer]  [place] 23 1  03/26/2019 Hydrocodone/Acetaminophen  325MG/5MG  1985 30 10 MOLLY ROMANO [employer]  [place] 15 1  04/08/2019 Hydrocodone/Acetaminophen  325MG/5MG  1985 21 7 MOLLY ROMANO [employer] [employer]  [place] 15 1         Reason for call:       Requested Prescriptions     Pending Prescriptions Disp Refills   • HYDROcodone-acetaminophen (NORCO) 5-325 MG per tablet 30 tablet 0     Sig: Take 1 tablet by mouth Every 8 (Eight) Hours As Needed for Moderate Pain .

## 2019-04-30 DIAGNOSIS — M51.26 HERNIATED LUMBAR DISC WITHOUT MYELOPATHY: Primary | ICD-10-CM

## 2019-04-30 RX ORDER — HYDROCODONE BITARTRATE AND ACETAMINOPHEN 5; 325 MG/1; MG/1
1 TABLET ORAL 2 TIMES DAILY
Qty: 14 TABLET | Refills: 0 | Status: SHIPPED | OUTPATIENT
Start: 2019-04-30

## 2019-04-30 NOTE — TELEPHONE ENCOUNTER
Please ask patient if she has weaned herself from this medication. I approved a refill for 1 week. We need to arrange pain management referral if she wishes.

## 2019-04-30 NOTE — TELEPHONE ENCOUNTER
Provider:  Stu  Caller: patient  Time of call:  9:51   Phone #:  [phone]  Surgery:  Lumbar laminectomy posterior L5-S1  Surgery Date:  02/08/19  Last visit:  04/08/19   Next visit: 05/23/19    PORFIRIO:     Porfirio has been contacted-waiting on return call.    Reason for call:     Patient request refill on pain medication.

## 2019-04-30 NOTE — TELEPHONE ENCOUNTER
Patient states that she has been slowly weaning herself off of Norco.  She would like to finish PT before she decides if she wants to be referred to PM.  Patient will  RX tomorrow.  RX has been left at the .

## 2019-05-23 ENCOUNTER — OFFICE VISIT (OUTPATIENT)
Dept: NEUROSURGERY | Facility: CLINIC | Age: 34
End: 2019-05-23

## 2019-05-23 VITALS
DIASTOLIC BLOOD PRESSURE: 68 MMHG | SYSTOLIC BLOOD PRESSURE: 124 MMHG | OXYGEN SATURATION: 96 % | RESPIRATION RATE: 24 BRPM | BODY MASS INDEX: 24.24 KG/M2 | HEART RATE: 93 BPM | WEIGHT: 142 LBS | HEIGHT: 64 IN

## 2019-05-23 DIAGNOSIS — Z98.1 STATUS POST LUMBAR SPINAL FUSION: Primary | ICD-10-CM

## 2019-05-23 PROCEDURE — 99213 OFFICE O/P EST LOW 20 MIN: CPT | Performed by: NEUROLOGICAL SURGERY

## 2019-05-23 RX ORDER — RANITIDINE 150 MG/1
150 CAPSULE ORAL 2 TIMES DAILY
Refills: 11 | COMMUNITY
Start: 2019-05-15 | End: 2019-09-12

## 2019-05-23 NOTE — PROGRESS NOTES
Subjective   Elisa Hugo is a 33 y.o. female who presents for follow up of PLIF L5-S1.     The patient had an L5-S1 TLIF with pedicle screw fixation 3-1/2 months ago on 2/8/2019 for recurrent disc herniation.  She still has back pain and leg pain whenever she is up sitting or walking for very long.  She still has to spend much of her time lying down for pain relief.  She walks up to a half a mile a day regularly.  She has stopped taking narcotic medication over the past 3 weeks.  She has not been able to return to work.    Lumbar x-rays on 4/8/2019 showed excellent position of the interbody graft and the pedicle screws.    The following portions of the patient's history were reviewed, updated as appropriate and approved: allergies, current medications, past family history, past medical history, past social history, past surgical history, review of systems and problem list.     Review of Systems   Musculoskeletal: Positive for back pain and neck pain.   Neurological: Positive for weakness, light-headedness, numbness and headaches.       Objective    NEUROLOGICAL EXAMINATION:    Lumbar incisions are very well-healed.    Assessment   3-1/2 months postop right TLIF with pedicle screw fixation for recurrent disc herniation.  Continuing back pain postop.  No evidence of surgical complication, although her progress and pain relief has been slow, which I hope is simply due to the slow process of ossification of the fusion site.       Plan   Follow-up in 3 months.  Continue with daily exercise.       César Peraza MD

## 2019-08-29 ENCOUNTER — OFFICE VISIT (OUTPATIENT)
Dept: NEUROSURGERY | Facility: CLINIC | Age: 34
End: 2019-08-29

## 2019-08-29 DIAGNOSIS — Z98.1 STATUS POST LUMBAR SPINAL FUSION: Primary | ICD-10-CM

## 2019-08-29 PROCEDURE — 99213 OFFICE O/P EST LOW 20 MIN: CPT | Performed by: NEUROLOGICAL SURGERY

## 2019-08-29 NOTE — PROGRESS NOTES
Subjective   Elisa Hugo is a 33 y.o. female who presents for follow up of L5-S1 PLIF on 2/8/2019.     The patient had an L5-S1 right TLIF 6 months ago for recurrent disc herniation.  She continues to have lower back pain since her surgery.  She has been in physical therapy and while in therapy she got an increase in pain in the upper lumbar and lower thoracic region radiating around the lower rib cage which has become constant now.  She has not been able to return to work.  Postoperative lumbar x-ray showed good position of the interbody graft and pedicle screws on 4/8/2019.  She also has a past history of ACDF.    The following portions of the patient's history were reviewed, updated as appropriate and approved: allergies, current medications, past family history, past medical history, past social history, past surgical history, review of systems and problem list.     Review of Systems   Constitutional: Negative for activity change, appetite change, chills, diaphoresis, fatigue, fever and unexpected weight change.   HENT: Negative for congestion, dental problem, drooling, ear discharge, ear pain, facial swelling, hearing loss, mouth sores, nosebleeds, postnasal drip, rhinorrhea, sinus pressure, sneezing, sore throat, tinnitus, trouble swallowing and voice change.    Eyes: Negative for photophobia, pain, discharge, redness, itching and visual disturbance.   Respiratory: Negative for apnea, cough, choking, chest tightness, shortness of breath, wheezing and stridor.    Cardiovascular: Negative for chest pain, palpitations and leg swelling.   Gastrointestinal: Negative for abdominal distention, abdominal pain, anal bleeding, blood in stool, constipation, diarrhea, nausea, rectal pain and vomiting.   Endocrine: Negative for cold intolerance, heat intolerance, polydipsia, polyphagia and polyuria.   Genitourinary: Negative for decreased urine volume, difficulty urinating, dysuria, enuresis, flank pain, frequency,  genital sores, hematuria and urgency.   Musculoskeletal: Positive for back pain and neck pain. Negative for arthralgias, gait problem, joint swelling, myalgias and neck stiffness.   Skin: Negative for color change, pallor, rash and wound.   Allergic/Immunologic: Negative for environmental allergies, food allergies and immunocompromised state.   Neurological: Positive for weakness, light-headedness, numbness and headaches. Negative for dizziness, tremors, seizures, syncope, facial asymmetry and speech difficulty.   Hematological: Negative for adenopathy. Does not bruise/bleed easily.   Psychiatric/Behavioral: Negative for agitation, behavioral problems, confusion, decreased concentration, dysphoric mood, hallucinations, self-injury, sleep disturbance and suicidal ideas. The patient is not nervous/anxious and is not hyperactive.        Objective    NEUROLOGICAL EXAMINATION:    SLR produces back pain bilaterally.  No motor or sensory loss in lower extremities, equal reflexes in both knees and ankles.    Assessment   New thoracolumbar pain with radicular radiation in the lower thoracic distribution.  6 months postop PLIF with pedicle screws L5-S1.  Suspect pain related to her postoperative L5-S1 condition, but the location of pain suggests a newer and different location cause.       Plan   MR of the thoracic and lumbar spine.  Continue with therapy.  Follow-up in the office.  We need to rule out any additional cause of these new symptoms.       César Peraza MD

## 2019-09-12 ENCOUNTER — HOSPITAL ENCOUNTER (OUTPATIENT)
Dept: MRI IMAGING | Facility: HOSPITAL | Age: 34
Discharge: HOME | End: 2019-09-12

## 2019-09-12 ENCOUNTER — OFFICE VISIT (OUTPATIENT)
Dept: NEUROSURGERY | Facility: CLINIC | Age: 34
End: 2019-09-12

## 2019-09-12 ENCOUNTER — HOSPITAL ENCOUNTER (OUTPATIENT)
Dept: MRI IMAGING | Facility: HOSPITAL | Age: 34
Discharge: HOME | End: 2019-09-12
Admitting: NEUROLOGICAL SURGERY

## 2019-09-12 VITALS — WEIGHT: 143 LBS | TEMPERATURE: 97.2 F | HEIGHT: 64 IN | BODY MASS INDEX: 24.41 KG/M2

## 2019-09-12 DIAGNOSIS — Z98.1 STATUS POST LUMBAR SPINAL FUSION: ICD-10-CM

## 2019-09-12 DIAGNOSIS — M51.34 DDD (DEGENERATIVE DISC DISEASE), THORACIC: ICD-10-CM

## 2019-09-12 DIAGNOSIS — Z98.1 STATUS POST LUMBAR SPINAL FUSION: Primary | ICD-10-CM

## 2019-09-12 DIAGNOSIS — Z98.1 STATUS POST CERVICAL SPINAL FUSION: ICD-10-CM

## 2019-09-12 PROCEDURE — 0 GADOBENATE DIMEGLUMINE 529 MG/ML SOLUTION: Performed by: NEUROLOGICAL SURGERY

## 2019-09-12 PROCEDURE — 99213 OFFICE O/P EST LOW 20 MIN: CPT | Performed by: NEUROLOGICAL SURGERY

## 2019-09-12 PROCEDURE — 72158 MRI LUMBAR SPINE W/O & W/DYE: CPT

## 2019-09-12 PROCEDURE — A9577 INJ MULTIHANCE: HCPCS | Performed by: NEUROLOGICAL SURGERY

## 2019-09-12 PROCEDURE — 72157 MRI CHEST SPINE W/O & W/DYE: CPT

## 2019-09-12 RX ORDER — LANSOPRAZOLE 30 MG/1
CAPSULE, DELAYED RELEASE ORAL
Refills: 0 | COMMUNITY
Start: 2019-08-20

## 2019-09-12 RX ADMIN — GADOBENATE DIMEGLUMINE 13 ML: 529 INJECTION, SOLUTION INTRAVENOUS at 10:14

## 2019-09-26 ENCOUNTER — TELEPHONE (OUTPATIENT)
Dept: NEUROSURGERY | Facility: CLINIC | Age: 34
End: 2019-09-26

## 2019-09-26 DIAGNOSIS — M50.221 HERNIATION OF INTERVERTEBRAL DISC AT C4-C5 LEVEL: Primary | ICD-10-CM

## 2019-09-26 NOTE — TELEPHONE ENCOUNTER
Provider:  Stu  Caller: patient  Time of call:   11:48  Phone #:  [phone]  Surgery:  Lumbar laminectomy posterior   Surgery Date:  02/08/19  Last visit:   09/12/19  Next visit: None    SHIRA:         Reason for call:     Patient called upset because we referred her to a PM that doesn't prescribe pain pills.    She wanted to know if she can get a new referral to someone that provides pain medication.

## 2019-10-18 ENCOUNTER — TELEPHONE (OUTPATIENT)
Dept: NEUROSURGERY | Facility: CLINIC | Age: 34
End: 2019-10-18

## 2019-10-18 DIAGNOSIS — M50.221 HERNIATION OF INTERVERTEBRAL DISC AT C4-C5 LEVEL: Primary | ICD-10-CM

## 2019-10-18 DIAGNOSIS — Z98.1 STATUS POST LUMBAR SPINAL FUSION: ICD-10-CM

## 2019-10-18 NOTE — TELEPHONE ENCOUNTER
Provider:  Stu  Caller: patient  Time of call:   11:07  Phone #:  [phone]  Surgery:  Lumbar laminectomy   Surgery Date:  02/08/19  Last visit:   09/12/19  Next visit: none    SHIRA:         Reason for call:     Patient called and said she had some blood work and that her liver functions were high.  Her PCP told her not to take Tylenol or Advil.    She was told to call us to see what we suggest she takes for pain.

## 2019-10-18 NOTE — TELEPHONE ENCOUNTER
Spoke with pt-explained we cannot provide pain meds.  I have entered a referral for her to see OC César.

## 2019-10-18 NOTE — TELEPHONE ENCOUNTER
If she cannot take these medications she may try muscle relaxers or ice/heat on her back.  If she wishes to have stronger medication she will need a referral to pain management.

## 2019-12-05 ENCOUNTER — OFFICE VISIT (OUTPATIENT)
Dept: NEUROSURGERY | Facility: CLINIC | Age: 34
End: 2019-12-05

## 2019-12-05 VITALS
BODY MASS INDEX: 25.1 KG/M2 | SYSTOLIC BLOOD PRESSURE: 122 MMHG | HEIGHT: 64 IN | TEMPERATURE: 97.7 F | DIASTOLIC BLOOD PRESSURE: 64 MMHG | WEIGHT: 147 LBS

## 2019-12-05 DIAGNOSIS — M54.2 NECK PAIN: Primary | ICD-10-CM

## 2019-12-05 PROCEDURE — 99214 OFFICE O/P EST MOD 30 MIN: CPT | Performed by: PHYSICIAN ASSISTANT

## 2019-12-05 NOTE — PROGRESS NOTES
Patient: Elisa Hugo  : 1985  Gender: female    Primary Care Provider: Mirella Vasquez APRN      Chief Complaint:   Chief Complaint   Patient presents with   • Follow-up       History of Present Illness:  Elisa Hugo is a 33-year-old woman well-known to Dr. Peraza's service.  He performed an ACDF C6-7 in 2018 as well as microdiscectomy 2018 followed by PLIF L5-S1 2019.  She presents today for evaluation of neck and left upper extremity pain.  The pain started several weeks ago with no injury or trauma prior to onset.  She has been evaluated by her PCP who ordered a shoulder x-ray.  Presently, she reports pain in her posterior cervical region as well as left tricep and left palm.  She reports numbness of the entire left arm as well as all digits.  She reports weakness of the left arm.  She denies gait disturbance or changes in bowel or bladder function.  She states that currently her back pain is controlled.      Past Medical and Surgical History:  Past Medical History:   Diagnosis Date   • Asthma    • GERD (gastroesophageal reflux disease)    • Headache    • History of seizure as     • History of staph infection 2008    right leg after delivery of daughter; po meds only     • Hx of migraines    • Lower back pain    • Lumbar herniated disc    • MRSA infection     history of   • Stomach ulcer      Past Surgical History:   Procedure Laterality Date   • ANTERIOR CERVICAL DISCECTOMY W/ FUSION Bilateral 2018    Procedure: CERVICAL DISCECTOMY ANTERIOR WITH FUSION C6-7  BILATERAL;  Surgeon: César Peraza MD;  Location: [facility] OR;  Service: Neurosurgery   • INTERVENTIONAL RADIOLOGY PROCEDURE N/A 2018    Procedure: myelogram cervical spine ;  Surgeon: Abdi Santana MD;  Location:  [facility]IVE [facility];  Service:    • INTERVENTIONAL RADIOLOGY PROCEDURE N/A 2018    Procedure: myelogram lumbar spine;  Surgeon: Abdi Santana MD;  Location:  [facility] CATH INV[facility]  LOCATION;  Service: Interventional Radiology   • INTERVENTIONAL RADIOLOGY PROCEDURE N/A 12/18/2018    Procedure: IR myelogram lumbar spine;  Surgeon: Abdi Santana MD;  Location:  [facility];  Service: Interventional Radiology   • LUMBAR DISCECTOMY Right 10/17/2018    Procedure: LUMBAR DISCECTOMY L5-S1 RIGHT, MINIMALLY INVASIVE;  Surgeon: César Peraza MD;  Location:  [facility] OR;  Service: Neurosurgery   • LUMBAR LAMINECTOMY WITH FUSION Right 2/8/2019    Procedure: LUMBAR LAMINECTOMY POSTERIOR LUMBAR INTERBODY FUSION RIGHT L5-S1;  Surgeon: César Peraza MD;  Location:  [facility] OR;  Service: Neurosurgery   • SINUS SURGERY         Current Medications:    Current Outpatient Medications:   •  esomeprazole (nexIUM) 20 MG capsule, Take 20 mg by mouth Every Morning Before Breakfast., Disp: , Rfl:   •  cetirizine (zyrTEC) 10 MG tablet, Take 10 mg by mouth Every Night., Disp: , Rfl:   •  diphenhydrAMINE (BENADRYL) 25 mg capsule, Take 25 mg by mouth Every 6 (Six) Hours As Needed for Itching., Disp: , Rfl:   •  HYDROcodone-acetaminophen (NORCO) 5-325 MG per tablet, Take 1 tablet by mouth 2 (Two) Times a Day., Disp: 14 tablet, Rfl: 0  •  lansoprazole (PREVACID) 30 MG capsule, TAKE ONE CAPSULE BY MOUTH EVERY DAY FOR FOR GI DISTRESS/GERD, Disp: , Rfl: 0  •  methocarbamol (ROBAXIN) 500 MG tablet, Take 2 tablets by mouth 3 (Three) Times a Day., Disp: 90 tablet, Rfl: 1  •  ondansetron (ZOFRAN) 4 MG tablet, Take 1 tablet by mouth Every 6 (Six) Hours As Needed for Nausea or Vomiting., Disp: 20 tablet, Rfl: 0  •  VENTOLIN  (90 Base) MCG/ACT inhaler, Inhale 2 puffs Every 4 (Four) Hours As Needed for Wheezing., Disp: , Rfl:     Allergies:  Allergies   Allergen Reactions   • Asa [Aspirin] GI Intolerance and Unknown (See Comments)     Gi upset    • Bean Pod Extract GI Intolerance     MILK AND SOY   • Penicillins GI Intolerance   • Soybean-Containing Drug Products GI Intolerance     All soy   • Strawberry Flavor GI  "Intolerance   • Tetanus-Diphth-Acell Pertussis Unknown (See Comments)     \" legs swelling\" happened when was an infant          Review of Systems   Musculoskeletal: Positive for neck pain.   Neurological: Positive for weakness, numbness and headache.   Psychiatric/Behavioral: Positive for sleep disturbance.   All other systems reviewed and are negative.        Physical Exam   Constitutional: She is oriented to person, place, and time. She appears well-developed and well-nourished. No distress.   HENT:   Head: Normocephalic and atraumatic.   Neck: Normal range of motion. Neck supple.   Musculoskeletal: Normal range of motion. She exhibits no edema, tenderness or deformity.   Neurological: She is alert and oriented to person, place, and time.   Reflex Scores:       Tricep reflexes are 2+ on the right side and 2+ on the left side.       Bicep reflexes are 2+ on the right side and 2+ on the left side.       Brachioradialis reflexes are 2+ on the right side and 2+ on the left side.       Patellar reflexes are 2+ on the right side and 2+ on the left side.       Achilles reflexes are 2+ on the right side and 2+ on the left side.  Subjective numbness of left tricep area and left palmar region  Strength is mildly decreased in left upper extremity secondary to pain   Skin: Skin is warm and dry. She is not diaphoretic.   Psychiatric: She has a normal mood and affect.         Vitals:    12/05/19 1013   BP: 122/64   Temp: 97.7 °F (36.5 °C)   Weight: 66.7 kg (147 lb)   Height: 162.6 cm (64\")           Assessment:  Neck and left upper extremity pain  History of ACDF C6-7 April 2018    Plan:  Elisa Hugo is seen today for evaluation of neck and left upper extremity pain.  I have recommended physical therapy at this time.  I have also ordered cervical x-rays as well as upper extremity EMGs for further evaluation.  She will continue her current medications including anti-inflammatories and muscle relaxers.  Patient will follow-up in " 2-3 weeks with the studies.  Patient was instructed to contact our office with concerns or worsening symptoms.         Elsi Toure PA-C

## 2019-12-17 ENCOUNTER — HOSPITAL ENCOUNTER (OUTPATIENT)
Dept: GENERAL RADIOLOGY | Facility: HOSPITAL | Age: 34
Discharge: HOME | End: 2019-12-17
Admitting: PHYSICIAN ASSISTANT

## 2019-12-17 ENCOUNTER — TELEPHONE (OUTPATIENT)
Dept: NEUROSURGERY | Facility: CLINIC | Age: 34
End: 2019-12-17

## 2019-12-17 DIAGNOSIS — M54.2 NECK PAIN: ICD-10-CM

## 2019-12-17 PROCEDURE — 72040 X-RAY EXAM NECK SPINE 2-3 VW: CPT

## 2019-12-17 NOTE — TELEPHONE ENCOUNTER
Provider:  Elsi SHEFFIELD  Caller: patient  Time of call:   12:17  Phone #:  [phone]  Surgery:  Lumbar laminectomy posterior lumbar interbody fusion  Surgery Date:  02/08/19  Last visit:   12/05/19  Next visit: 01/23/20    SHIRA:         Reason for call:     Patient called to let us know she had her X-rays this am.    She states for 3 days she had LUE pain.  Now she has 2 digits on her Right hand that are numb.

## 2019-12-17 NOTE — TELEPHONE ENCOUNTER
I spoke with patient and she did go in for her evaluation, however her insurance has not approved PT yet.  I suggested she call them to expedite.

## 2020-01-23 ENCOUNTER — OFFICE VISIT (OUTPATIENT)
Dept: NEUROSURGERY | Facility: CLINIC | Age: 35
End: 2020-01-23

## 2020-01-23 ENCOUNTER — HOSPITAL ENCOUNTER (OUTPATIENT)
Dept: NEUROLOGY | Facility: HOSPITAL | Age: 35
Discharge: HOME | End: 2020-01-23
Admitting: PHYSICIAN ASSISTANT

## 2020-01-23 VITALS
HEIGHT: 64 IN | HEART RATE: 95 BPM | OXYGEN SATURATION: 97 % | BODY MASS INDEX: 26.02 KG/M2 | TEMPERATURE: 97.4 F | WEIGHT: 152.4 LBS | RESPIRATION RATE: 20 BRPM

## 2020-01-23 DIAGNOSIS — M54.2 NECK PAIN: ICD-10-CM

## 2020-01-23 DIAGNOSIS — M54.2 NECK PAIN: Primary | ICD-10-CM

## 2020-01-23 PROCEDURE — 95886 MUSC TEST DONE W/N TEST COMP: CPT

## 2020-01-23 PROCEDURE — 99213 OFFICE O/P EST LOW 20 MIN: CPT | Performed by: PHYSICIAN ASSISTANT

## 2020-01-23 PROCEDURE — 95910 NRV CNDJ TEST 7-8 STUDIES: CPT

## 2020-01-23 RX ORDER — GABAPENTIN 600 MG/1
600 TABLET ORAL 3 TIMES DAILY
COMMUNITY

## 2020-01-23 NOTE — PROGRESS NOTES
Patient: Elisa Hugo  : 1985  Gender: female    Primary Care Provider: Mirella Vasquez APRN    Chief Complaint: No chief complaint on file.      History of Present Illness:  Elisa Hugo is a 33-year-old woman who is well-known to our service.  She has a history of ACDF C6-2018, microdiscectomy  followed by uplift L5-S1 2019 by Dr. Peraza.  She presented around 6 weeks ago with complaints of neck and left upper extremity pain.  She states that the pain began with no trauma or injury prior to onset.  She also described numbness of the left arm as well as the digits.  She feels that the left arm is weak.  She denies gait disturbance, bowel or bladder changes, lower extremity weakness.  She has recently been established with Dr. Dodson of pain management.  She has no new complaints today.      Past Medical and Surgical History:  Past Medical History:   Diagnosis Date   • Asthma    • GERD (gastroesophageal reflux disease)    • Headache    • History of seizure as     • History of staph infection 2008    right leg after delivery of daughter; po meds only     • Hx of migraines    • Lower back pain    • Lumbar herniated disc    • MRSA infection     history of   • Stomach ulcer      Past Surgical History:   Procedure Laterality Date   • ANTERIOR CERVICAL DISCECTOMY W/ FUSION Bilateral 2018    Procedure: CERVICAL DISCECTOMY ANTERIOR WITH FUSION C6-7  BILATERAL;  Surgeon: César Peraza MD;  Location: [facility] OR;  Service: Neurosurgery   • INTERVENTIONAL RADIOLOGY PROCEDURE N/A 2018    Procedure: myelogram cervical spine ;  Surgeon: Abdi Santana MD;  Location:  [facility];  Service:    • INTERVENTIONAL RADIOLOGY PROCEDURE N/A 2018    Procedure: myelogram lumbar spine;  Surgeon: Abdi Santana MD;  Location:  [facility];  Service: Interventional Radiology   • INTERVENTIONAL RADIOLOGY PROCEDURE N/A 2018    Procedure:  IR myelogram lumbar spine;  Surgeon: Abdi Santana MD;  Location:  [facility];  Service: Interventional Radiology   • LUMBAR DISCECTOMY Right 10/17/2018    Procedure: LUMBAR DISCECTOMY L5-S1 RIGHT, MINIMALLY INVASIVE;  Surgeon: César Peraza MD;  Location:  [facility] OR;  Service: Neurosurgery   • LUMBAR LAMINECTOMY WITH FUSION Right 2/8/2019    Procedure: LUMBAR LAMINECTOMY POSTERIOR LUMBAR INTERBODY FUSION RIGHT L5-S1;  Surgeon: César Peraza MD;  Location:  [facility] OR;  Service: Neurosurgery   • SINUS SURGERY         Current Medications:    Current Outpatient Medications:   •  cetirizine (zyrTEC) 10 MG tablet, Take 10 mg by mouth Every Night., Disp: , Rfl:   •  diphenhydrAMINE (BENADRYL) 25 mg capsule, Take 25 mg by mouth Every 6 (Six) Hours As Needed for Itching., Disp: , Rfl:   •  esomeprazole (nexIUM) 20 MG capsule, Take 20 mg by mouth Every Morning Before Breakfast., Disp: , Rfl:   •  gabapentin (NEURONTIN) 300 MG capsule, Take 300 mg by mouth 3 (Three) Times a Day., Disp: , Rfl:   •  HYDROcodone-acetaminophen (NORCO) 5-325 MG per tablet, Take 1 tablet by mouth 2 (Two) Times a Day., Disp: 14 tablet, Rfl: 0  •  lansoprazole (PREVACID) 30 MG capsule, TAKE ONE CAPSULE BY MOUTH EVERY DAY FOR FOR GI DISTRESS/GERD, Disp: , Rfl: 0  •  methocarbamol (ROBAXIN) 500 MG tablet, Take 2 tablets by mouth 3 (Three) Times a Day., Disp: 90 tablet, Rfl: 1  •  ondansetron (ZOFRAN) 4 MG tablet, Take 1 tablet by mouth Every 6 (Six) Hours As Needed for Nausea or Vomiting., Disp: 20 tablet, Rfl: 0  •  VENTOLIN  (90 Base) MCG/ACT inhaler, Inhale 2 puffs Every 4 (Four) Hours As Needed for Wheezing., Disp: , Rfl:     Allergies:  Allergies   Allergen Reactions   • Asa [Aspirin] GI Intolerance and Unknown (See Comments)     Gi upset    • Bean Pod Extract GI Intolerance     MILK AND SOY   • Penicillins GI Intolerance   • Soybean-Containing Drug Products GI Intolerance     All soy   • Strawberry Flavor GI  "Intolerance   • Tetanus-Diphth-Acell Pertussis Unknown (See Comments)     \" legs swelling\" happened when was an infant          Review of Systems      Physical Exam   Constitutional: She is oriented to person, place, and time. She appears well-developed and well-nourished. No distress.   HENT:   Head: Normocephalic and atraumatic.   Neck: Normal range of motion. Neck supple.   Musculoskeletal: Normal range of motion.   Neurological: She is alert and oriented to person, place, and time. She has normal strength. Coordination and gait normal.   Skin: Skin is warm and dry. She is not diaphoretic.   Psychiatric: She has a normal mood and affect.         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 I have reviewed her latest EMG/NCV with demonstrates mild ulnar neuropathy bilaterally as well as chronic left C7 radiculopathy. At this time she reports some improvement with PT. She is not established with Dr. Brie Dodson. At this juncture additional imaging is not warranted. I have recommended cervical injections as a PM alternative, which she has politely declined. I advised her to discuss this with Dr. Dodson's office if she wishes to pursue. I discussed the signs and symptoms of cervical radiculopathy/myelopathy with Ms. Hugo in detail. She will inform us should these symptoms arise, otherwise we will plan to see her back as needed.    Follow Up:  DMITRI Toure PA-C  "

## 2020-06-01 ENCOUNTER — TELEPHONE (OUTPATIENT)
Dept: NEUROSURGERY | Facility: CLINIC | Age: 35
End: 2020-06-01

## 2020-06-01 NOTE — TELEPHONE ENCOUNTER
PATIENT CALLED REGARDING THE FOLLOWING APPOINTMENT. THEY ARE A FORMER PATIENT OF DR. BARRETT  Type: NEW PATIENT  Provider: DR. BARRETT  Caller: PATIENT  Phone Number: [phone]   Reason: PATIENT FELL UP THE STAIRS AND TRIED TO CATCH HERSELF WITH HER ARM AND FROM PUTTING HER WEIGHT ON HER ARM SHE IS NOW HAVING PAIN IN HER RIGHT LOWER BACK, LEFT ARM, AND HER NECK  Availability for callback: ANYTIME  Preferred dates for scheduling: ANYTIME     ONCE REVIEWED BY PROVIDER IF NECESSARY, PLEASE CONTACT PATIENT TO SCHEDULE APPROPRIATE APPT. LAST OFFICE VISIT WITH INITIAL PROVIDER WAS ON 9/12/19.      PATIENT SEEN HER PCP TODAY AND WENT TO THE ER LAST WEEK. PATIENT WAS TOLD TO SEE HER NEUROSURGEON. PATIENT WOULD LIKE TO SEE DR. ZAYAS IF POSSIBLE.

## 2020-06-03 ENCOUNTER — OFFICE VISIT (OUTPATIENT)
Dept: NEUROSURGERY | Facility: CLINIC | Age: 35
End: 2020-06-03

## 2020-06-03 DIAGNOSIS — Z98.1 STATUS POST LUMBAR SPINAL FUSION: ICD-10-CM

## 2020-06-03 DIAGNOSIS — M54.2 NECK PAIN: Primary | ICD-10-CM

## 2020-06-03 PROCEDURE — 99442 PR PHYS/QHP TELEPHONE EVALUATION 11-20 MIN: CPT | Performed by: PHYSICIAN ASSISTANT

## 2020-06-03 NOTE — TELEPHONE ENCOUNTER
Pt is on the schedule today for a telephone visit at 1:30.  She is aware that we will call to start that around 1:15.

## 2020-06-03 NOTE — PROGRESS NOTES
Patient: Elisa Hugo  : 1985  Gender: female    Primary Care Provider: Mirella Vasquez APRN      Chief Complaint: No chief complaint on file.      History of Present Illness:  Elisa Hugo is a 34-year-old woman who is well-known to our service.  She has a history of ACDF C6-7 , microdiscectomy 2018 followed by lumbar fusion L5-S1  by Dr. Peraza.  She was seen in our office approximately 5 months ago with complaints of neck and left upper extremity pain.  Plain films were performed and revealed stable alignment of fusion hardware at C6-7.  She contacted our office several days ago with complaints of worsening pain after a fall in which she fell down stairs onto her outstretched left arm.  She also reports some low back pain with no radiation to her legs.  She reports several falls recently.  Additionally she reports numbness in her hands and feet.  She denies overt gait disturbance or bowel or bladder dysfunction.  She has no new imaging to date.      Past Medical and Surgical History:  Past Medical History:   Diagnosis Date   • Asthma    • GERD (gastroesophageal reflux disease)    • Headache    • History of seizure as     • History of staph infection 2008    right leg after delivery of daughter; po meds only     • Hx of migraines    • Lower back pain    • Lumbar herniated disc    • MRSA infection     history of   • Stomach ulcer      Past Surgical History:   Procedure Laterality Date   • ANTERIOR CERVICAL DISCECTOMY W/ FUSION Bilateral 2018    Procedure: CERVICAL DISCECTOMY ANTERIOR WITH FUSION C6-7  BILATERAL;  Surgeon: César Peraza MD;  Location: [facility] OR;  Service: Neurosurgery   • INTERVENTIONAL RADIOLOGY PROCEDURE N/A 2018    Procedure: myelogram cervical spine ;  Surgeon: Abdi Santana MD;  Location: [facility] CATH INVASIVE LOCATION;  Service:    • INTERVENTIONAL RADIOLOGY PROCEDURE N/A 2018    Procedure: myelogram lumbar spine;  Surgeon: Abdi Santana MD;   Location:  [facility];  Service: Interventional Radiology   • INTERVENTIONAL RADIOLOGY PROCEDURE N/A 12/18/2018    Procedure: IR myelogram lumbar spine;  Surgeon: Abdi Santana MD;  Location:  [facility];  Service: Interventional Radiology   • LUMBAR DISCECTOMY Right 10/17/2018    Procedure: LUMBAR DISCECTOMY L5-S1 RIGHT, MINIMALLY INVASIVE;  Surgeon: César Peraza MD;  Location:  [facility] OR;  Service: Neurosurgery   • LUMBAR LAMINECTOMY WITH FUSION Right 2/8/2019    Procedure: LUMBAR LAMINECTOMY POSTERIOR LUMBAR INTERBODY FUSION RIGHT L5-S1;  Surgeon: César Peraza MD;  Location:  [facility] OR;  Service: Neurosurgery   • SINUS SURGERY         Current Medications:    Current Outpatient Medications:   •  cetirizine (zyrTEC) 10 MG tablet, Take 10 mg by mouth Every Night., Disp: , Rfl:   •  diphenhydrAMINE (BENADRYL) 25 mg capsule, Take 25 mg by mouth Every 6 (Six) Hours As Needed for Itching., Disp: , Rfl:   •  esomeprazole (nexIUM) 20 MG capsule, Take 20 mg by mouth Every Morning Before Breakfast., Disp: , Rfl:   •  gabapentin (NEURONTIN) 300 MG capsule, Take 300 mg by mouth 3 (Three) Times a Day., Disp: , Rfl:   •  HYDROcodone-acetaminophen (NORCO) 5-325 MG per tablet, Take 1 tablet by mouth 2 (Two) Times a Day., Disp: 14 tablet, Rfl: 0  •  lansoprazole (PREVACID) 30 MG capsule, TAKE ONE CAPSULE BY MOUTH EVERY DAY FOR FOR GI DISTRESS/GERD, Disp: , Rfl: 0  •  methocarbamol (ROBAXIN) 500 MG tablet, Take 2 tablets by mouth 3 (Three) Times a Day., Disp: 90 tablet, Rfl: 1  •  ondansetron (ZOFRAN) 4 MG tablet, Take 1 tablet by mouth Every 6 (Six) Hours As Needed for Nausea or Vomiting., Disp: 20 tablet, Rfl: 0  •  VENTOLIN  (90 Base) MCG/ACT inhaler, Inhale 2 puffs Every 4 (Four) Hours As Needed for Wheezing., Disp: , Rfl:     Allergies:  Allergies   Allergen Reactions   • Asa [Aspirin] GI Intolerance and Unknown (See Comments)     Gi upset    • Bean Pod Extract GI Intolerance      "MILK AND SOY   • Penicillins GI Intolerance   • Soybean-Containing Drug Products GI Intolerance     All soy   • Strawberry Flavor GI Intolerance   • Tetanus-Diphth-Acell Pertussis Unknown (See Comments)     \" legs swelling\" happened when was an infant          Review of Systems   Musculoskeletal: Positive for back pain and neck pain.         Physical Exam  Physical examination limited secondary to present COVID-19 environment.  Patient was alert, oriented and pleasant during our telephone encounter.  She appeared to be in no acute distress.  Speech was fluent and appropriate.      Imaging Review:  X-ray cervical spine dated 12/17/2019 demonstrates stable alignment of fusion hardware C6-7    Assessment:  1.  Neck and left arm pain  2.  Low back pain  3.  History of cervical spinal fusion  4.  History of lumbar fusion    Plan:  Elisa Hugo is seen today for evaluation of worsening neck and left upper extremity pain after a fall she endured last week.  I have ordered a cervical MRI for further evaluation of left arm pain, numbness of her hands and feet and frequent falls.  Patient will follow-up in office once the study is completed.  Signs/symptoms of cervical myelopathy were discussed in detail and she will contact us should these occur.    This visit has been rescheduled as a phone visit to comply with patient safety concerns in accordance with CDC recommendations. Total time of discussion was 20 minutes.   You have chosen to receive care through a telephone visit. Do you consent to use a telephone visit for your medical care today? Yes      Elsi Toure PA-C  " Detailed exam

## 2020-06-16 ENCOUNTER — OFFICE VISIT (OUTPATIENT)
Dept: NEUROSURGERY | Facility: CLINIC | Age: 35
End: 2020-06-16

## 2020-06-16 ENCOUNTER — HOSPITAL ENCOUNTER (OUTPATIENT)
Dept: MRI IMAGING | Facility: HOSPITAL | Age: 35
Discharge: HOME | End: 2020-06-16
Admitting: PHYSICIAN ASSISTANT

## 2020-06-16 VITALS
BODY MASS INDEX: 26.29 KG/M2 | DIASTOLIC BLOOD PRESSURE: 62 MMHG | HEIGHT: 64 IN | TEMPERATURE: 98.2 F | SYSTOLIC BLOOD PRESSURE: 116 MMHG | WEIGHT: 154 LBS

## 2020-06-16 DIAGNOSIS — M54.2 NECK PAIN: Primary | ICD-10-CM

## 2020-06-16 DIAGNOSIS — M54.2 NECK PAIN: ICD-10-CM

## 2020-06-16 PROCEDURE — 99213 OFFICE O/P EST LOW 20 MIN: CPT | Performed by: PHYSICIAN ASSISTANT

## 2020-06-16 PROCEDURE — 72141 MRI NECK SPINE W/O DYE: CPT

## 2020-06-16 NOTE — PROGRESS NOTES
Patient: Elisa Hugo  : 1985  Gender: female    Primary Care Provider: Mirella Vasquez APRN      Chief Complaint:   Chief Complaint   Patient presents with   • Follow-up       History of Present Illness:  Elisa Hugo is a 34-year-old woman who is well-known to Dr. Peraza's service.  She has a history of ACDF C6-7 2019, microdiscectomy 2019 followed by PLIF L5-S1 shortly after.  More recently she presented with neck and left upper extremity pain.  Plain films were reviewed and were stable.  She had a fall going up some stairs in which she fell on outstretched hand.  Post fall she reports some low-grade back pain.  She has been to physical therapy and is established with pain management.  She denies new upper or lower extremity numbness/paresthesias, gait disturbance, bowel or bladder dysfunction.  She presents today with a cervical MRI.      Past Medical and Surgical History:  Past Medical History:   Diagnosis Date   • Asthma    • GERD (gastroesophageal reflux disease)    • Headache    • History of seizure as     • History of staph infection 2008    right leg after delivery of daughter; po meds only     • Hx of migraines    • Lower back pain    • Lumbar herniated disc    • MRSA infection     history of   • Stomach ulcer      Past Surgical History:   Procedure Laterality Date   • ANTERIOR CERVICAL DISCECTOMY W/ FUSION Bilateral 2018    Procedure: CERVICAL DISCECTOMY ANTERIOR WITH FUSION C6-7  BILATERAL;  Surgeon: César Peraza MD;  Location: [facility] OR;  Service: Neurosurgery   • INTERVENTIONAL RADIOLOGY PROCEDURE N/A 2018    Procedure: myelogram cervical spine ;  Surgeon: Abdi Santana MD;  Location:  [facility] CATH INVASIVE [facility]ATION;  Service:    • INTERVENTIONAL RADIOLOGY PROCEDURE N/A 2018    Procedure: myelogram lumbar spine;  Surgeon: Abdi Santana MD;  Location:  [facility];  Service: Interventional Radiology   • INTERVENTIONAL RADIOLOGY PROCEDURE N/A  12/18/2018    Procedure: IR myelogram lumbar spine;  Surgeon: Abdi Santana MD;  Location:  [facility];  Service: Interventional Radiology   • LUMBAR DISCECTOMY Right 10/17/2018    Procedure: LUMBAR DISCECTOMY L5-S1 RIGHT, MINIMALLY INVASIVE;  Surgeon: César Peraza MD;  Location:  [facility] OR;  Service: Neurosurgery   • LUMBAR LAMINECTOMY WITH FUSION Right 2/8/2019    Procedure: LUMBAR LAMINECTOMY POSTERIOR LUMBAR INTERBODY FUSION RIGHT L5-S1;  Surgeon: César Peraza MD;  Location:  [facility] OR;  Service: Neurosurgery   • SINUS SURGERY         Current Medications:    Current Outpatient Medications:   •  diphenhydrAMINE (BENADRYL) 25 mg capsule, Take 25 mg by mouth Every 6 (Six) Hours As Needed for Itching., Disp: , Rfl:   •  esomeprazole (nexIUM) 20 MG capsule, Take 20 mg by mouth Every Morning Before Breakfast., Disp: , Rfl:   •  gabapentin (NEURONTIN) 600 MG tablet, Take 600 mg by mouth 3 (Three) Times a Day., Disp: , Rfl:   •  HYDROcodone-acetaminophen (NORCO) 5-325 MG per tablet, Take 1 tablet by mouth 2 (Two) Times a Day., Disp: 14 tablet, Rfl: 0  •  lansoprazole (PREVACID) 30 MG capsule, TAKE ONE CAPSULE BY MOUTH EVERY DAY FOR FOR GI DISTRESS/GERD, Disp: , Rfl: 0  •  methocarbamol (ROBAXIN) 500 MG tablet, Take 2 tablets by mouth 3 (Three) Times a Day., Disp: 90 tablet, Rfl: 1  •  ondansetron (ZOFRAN) 4 MG tablet, Take 1 tablet by mouth Every 6 (Six) Hours As Needed for Nausea or Vomiting., Disp: 20 tablet, Rfl: 0  •  VENTOLIN  (90 Base) MCG/ACT inhaler, Inhale 2 puffs Every 4 (Four) Hours As Needed for Wheezing., Disp: , Rfl:   •  cetirizine (zyrTEC) 10 MG tablet, Take 10 mg by mouth Every Night., Disp: , Rfl:     Allergies:  Allergies   Allergen Reactions   • Asa [Aspirin] GI Intolerance and Unknown (See Comments)     Gi upset    • Bean Pod Extract GI Intolerance     MILK AND SOY   • Penicillins GI Intolerance   • Soybean-Containing Drug Products GI Intolerance     All soy   •  "Strawberry Flavor GI Intolerance   • Tetanus-Diphth-Acell Pertussis Unknown (See Comments)     \" legs swelling\" happened when was an infant          Review of Systems   Musculoskeletal: Positive for back pain, neck pain and neck stiffness.   Neurological: Positive for weakness and light-headedness.   All other systems reviewed and are negative.        Physical Exam   Constitutional: She is oriented to person, place, and time. She appears well-developed and well-nourished. No distress.   HENT:   Head: Normocephalic and atraumatic.   Neck: Normal range of motion. Neck supple.   Musculoskeletal: Normal range of motion. She exhibits no edema, tenderness or deformity.   Neurological: She is alert and oriented to person, place, and time. She has normal strength. No sensory deficit. She displays a negative Romberg sign. Coordination and gait normal.   Skin: Skin is warm. She is not diaphoretic.   Psychiatric: She has a normal mood and affect.         Vitals:    06/16/20 1434   BP: 116/62   Temp: 98.2 °F (36.8 °C)   Weight: 69.9 kg (154 lb)   Height: 162.6 cm (64\")         Imaging Review:  MRI cervical spine performed 6/16/2020 demonstrates stable appearance of fusion hardware at C5-6 and C6-7 with no significant canal or neuroforaminal stenosis.  This appears stable in comparison to 2019 exam.    Assessment:  1.  Neck and left arm pain  2.  Low back pain  3.  History of cervical spinal fusion  4.  History of lumbar fusion    Plan:  Ms. Hugo is seen today in a follow-up visit.  Cervical MRI was ordered with the above-noted findings, ruling out a new cervical disc herniation.  I recommended she discuss cervical injections with her pain management physician.  We discussed general body mechanics moving forward to improve her pain.  Patient will continue to observe symptoms and follow-up as needed in our clinic.        Elsi Toure PA-C  "

## 2020-11-23 ENCOUNTER — OFFICE VISIT (OUTPATIENT)
Dept: NEUROSURGERY | Facility: CLINIC | Age: 35
End: 2020-11-23

## 2020-11-23 VITALS
HEIGHT: 64 IN | BODY MASS INDEX: 29.98 KG/M2 | SYSTOLIC BLOOD PRESSURE: 112 MMHG | WEIGHT: 175.6 LBS | DIASTOLIC BLOOD PRESSURE: 84 MMHG

## 2020-11-23 DIAGNOSIS — Z98.1 STATUS POST LUMBAR SPINAL FUSION: Primary | ICD-10-CM

## 2020-11-23 PROCEDURE — 99214 OFFICE O/P EST MOD 30 MIN: CPT | Performed by: PHYSICIAN ASSISTANT

## 2020-11-23 NOTE — PROGRESS NOTES
Patient: Elisa Hugo  : 1985  Gender: female    Primary Care Provider: Mirella Vasquez APRN    Chief Complaint: Back and left leg pain    History of Present Illness:  Elisa Hugo is a 34-year-old woman who is well-known to our clinic.  She has a history of ACDF C6-7 2018, microdiscectomy  followed by PLIF L5-S1 shortly after.  More recently she was seen in our clinic for neck and left arm symptoms at which time imaging appeared stable.  She states that over the last month she has noted increased pain in her back and left leg.  She states that she started to increase her activity so that she could return to work.  Since that time she has noted increased pain in her low back that radiates in a similar distribution as preop in her left leg.  She reports some sensory alteration of the bottom of her left foot.  She denies overt weakness, numbness, bowel or bladder dysfunction.  She is established with the pain clinic who writes Norco and gabapentin.  Ultimately her symptoms persist.  She has no new imaging.      Past Medical and Surgical History:  Past Medical History:   Diagnosis Date   • Asthma    • GERD (gastroesophageal reflux disease)    • Headache    • History of seizure as     • History of staph infection 2008    right leg after delivery of daughter; po meds only     • Hx of migraines    • Lower back pain    • Lumbar herniated disc    • MRSA infection     history of   • Stomach ulcer      Past Surgical History:   Procedure Laterality Date   • ANTERIOR CERVICAL DISCECTOMY W/ FUSION Bilateral 2018    Procedure: CERVICAL DISCECTOMY ANTERIOR WITH FUSION C6-7  BILATERAL;  Surgeon: César Peraza MD;  Location:  [facility] OR;  Service: Neurosurgery   • INTERVENTIONAL RADIOLOGY PROCEDURE N/A 2018    Procedure: myelogram cervical spine ;  Surgeon: Abdi Santana MD;  Location: [facility] CATH INVASIVE LOCATION;  Service:    • INTERVENTIONAL RADIOLOGY PROCEDURE N/A 2018    Procedure:  myelogram lumbar spine;  Surgeon: Abdi Santana MD;  Location:  [facility];  Service: Interventional Radiology   • INTERVENTIONAL RADIOLOGY PROCEDURE N/A 12/18/2018    Procedure: IR myelogram lumbar spine;  Surgeon: Abdi Santana MD;  Location:  [facility];  Service: Interventional Radiology   • LUMBAR DISCECTOMY Right 10/17/2018    Procedure: LUMBAR DISCECTOMY L5-S1 RIGHT, MINIMALLY INVASIVE;  Surgeon: César Peraza MD;  Location:  [facility] OR;  Service: Neurosurgery   • LUMBAR LAMINECTOMY WITH FUSION Right 2/8/2019    Procedure: LUMBAR LAMINECTOMY POSTERIOR LUMBAR INTERBODY FUSION RIGHT L5-S1;  Surgeon: César Peraza MD;  Location:  [facility] OR;  Service: Neurosurgery   • SINUS SURGERY         Current Medications:    Current Outpatient Medications:   •  diphenhydrAMINE (BENADRYL) 25 mg capsule, Take 25 mg by mouth Every 6 (Six) Hours As Needed for Itching., Disp: , Rfl:   •  esomeprazole (nexIUM) 20 MG capsule, Take 20 mg by mouth Every Morning Before Breakfast., Disp: , Rfl:   •  gabapentin (NEURONTIN) 600 MG tablet, Take 600 mg by mouth 3 (Three) Times a Day., Disp: , Rfl:   •  HYDROcodone-acetaminophen (NORCO) 5-325 MG per tablet, Take 1 tablet by mouth 2 (Two) Times a Day., Disp: 14 tablet, Rfl: 0  •  lansoprazole (PREVACID) 30 MG capsule, TAKE ONE CAPSULE BY MOUTH EVERY DAY FOR FOR GI DISTRESS/GERD, Disp: , Rfl: 0  •  methocarbamol (ROBAXIN) 500 MG tablet, Take 2 tablets by mouth 3 (Three) Times a Day., Disp: 90 tablet, Rfl: 1  •  ondansetron (ZOFRAN) 4 MG tablet, Take 1 tablet by mouth Every 6 (Six) Hours As Needed for Nausea or Vomiting., Disp: 20 tablet, Rfl: 0  •  VENTOLIN  (90 Base) MCG/ACT inhaler, Inhale 2 puffs Every 4 (Four) Hours As Needed for Wheezing., Disp: , Rfl:     Allergies:  Allergies   Allergen Reactions   • Asa [Aspirin] GI Intolerance and Unknown (See Comments)     Gi upset    • Bean Pod Extract GI Intolerance     MILK AND SOY   • Penicillins  "GI Intolerance   • Soybean-Containing Drug Products GI Intolerance     All soy   • Strawberry Flavor GI Intolerance   • Tetanus-Diphth-Acell Pertussis Unknown (See Comments)     \" legs swelling\" happened when was an infant          Review of Systems   Musculoskeletal: Positive for back pain, neck pain and neck stiffness.   Neurological: Positive for weakness and light-headedness.   All other systems reviewed and are negative.        Physical Exam  Constitutional:       Appearance: Normal appearance.   HENT:      Head: Normocephalic and atraumatic.   Neck:      Musculoskeletal: Normal range of motion and neck supple.   Musculoskeletal: Normal range of motion.   Skin:     General: Skin is warm and dry.   Neurological:      Mental Status: She is alert and oriented to person, place, and time.      Motor: Motor function is intact.      Coordination: Coordination is intact.      Gait: Gait is intact.   Psychiatric:         Mood and Affect: Mood normal.         Behavior: Behavior normal.           Vitals:    11/23/20 1334   BP: 112/84   BP Location: Left arm   Patient Position: Sitting   Cuff Size: Adult   Weight: 79.7 kg (175 lb 9.6 oz)   Height: 162.6 cm (64\")       Patient's Body mass index is 30.14 kg/m².    Imaging Review:  No new imaging    Assessment:  1.  Back and left leg pain  2.  Low back pain  3.  History of cervical spinal fusion  4.  History of lumbar fusion    Plan:  Ms. Hugo is seen today for evaluation of increased back and left leg pain.  Preoperatively her pain was mostly on the right side.  I have ordered lumbar plain films to assess her hardware.  I have also referred her to physical therapy.  She will continue medications per her pain management physician.  I will call with x-ray results once obtained.  Otherwise she will return in around 6 weeks following some therapy.        Elsi Toure PA-C  "

## 2021-01-18 ENCOUNTER — HOSPITAL ENCOUNTER (OUTPATIENT)
Dept: GENERAL RADIOLOGY | Facility: HOSPITAL | Age: 36
Discharge: HOME | End: 2021-01-18
Admitting: PHYSICIAN ASSISTANT

## 2021-01-18 ENCOUNTER — OFFICE VISIT (OUTPATIENT)
Dept: NEUROSURGERY | Facility: CLINIC | Age: 36
End: 2021-01-18

## 2021-01-18 VITALS
SYSTOLIC BLOOD PRESSURE: 110 MMHG | HEIGHT: 64 IN | WEIGHT: 176.2 LBS | TEMPERATURE: 97 F | BODY MASS INDEX: 30.08 KG/M2 | DIASTOLIC BLOOD PRESSURE: 70 MMHG

## 2021-01-18 DIAGNOSIS — Z98.1 STATUS POST LUMBAR SPINAL FUSION: ICD-10-CM

## 2021-01-18 DIAGNOSIS — Z98.1 STATUS POST LUMBAR SPINAL FUSION: Primary | ICD-10-CM

## 2021-01-18 PROCEDURE — 72110 X-RAY EXAM L-2 SPINE 4/>VWS: CPT

## 2021-01-18 PROCEDURE — 99213 OFFICE O/P EST LOW 20 MIN: CPT | Performed by: PHYSICIAN ASSISTANT

## 2021-01-18 NOTE — PROGRESS NOTES
Patient: Elisa Hugo  : 1985  Gender: female    Primary Care Provider: Mirella Vasquez APRN    Chief Complaint: Back and left leg pain    History of Present Illness:  Elisa Hugo is a 34-year-old woman who is well-known to our clinic.  She has a history of ACDF C6-7 2018, microdiscectomy  followed by PLIF L5-S1 shortly after.  More recently she was seen in our clinic for neck and left arm symptoms at which time imaging appeared stable.  She states that over the last month she has noted increased pain in her back and left leg.  She states that she started to increase her activity so that she could return to work.  Since that time she has noted increased pain in her low back that radiates in a similar distribution as preop in her left leg.  She reports some sensory alteration of the bottom of her left foot.  She denies overt weakness, numbness, bowel or bladder dysfunction.  I referred her to physical therapy which she has completed.  She presents today with flexion-extension plain films.      Past Medical and Surgical History:  Past Medical History:   Diagnosis Date   • Asthma    • GERD (gastroesophageal reflux disease)    • Headache    • History of seizure as     • History of staph infection 2008    right leg after delivery of daughter; po meds only     • Hx of migraines    • Lower back pain    • Lumbar herniated disc    • MRSA infection     history of   • Stomach ulcer      Past Surgical History:   Procedure Laterality Date   • ANTERIOR CERVICAL DISCECTOMY W/ FUSION Bilateral 2018    Procedure: CERVICAL DISCECTOMY ANTERIOR WITH FUSION C6-7  BILATERAL;  Surgeon: César Peraza MD;  Location: [facility] OR;  Service: Neurosurgery   • INTERVENTIONAL RADIOLOGY PROCEDURE N/A 2018    Procedure: myelogram cervical spine ;  Surgeon: Abdi Santana MD;  Location: [facility] CATH INVASIVE LOCATION;  Service:    • INTERVENTIONAL RADIOLOGY PROCEDURE N/A 2018    Procedure: myelogram lumbar  spine;  Surgeon: Abdi Santana MD;  Location:  [facility];  Service: Interventional Radiology   • INTERVENTIONAL RADIOLOGY PROCEDURE N/A 12/18/2018    Procedure: IR myelogram lumbar spine;  Surgeon: Abdi Santana MD;  Location:  [facility] INV[facility];  Service: Interventional Radiology   • LUMBAR DISCECTOMY Right 10/17/2018    Procedure: LUMBAR DISCECTOMY L5-S1 RIGHT, MINIMALLY INVASIVE;  Surgeon: César Peraza MD;  Location:  [facility] OR;  Service: Neurosurgery   • LUMBAR LAMINECTOMY WITH FUSION Right 2/8/2019    Procedure: LUMBAR LAMINECTOMY POSTERIOR LUMBAR INTERBODY FUSION RIGHT L5-S1;  Surgeon: César Peraza MD;  Location:  [facility] OR;  Service: Neurosurgery   • SINUS SURGERY         Current Medications:    Current Outpatient Medications:   •  diphenhydrAMINE (BENADRYL) 25 mg capsule, Take 25 mg by mouth Every 6 (Six) Hours As Needed for Itching., Disp: , Rfl:   •  esomeprazole (nexIUM) 20 MG capsule, Take 20 mg by mouth Every Morning Before Breakfast., Disp: , Rfl:   •  gabapentin (NEURONTIN) 600 MG tablet, Take 600 mg by mouth 3 (Three) Times a Day., Disp: , Rfl:   •  HYDROcodone-acetaminophen (NORCO) 5-325 MG per tablet, Take 1 tablet by mouth 2 (Two) Times a Day., Disp: 14 tablet, Rfl: 0  •  lansoprazole (PREVACID) 30 MG capsule, TAKE ONE CAPSULE BY MOUTH EVERY DAY FOR FOR GI DISTRESS/GERD, Disp: , Rfl: 0  •  methocarbamol (ROBAXIN) 500 MG tablet, Take 2 tablets by mouth 3 (Three) Times a Day., Disp: 90 tablet, Rfl: 1  •  ondansetron (ZOFRAN) 4 MG tablet, Take 1 tablet by mouth Every 6 (Six) Hours As Needed for Nausea or Vomiting., Disp: 20 tablet, Rfl: 0  •  VENTOLIN  (90 Base) MCG/ACT inhaler, Inhale 2 puffs Every 4 (Four) Hours As Needed for Wheezing., Disp: , Rfl:     Allergies:  Allergies   Allergen Reactions   • Asa [Aspirin] GI Intolerance and Unknown (See Comments)     Gi upset    • Bean Pod Extract GI Intolerance     MILK AND SOY   • Penicillins GI Intolerance   •  "Soybean-Containing Drug Products GI Intolerance     All soy   • Strawberry Flavor GI Intolerance   • Tetanus-Diphth-Acell Pertussis Unknown (See Comments)     \" legs swelling\" happened when was an infant          Review of Systems   Constitutional: Negative for activity change, appetite change, chills, diaphoresis, fatigue, fever, unexpected weight gain and unexpected weight loss.   HENT: Negative for congestion, dental problem, drooling, ear discharge, ear pain, facial swelling, hearing loss, mouth sores, nosebleeds, postnasal drip, rhinorrhea, sinus pressure, sneezing, sore throat, swollen glands, tinnitus, trouble swallowing and voice change.    Eyes: Negative for blurred vision, double vision, photophobia, pain, discharge, redness, itching and visual disturbance.   Respiratory: Negative for apnea, cough, choking, chest tightness, shortness of breath, wheezing and stridor.    Cardiovascular: Negative for chest pain, palpitations and leg swelling.   Gastrointestinal: Negative for abdominal distention, abdominal pain, anal bleeding, blood in stool, constipation, diarrhea, nausea, rectal pain, vomiting, GERD and indigestion.   Endocrine: Negative for cold intolerance, heat intolerance, polydipsia, polyphagia and polyuria.   Genitourinary: Negative for amenorrhea, breast discharge, breast lump, breast pain, decreased libido, decreased urine volume, difficulty urinating, dyspareunia, dysuria, flank pain, frequency, genital sores, hematuria, menstrual problem, pelvic pain, pelvic pressure, urgency, urinary incontinence, vaginal bleeding, vaginal discharge and vaginal pain.   Musculoskeletal: Positive for arthralgias, back pain, neck pain and neck stiffness. Negative for gait problem, joint swelling, myalgias and bursitis.   Skin: Negative for color change, dry skin, pallor, rash, skin lesions and bruise.   Allergic/Immunologic: Negative for environmental allergies, food allergies and immunocompromised state. " "  Neurological: Positive for weakness and light-headedness. Negative for dizziness, tremors, seizures, syncope, facial asymmetry, speech difficulty, numbness, headache, memory problem and confusion.   Hematological: Negative for adenopathy. Does not bruise/bleed easily.   Psychiatric/Behavioral: Negative for agitation, behavioral problems, decreased concentration, dysphoric mood, hallucinations, self-injury, sleep disturbance, suicidal ideas, negative for hyperactivity, depressed mood and stress. The patient is not nervous/anxious.    All other systems reviewed and are negative.        Physical Exam  Constitutional:       Appearance: Normal appearance.   HENT:      Head: Normocephalic and atraumatic.   Neck:      Musculoskeletal: Normal range of motion and neck supple.   Musculoskeletal: Normal range of motion.   Skin:     General: Skin is warm and dry.   Neurological:      General: No focal deficit present.      Mental Status: She is alert and oriented to person, place, and time.      Sensory: Sensation is intact.      Motor: Motor function is intact.   Psychiatric:         Mood and Affect: Mood normal.         Behavior: Behavior normal.           Vitals:    01/18/21 1027   BP: 110/70   BP Location: Left arm   Patient Position: Sitting   Cuff Size: Adult   Temp: 97 °F (36.1 °C)   Weight: 79.9 kg (176 lb 3.2 oz)   Height: 162.6 cm (64\")       Patient's Body mass index is 30.24 kg/m². BMI is above normal parameters. Recommendations include: educational material.    Imaging Review:  Plain films performed 1/18/2021 demonstrate L5-S1 PLIF without evidence of hardware complication.  No instability in flexion-extension views. Bony fusion has not significantly progressed when compared to imaging from 4/2019.    Assessment:  1.  Chronic low back pain  2.  History of L5-S1 fusion  3.  History of cervical spinal fusion    Plan:  Ms. Hugo is seen today in follow-up.  Her lumbar x-rays appear stable, however the progression " of the bony fusion at L5-S1 is not where it should be nearly 2 years postop.  She continues to smoke tobacco.  She states that she is now down to 1/2 pack/day.  I counseled her extensively on the effects of tobacco use and having a lumbar fusion. She is aware that she will continue to have low back pain. There is no concern for adjacent level degeneration at this juncture. I advised her to continue working towards smoking cessation. If she wishes to have a lumbar injection I am happy to refer her.        Elsi Toure PA-C

## 2021-01-19 PROBLEM — M06.9 RHEUMATOID ARTHRITIS (HCC): Status: ACTIVE | Noted: 2021-01-19

## 2021-01-19 PROBLEM — F40.240 CLAUSTROPHOBIA: Status: ACTIVE | Noted: 2021-01-19

## 2021-01-19 PROBLEM — J30.9 ALLERGIC RHINITIS: Status: ACTIVE | Noted: 2021-01-19

## 2021-01-19 PROBLEM — R26.9 ABNORMAL GAIT: Status: ACTIVE | Noted: 2021-01-19

## 2021-01-19 PROBLEM — J44.9 CHRONIC OBSTRUCTIVE PULMONARY DISEASE (HCC): Status: ACTIVE | Noted: 2021-01-19

## 2021-01-20 ENCOUNTER — LAB (OUTPATIENT)
Dept: LAB | Facility: HOSPITAL | Age: 36
End: 2021-01-20

## 2021-01-20 ENCOUNTER — OFFICE VISIT (OUTPATIENT)
Dept: GASTROENTEROLOGY | Facility: CLINIC | Age: 36
End: 2021-01-20

## 2021-01-20 VITALS
TEMPERATURE: 97.8 F | HEART RATE: 111 BPM | SYSTOLIC BLOOD PRESSURE: 110 MMHG | OXYGEN SATURATION: 96 % | HEIGHT: 64 IN | DIASTOLIC BLOOD PRESSURE: 70 MMHG | WEIGHT: 176 LBS | BODY MASS INDEX: 30.05 KG/M2

## 2021-01-20 DIAGNOSIS — Z72.0 TOBACCO ABUSE: ICD-10-CM

## 2021-01-20 DIAGNOSIS — R12 HEARTBURN: ICD-10-CM

## 2021-01-20 DIAGNOSIS — K76.0 NON-ALCOHOLIC FATTY LIVER DISEASE: ICD-10-CM

## 2021-01-20 DIAGNOSIS — K21.9 GASTROESOPHAGEAL REFLUX DISEASE, UNSPECIFIED WHETHER ESOPHAGITIS PRESENT: ICD-10-CM

## 2021-01-20 DIAGNOSIS — R79.89 ABNORMAL LIVER FUNCTION TESTS: ICD-10-CM

## 2021-01-20 DIAGNOSIS — K76.0 NON-ALCOHOLIC FATTY LIVER DISEASE: Primary | ICD-10-CM

## 2021-01-20 PROCEDURE — 86038 ANTINUCLEAR ANTIBODIES: CPT

## 2021-01-20 PROCEDURE — 82390 ASSAY OF CERULOPLASMIN: CPT | Performed by: INTERNAL MEDICINE

## 2021-01-20 PROCEDURE — 83516 IMMUNOASSAY NONANTIBODY: CPT | Performed by: INTERNAL MEDICINE

## 2021-01-20 PROCEDURE — 82103 ALPHA-1-ANTITRYPSIN TOTAL: CPT

## 2021-01-20 PROCEDURE — 82104 ALPHA-1-ANTITRYPSIN PHENO: CPT

## 2021-01-20 PROCEDURE — 99244 OFF/OP CNSLTJ NEW/EST MOD 40: CPT | Performed by: INTERNAL MEDICINE

## 2021-01-20 PROCEDURE — 36415 COLL VENOUS BLD VENIPUNCTURE: CPT

## 2021-01-20 PROCEDURE — 86376 MICROSOMAL ANTIBODY EACH: CPT

## 2021-01-20 RX ORDER — HYDROXYCHLOROQUINE SULFATE 200 MG/1
200 TABLET, FILM COATED ORAL DAILY
COMMUNITY

## 2021-01-20 RX ORDER — FOLIC ACID 1 MG/1
1 TABLET ORAL DAILY
COMMUNITY

## 2021-01-20 RX ORDER — LORATADINE 10 MG/1
10 TABLET ORAL DAILY
COMMUNITY

## 2021-01-20 RX ORDER — LANOLIN ALCOHOL/MO/W.PET/CERES
1000 CREAM (GRAM) TOPICAL DAILY
COMMUNITY

## 2021-01-20 NOTE — PROGRESS NOTES
Patient Name: Elisa Hugo  YOB: 1985   Medical Record number: [id number]     PCP: Mirella Vasquez APRN    Chief Complaint   Patient presents with   • fatty liver       History of Present Illness:   HPI  I appreciate the consult for fatty liver.  Ms. Hugo is a 35-year-old with a history of GERD and possibly rheumatoid arthritis.  She presents for evaluation of elevated liver test and fatty liver.  The patient says that about a year ago there was some elevation of a few liver enzymes.  She had some improvement but recently the studies were elevated again.  Ms. Hugo admits to about a 25 pound weight gain over the last 6 to 8 months.  There is a history of her father having cirrhosis from an unknown etiology.  Her mother reportedly has cirrhosis from fatty liver.  Ms. Hugo does not drink alcohol.  There is no history of change in the color of her eyes or skin.  The patient denies any abdominal pain in the right upper aspect.  She does complain of recent issue with heartburn not responsive to Tums.  The patient does take Prevacid on a daily basis.  She reportedly had an upper endoscopy about 20 years ago.  There is no history of change in bowel habits or blood in the stool.  The patient denies any abdominal swelling.  There is no history of night sweats, fever or chills.  Ms. Hugo has not been started on any new medication.  Past Medical History:   Diagnosis Date   • Asthma    • GERD (gastroesophageal reflux disease)    • Headache    • History of seizure as     • History of staph infection 2008    right leg after delivery of daughter; po meds only     • Hx of migraines    • Lower back pain    • Lumbar herniated disc    • MRSA infection     history of   • Stomach ulcer        Past Surgical History:   Procedure Laterality Date   • ANTERIOR CERVICAL DISCECTOMY W/ FUSION Bilateral 2018    Procedure: CERVICAL DISCECTOMY ANTERIOR WITH FUSION C6-7  BILATERAL;  Surgeon: César Peraza MD;   Location:  [facility];  Service: Neurosurgery   • INTERVENTIONAL RADIOLOGY PROCEDURE N/A 2/1/2018    Procedure: myelogram cervical spine ;  Surgeon: Abdi Santana MD;  Location:  [facility];  Service:    • INTERVENTIONAL RADIOLOGY PROCEDURE N/A 9/6/2018    Procedure: myelogram lumbar spine;  Surgeon: Abdi Santana MD;  Location:  [facility];  Service: Interventional Radiology   • INTERVENTIONAL RADIOLOGY PROCEDURE N/A 12/18/2018    Procedure: IR myelogram lumbar spine;  Surgeon: Abdi Santana MD;  Location:  [facility];  Service: Interventional Radiology   • LUMBAR DISCECTOMY Right 10/17/2018    Procedure: LUMBAR DISCECTOMY L5-S1 RIGHT, MINIMALLY INVASIVE;  Surgeon: César Peraza MD;  Location:  [facility];  Service: Neurosurgery   • LUMBAR LAMINECTOMY WITH FUSION Right 2/8/2019    Procedure: LUMBAR LAMINECTOMY POSTERIOR LUMBAR INTERBODY FUSION RIGHT L5-S1;  Surgeon: César Peraza MD;  Location:  [facility];  Service: Neurosurgery   • SINUS SURGERY           Current Outpatient Medications:   •  diphenhydrAMINE (BENADRYL) 25 mg capsule, Take 25 mg by mouth Every 6 (Six) Hours As Needed for Itching., Disp: , Rfl:   •  folic acid (FOLVITE) 1 MG tablet, Take 1 mg by mouth Daily., Disp: , Rfl:   •  gabapentin (NEURONTIN) 600 MG tablet, Take 600 mg by mouth 3 (Three) Times a Day., Disp: , Rfl:   •  HYDROcodone-acetaminophen (NORCO) 5-325 MG per tablet, Take 1 tablet by mouth 2 (Two) Times a Day., Disp: 14 tablet, Rfl: 0  •  hydroxychloroquine (PLAQUENIL) 200 MG tablet, Take 200 mg by mouth Daily., Disp: , Rfl:   •  lansoprazole (PREVACID) 30 MG capsule, TAKE ONE CAPSULE BY MOUTH EVERY DAY FOR FOR GI DISTRESS/GERD, Disp: , Rfl: 0  •  loratadine (CLARITIN) 10 MG tablet, Take 10 mg by mouth Daily., Disp: , Rfl:   •  VENTOLIN  (90 Base) MCG/ACT inhaler, Inhale 2 puffs Every 4 (Four) Hours As Needed for Wheezing., Disp: , Rfl:   •  vitamin B-12 (CYANOCOBALAMIN)  "1000 MCG tablet, Take 1,000 mcg by mouth Daily., Disp: , Rfl:   •  esomeprazole (nexIUM) 20 MG capsule, Take 20 mg by mouth Every Morning Before Breakfast., Disp: , Rfl:   •  methocarbamol (ROBAXIN) 500 MG tablet, Take 2 tablets by mouth 3 (Three) Times a Day., Disp: 90 tablet, Rfl: 1  •  ondansetron (ZOFRAN) 4 MG tablet, Take 1 tablet by mouth Every 6 (Six) Hours As Needed for Nausea or Vomiting., Disp: 20 tablet, Rfl: 0    Allergies   Allergen Reactions   • Asa [Aspirin] GI Intolerance and Unknown (See Comments)     Gi upset    • Bean Pod Extract GI Intolerance     MILK AND SOY   • Penicillins GI Intolerance   • Soybean-Containing Drug Products GI Intolerance     All soy   • Strawberry Flavor GI Intolerance   • Tetanus-Diphth-Acell Pertussis Unknown (See Comments)     \" legs swelling\" happened when was an infant        Family History   Problem Relation Age of Onset   • Arthritis Mother        Social History     Socioeconomic History   • Marital status: Single     Spouse name: Not on file   • Number of children: Not on file   • Years of education: Not on file   • Highest education level: Not on file   Tobacco Use   • Smoking status: Current Every Day Smoker     Packs/day: 0.50     Years: 10.00     Pack years: 5.00   • Smokeless tobacco: Never Used   Substance and Sexual Activity   • Alcohol use: No   • Drug use: No   • Sexual activity: Defer     Birth control/protection: Abstinence       Review of Systems   Constitutional: Negative for activity change, appetite change, fatigue, fever and unexpected weight change.   HENT: Negative for dental problem, hearing loss, mouth sores, postnasal drip, sneezing, trouble swallowing and voice change.    Eyes: Negative for pain, redness, itching and visual disturbance.   Respiratory: Negative for cough, choking, chest tightness, shortness of breath and wheezing.    Cardiovascular: Negative for chest pain, palpitations and leg swelling.   Gastrointestinal: Negative for abdominal " distention, abdominal pain, anal bleeding, blood in stool, constipation, diarrhea, nausea, rectal pain and vomiting.        Heartburn   Endocrine: Negative for cold intolerance, heat intolerance, polydipsia, polyphagia and polyuria.   Genitourinary: Negative.  Negative for dysuria, enuresis, flank pain, hematuria and urgency.   Musculoskeletal: Negative for arthralgias, back pain, gait problem, joint swelling and myalgias.   Skin: Negative for color change, pallor and rash.   Allergic/Immunologic: Positive for food allergies. Negative for environmental allergies and immunocompromised state.   Neurological: Negative for dizziness, tremors, seizures, facial asymmetry, speech difficulty, numbness and headaches.   Hematological: Negative for adenopathy.   Psychiatric/Behavioral: Negative for behavioral problems, confusion, dysphoric mood, hallucinations and self-injury.       Vitals:    01/20/21 1506   BP: 110/70   Pulse: 111   Temp: 97.8 °F (36.6 °C)   SpO2: 96%       Physical Exam  Vitals signs reviewed.   Constitutional:       General: She is not in acute distress.     Appearance: Normal appearance.   HENT:      Head: Normocephalic and atraumatic.      Nose: Nose normal.      Mouth/Throat:      Mouth: Mucous membranes are moist.      Pharynx: Oropharynx is clear.      Comments: Tongue piercing  Eyes:      General: No scleral icterus.     Extraocular Movements: Extraocular movements intact.   Neck:      Musculoskeletal: Neck supple. No neck rigidity.   Cardiovascular:      Rate and Rhythm: Normal rate and regular rhythm.      Heart sounds: No murmur. No gallop.    Pulmonary:      Effort: Pulmonary effort is normal.      Breath sounds: No wheezing or rales.   Abdominal:      General: Abdomen is flat. Bowel sounds are normal.      Palpations: Abdomen is soft.      Tenderness: There is no abdominal tenderness. There is no guarding.      Comments: Hepatomegaly   Musculoskeletal: Normal range of motion.         General: No  swelling.   Skin:     General: Skin is warm and dry.      Comments: No spider nevi, no palmar erythema   Neurological:      General: No focal deficit present.      Mental Status: She is alert and oriented to person, place, and time.   Psychiatric:         Mood and Affect: Mood normal.         Thought Content: Thought content normal.         Judgment: Judgment normal.         Diagnoses and all orders for this visit:    1. Non-alcoholic fatty liver disease (Primary)  -     Mitochondrial Antibodies, M2  -     Anti-Smooth Muscle Antibody Titer  -     Anti-microsomal Antibody; Future  -     Ceruloplasmin  -     Alpha - 1 - Antitrypsin Phenotype; Future  -     Nuclear Antigen Antibody, IFA; Future    2. Abnormal liver function tests  -     Mitochondrial Antibodies, M2  -     Anti-Smooth Muscle Antibody Titer  -     Anti-microsomal Antibody; Future  -     Ceruloplasmin  -     Alpha - 1 - Antitrypsin Phenotype; Future  -     Nuclear Antigen Antibody, IFA; Future    3. Heartburn  -     Esophagogastroduodenoscopy; Future    4. Tobacco abuse    5. Gastroesophageal reflux disease, unspecified whether esophagitis present  -     Esophagogastroduodenoscopy; Future    The patient had an ultrasound that demonstrated evidence of hepatic enlargement and fatty infiltration.  There was no evidence for ascites.  She has a history that is consistent with nonalcohol fatty liver disease.  There are no stigmata of chronic liver disease on exam.  However, there is a history of probable rheumatoid arthritis and autoimmune liver disease should be ruled out.  There is also recent issue with heartburn on proton pump medication daily.  Need to evaluate endoscopically for erosive esophagitis, Rausch's esophagus or other pathology.    Plan: Will schedule EGD for further evaluation.           Will check antinuclear antibody, anti-smooth muscle antibody, antimitochondrial antibody and antimicrosomal antibody.           Will check ceruloplasmin and  alpha-1 phenotype.            Will ask for records from primary care physician.           The patient may need vaccination for hepatitis A and B.

## 2021-01-21 LAB
ACTIN IGG SERPL-ACNC: 3 UNITS (ref 0–19)
CERULOPLASMIN SERPL-MCNC: 39 MG/DL (ref 19–39)

## 2021-01-22 LAB
LKM-1 AB SER-ACNC: 1 UNITS (ref 0–20)
MITOCHONDRIA M2 IGG SER-ACNC: <20 UNITS (ref 0–20)

## 2021-01-24 ENCOUNTER — APPOINTMENT (OUTPATIENT)
Dept: PREADMISSION TESTING | Facility: HOSPITAL | Age: 36
End: 2021-01-24

## 2021-01-24 PROCEDURE — U0004 COV-19 TEST NON-CDC HGH THRU: HCPCS

## 2021-01-24 PROCEDURE — C9803 HOPD COVID-19 SPEC COLLECT: HCPCS

## 2021-01-25 LAB
A1AT PHENOTYP SERPL IFE: NORMAL
A1AT SERPL-MCNC: 173 MG/DL (ref 100–188)
SARS-COV-2 RNA RESP QL NAA+PROBE: NOT DETECTED

## 2021-01-26 ENCOUNTER — OUTSIDE FACILITY SERVICE (OUTPATIENT)
Dept: GASTROENTEROLOGY | Facility: CLINIC | Age: 36
End: 2021-01-26

## 2021-01-26 PROCEDURE — 88305 TISSUE EXAM BY PATHOLOGIST: CPT | Performed by: INTERNAL MEDICINE

## 2021-01-26 PROCEDURE — 43239 EGD BIOPSY SINGLE/MULTIPLE: CPT | Performed by: INTERNAL MEDICINE

## 2021-01-27 ENCOUNTER — LAB REQUISITION (OUTPATIENT)
Dept: LAB | Facility: HOSPITAL | Age: 36
End: 2021-01-27

## 2021-01-27 DIAGNOSIS — R12 HEARTBURN: ICD-10-CM

## 2021-01-27 DIAGNOSIS — K21.9 GASTRO-ESOPHAGEAL REFLUX DISEASE WITHOUT ESOPHAGITIS: ICD-10-CM

## 2021-01-27 LAB
ANA SPECKLED TITR SER: NORMAL {TITER}
ANA TITR SER IF: POSITIVE {TITER}
Lab: NORMAL

## 2021-01-28 LAB
CYTO UR: NORMAL
LAB AP CASE REPORT: NORMAL
LAB AP CLINICAL INFORMATION: NORMAL
PATH REPORT.FINAL DX SPEC: NORMAL
PATH REPORT.GROSS SPEC: NORMAL

## 2021-01-29 ENCOUNTER — TELEPHONE (OUTPATIENT)
Dept: GASTROENTEROLOGY | Facility: CLINIC | Age: 36
End: 2021-01-29

## 2021-01-29 NOTE — TELEPHONE ENCOUNTER
----- Message from Ryan Salgado MD sent at 1/29/2021 12:13 PM EST -----  Let Elisa know there were changes of reflux.  She will need to be on a proton pump inhibitor daily.  Thank you,  ELIECER

## 2021-08-19 ENCOUNTER — TELEPHONE (OUTPATIENT)
Dept: NEUROSURGERY | Facility: CLINIC | Age: 36
End: 2021-08-19

## 2021-08-23 NOTE — TELEPHONE ENCOUNTER
1ST ATTEMPT TO SCHEDULE PT.  F/U EXT_DX UNSPECIFIED THORACIC_NO NEW IMAGING WITH PA-C TOMÁS, NURY ARREDONDO OR LUCIANO ON A DAY THAT DR. MICHELLE WAS IN THE OFFICE.  NO ANSWER. LVM FOR CB

## 2021-08-23 NOTE — TELEPHONE ENCOUNTER
Referral mentions patient was previously seen here and the physician she saw (Dr. Bynum) was retiring. That is not correct. Patient has had no new scans since last visit. She will need to be scheduled with a PA-C (Clement Elkins Croucher, or Elsi Toure) for workup on a day Dr. Bynum is in the office.

## 2021-10-05 ENCOUNTER — OFFICE VISIT (OUTPATIENT)
Dept: NEUROSURGERY | Facility: CLINIC | Age: 36
End: 2021-10-05

## 2021-10-05 VITALS
SYSTOLIC BLOOD PRESSURE: 92 MMHG | WEIGHT: 163 LBS | TEMPERATURE: 97.3 F | DIASTOLIC BLOOD PRESSURE: 60 MMHG | BODY MASS INDEX: 27.83 KG/M2 | HEIGHT: 64 IN

## 2021-10-05 DIAGNOSIS — G89.29 CHRONIC LEFT-SIDED LOW BACK PAIN WITH LEFT-SIDED SCIATICA: ICD-10-CM

## 2021-10-05 DIAGNOSIS — M54.42 CHRONIC RIGHT-SIDED LOW BACK PAIN WITH LEFT-SIDED SCIATICA: ICD-10-CM

## 2021-10-05 DIAGNOSIS — G89.29 CHRONIC RIGHT-SIDED LOW BACK PAIN WITH LEFT-SIDED SCIATICA: ICD-10-CM

## 2021-10-05 DIAGNOSIS — M54.42 CHRONIC LEFT-SIDED LOW BACK PAIN WITH LEFT-SIDED SCIATICA: ICD-10-CM

## 2021-10-05 DIAGNOSIS — Z98.1 STATUS POST LUMBAR SPINAL FUSION: Primary | ICD-10-CM

## 2021-10-05 PROCEDURE — 99214 OFFICE O/P EST MOD 30 MIN: CPT | Performed by: PHYSICIAN ASSISTANT

## 2021-10-05 RX ORDER — METHOCARBAMOL 750 MG/1
750 TABLET, FILM COATED ORAL 3 TIMES DAILY
Qty: 60 TABLET | Refills: 1 | Status: SHIPPED | OUTPATIENT
Start: 2021-10-05 | End: 2021-11-12

## 2021-10-05 NOTE — PROGRESS NOTES
NAME: ROSALIE GRIFFITH   DOS: 10/5/2021  : 1985  PCP: Mirella Vasquez APRN    Chief Complaint:  Back Pain      History of Present Illness: Ms. Griffith is a 35 y.o. female who is well-known to the neurosurgical clinic with a noted history of ACDF C6-7 in , microdiscectomy 2019 followed shortly by a PLIF L5-S1 for right leg pain.  In 2021, patient presented with noted low back and left leg pain that has remained.  She notes the pain radiates from her low back to the posterior aspect of her left leg and into the bottom of her foot.  She also notes pain on her anterior thigh and top of the foot, however, not as significant as the posterior aspect.  She notes a stabbing,burning feeling at the plantar portion of foot.  If she reclines does help alleviate her pain minimally.  Walking or sitting exacerbates symptoms.  She has completed physical therapy with no avail of symptoms.  Has remained on gabapentin 600 mg 3 times daily and Norco.  Denies any symptoms of neurogenic claudication.      Past Medical History:   Diagnosis Date   • Asthma    • GERD (gastroesophageal reflux disease)    • Headache    • History of seizure as     • History of staph infection 2008    right leg after delivery of daughter; po meds only     • Hx of migraines    • Lower back pain    • Lumbar herniated disc    • MRSA infection     history of   • Stomach ulcer        Past Surgical History:   Procedure Laterality Date   • ANTERIOR CERVICAL DISCECTOMY W/ FUSION Bilateral 2018    Procedure: CERVICAL DISCECTOMY ANTERIOR WITH FUSION C6-7  BILATERAL;  Surgeon: César Peraza MD;  Location: [facility];  Service: Neurosurgery   • INTERVENTIONAL RADIOLOGY PROCEDURE N/A 2018    Procedure: myelogram cervical spine ;  Surgeon: Abdi Santana MD;  Location: [facility]ATH INVASIVE [facility];  Service:    • INTERVENTIONAL RADIOLOGY PROCEDURE N/A 2018    Procedure: myelogram lumbar spine;  Surgeon: Abdi Santana MD;   Location:  [facility];  Service: Interventional Radiology   • INTERVENTIONAL RADIOLOGY PROCEDURE N/A 12/18/2018    Procedure: IR myelogram lumbar spine;  Surgeon: Abdi Santana MD;  Location:  [facility];  Service: Interventional Radiology   • LUMBAR DISCECTOMY Right 10/17/2018    Procedure: LUMBAR DISCECTOMY L5-S1 RIGHT, MINIMALLY INVASIVE;  Surgeon: César Peraza MD;  Location:  [facility];  Service: Neurosurgery   • LUMBAR LAMINECTOMY WITH FUSION Right 2/8/2019    Procedure: LUMBAR LAMINECTOMY POSTERIOR LUMBAR INTERBODY FUSION RIGHT L5-S1;  Surgeon: César Peraza MD;  Location:  [facility];  Service: Neurosurgery   • SINUS SURGERY               Review of Systems   Cardiovascular: Positive for leg swelling.   Musculoskeletal: Positive for back pain and gait problem.   All other systems reviewed and are negative.           Medications:    Current Outpatient Medications:   •  diphenhydrAMINE (BENADRYL) 25 mg capsule, Take 25 mg by mouth Every 6 (Six) Hours As Needed for Itching., Disp: , Rfl:   •  esomeprazole (nexIUM) 20 MG capsule, Take 20 mg by mouth Every Morning Before Breakfast., Disp: , Rfl:   •  folic acid (FOLVITE) 1 MG tablet, Take 1 mg by mouth Daily., Disp: , Rfl:   •  gabapentin (NEURONTIN) 600 MG tablet, Take 600 mg by mouth 3 (Three) Times a Day., Disp: , Rfl:   •  HYDROcodone-acetaminophen (NORCO) 5-325 MG per tablet, Take 1 tablet by mouth 2 (Two) Times a Day., Disp: 14 tablet, Rfl: 0  •  hydroxychloroquine (PLAQUENIL) 200 MG tablet, Take 200 mg by mouth Daily., Disp: , Rfl:   •  lansoprazole (PREVACID) 30 MG capsule, TAKE ONE CAPSULE BY MOUTH EVERY DAY FOR FOR GI DISTRESS/GERD, Disp: , Rfl: 0  •  loratadine (CLARITIN) 10 MG tablet, Take 10 mg by mouth Daily., Disp: , Rfl:   •  methocarbamol (ROBAXIN) 750 MG tablet, Take 1 tablet by mouth 3 (Three) Times a Day., Disp: 60 tablet, Rfl: 1  •  ondansetron (ZOFRAN) 4 MG tablet, Take 1 tablet by mouth Every 6 (Six)  "Hours As Needed for Nausea or Vomiting., Disp: 20 tablet, Rfl: 0  •  VENTOLIN  (90 Base) MCG/ACT inhaler, Inhale 2 puffs Every 4 (Four) Hours As Needed for Wheezing., Disp: , Rfl:   •  vitamin B-12 (CYANOCOBALAMIN) 1000 MCG tablet, Take 1,000 mcg by mouth Daily., Disp: , Rfl:     Allergies:  Allergies   Allergen Reactions   • Asa [Aspirin] GI Intolerance and Unknown (See Comments)     Gi upset    • Bean Pod Extract GI Intolerance     MILK AND SOY   • Penicillins GI Intolerance   • Soybean-Containing Drug Products GI Intolerance     All soy   • Strawberry Flavor GI Intolerance   • Tetanus-Diphth-Acell Pertussis Unknown (See Comments)     \" legs swelling\" happened when was an infant        Social History     Tobacco Use   • Smoking status: Current Every Day Smoker     Packs/day: 0.50     Years: 10.00     Pack years: 5.00   • Smokeless tobacco: Never Used   Substance Use Topics   • Alcohol use: No   • Drug use: No       Family History   Problem Relation Age of Onset   • Arthritis Mother        Review of Imaging:  No new imaging to review    Vitals:    10/05/21 1055   BP: 92/60   Temp: 97.3 °F (36.3 °C)     Body mass index is 27.98 kg/m².    Physical Exam  Musculoskeletal:      Lumbar back: Positive left straight leg raise test.      Comments: Hip rotation results in pain in low back   Neurological:      Mental Status: She is oriented to person, place, and time.      Deep Tendon Reflexes:      Reflex Scores:       Patellar reflexes are 1+ on the right side and 1+ on the left side.       Achilles reflexes are 1+ on the right side and 1+ on the left side.      Neurologic Exam     Mental Status   Oriented to person, place, and time.     Motor Exam     Strength   Right iliopsoas: 5/5  Left iliopsoas: 5/5  Right quadriceps: 5/5  Left quadriceps: 5/5  Right hamstrin/5  Left hamstrin/5  Right anterior tibial: 5/5  Left anterior tibial: 5/5  Left posterior tibial: 4/5Does note low back pain with strength testing "     Gait, Coordination, and Reflexes     Gait  Gait: (Antalgic)    Reflexes   Right patellar: 1+  Left patellar: 1+  Right achilles: 1+  Left achilles: 1+  Right Srinivasan: absent  Left Srinivasan: absent  Right ankle clonus: absent  Left ankle clonus: absent      Diagnoses/Plan:    Ms. Hugo is a 35 y.o. female who is seen today with chief complaint of low back pain with left leg radiculopathy.  Patient has failed conservative management, therefore, we will pursue a MRI of the lumbar spine with and without contrast for further evaluation at this time.  We will have patient follow-up after this study is completed.  I have also provided the patient a refill of her Robaxin to see if this helps with her symptomatology.  Signs and symptoms were reviewed with patient that would warrant a sooner call to our clinic and/are 911.  Patient notes understanding and willing to proceed.      Patient's Body mass index is 27.98 kg/m². indicating that she is overweight (BMI 25-29.9). Obesity-related health conditions include the following: osteoarthritis. Obesity is improving with lifestyle modifications. BMI is is above average; BMI management plan is completed. We discussed Provided handout..     Elisa Hugo  reports that she has been smoking. She has a 5.00 pack-year smoking history. She has never used smokeless tobacco.. I have educated her on the risk of diseases from using tobacco products such as cancer, COPD and heart disease.       Maritza Banda PA-C

## 2021-10-05 NOTE — PATIENT INSTRUCTIONS
Smoking Tobacco Information, Adult  Smoking tobacco can be harmful to your health. Tobacco contains a poisonous (toxic), colorless chemical called nicotine. Nicotine is addictive. It changes the brain and can make it hard to stop smoking. Tobacco also has other toxic chemicals that can hurt your body and raise your risk of many cancers.  How can smoking tobacco affect me?  Smoking tobacco puts you at risk for:  · Cancer. Smoking is most commonly associated with lung cancer, but can also lead to cancer in other parts of the body.  · Chronic obstructive pulmonary disease (COPD). This is a long-term lung condition that makes it hard to breathe. It also gets worse over time.  · High blood pressure (hypertension), heart disease, stroke, or heart attack.  · Lung infections, such as pneumonia.  · Cataracts. This is when the lenses in the eyes become clouded.  · Digestive problems. This may include peptic ulcers, heartburn, and gastroesophageal reflux disease (GERD).  · Oral health problems, such as gum disease and tooth loss.  · Loss of taste and smell.  Smoking can affect your appearance by causing:  · Wrinkles.  · Yellow or stained teeth, fingers, and fingernails.  Smoking tobacco can also affect your social life, because:  · It may be challenging to find places to smoke when away from home. Many workplaces, restaurants, hotels, and public places are tobacco-free.  · Smoking is expensive. This is due to the cost of tobacco and the long-term costs of treating health problems from smoking.  · Secondhand smoke may affect those around you. Secondhand smoke can cause lung cancer, breathing problems, and heart disease. Children of smokers have a higher risk for:  ? Sudden infant death syndrome (SIDS).  ? Ear infections.  ? Lung infections.  If you currently smoke tobacco, quitting now can help you:  · Lead a longer and healthier life.  · Look, smell, breathe, and feel better over time.  · Save money.  · Protect others from the  harms of secondhand smoke.  What actions can I take to prevent health problems?  Quit smoking    · Do not start smoking. Quit if you already do.  · Make a plan to quit smoking and commit to it. Look for programs to help you and ask your health care provider for recommendations and ideas.  · Set a date and write down all the reasons you want to quit.  · Let your friends and family know you are quitting so they can help and support you. Consider finding friends who also want to quit. It can be easier to quit with someone else, so that you can support each other.  · Talk with your health care provider about using nicotine replacement medicines to help you quit, such as gum, lozenges, patches, sprays, or pills.  · Do not replace cigarette smoking with electronic cigarettes, which are commonly called e-cigarettes. The safety of e-cigarettes is not known, and some may contain harmful chemicals.  · If you try to quit but return to smoking, stay positive. It is common to slip up when you first quit, so take it one day at a time.  · Be prepared for cravings. When you feel the urge to smoke, chew gum or suck on hard candy.    Lifestyle  · Stay busy and take care of your body.  · Drink enough fluid to keep your urine pale yellow.  · Get plenty of exercise and eat a healthy diet. This can help prevent weight gain after quitting.  · Monitor your eating habits. Quitting smoking can cause you to have a larger appetite than when you smoke.  · Find ways to relax. Go out with friends or family to a movie or a restaurant where people do not smoke.  · Ask your health care provider about having regular tests (screenings) to check for cancer. This may include blood tests, imaging tests, and other tests.  · Find ways to manage your stress, such as meditation, yoga, or exercise.  Where to find support  To get support to quit smoking, consider:  · Asking your health care provider for more information and resources.  · Taking classes to  learn more about quitting smoking.  · Looking for local organizations that offer resources about quitting smoking.  · Joining a support group for people who want to quit smoking in your local community.  · Calling the smokefree.gov counselor helpline: 1-800-Quit-Now (1-825.519.4691)  Where to find more information  You may find more information about quitting smoking from:  · HelpGuide.org: www.helpguide.org  · Smokefree.gov: smokefree.gov  · American Lung Association: www.lung.org  Contact a health care provider if you:  · Have problems breathing.  · Notice that your lips, nose, or fingers turn blue.  · Have chest pain.  · Are coughing up blood.  · Feel faint or you pass out.  · Have other health changes that cause you to worry.  Summary  · Smoking tobacco can negatively affect your health, the health of those around you, your finances, and your social life.  · Do not start smoking. Quit if you already do. If you need help quitting, ask your health care provider.  · Think about joining a support group for people who want to quit smoking in your local community. There are many effective programs that will help you to quit this behavior.  This information is not intended to replace advice given to you by your health care provider. Make sure you discuss any questions you have with your health care provider.  Document Revised: 09/11/2020 Document Reviewed: 01/02/2018  Elsevier Patient Education © 2021 [employer]  Healthy Eating  Following a healthy eating pattern may help you to achieve and maintain a healthy body weight, reduce the risk of chronic disease, and live a long and productive life. It is important to follow a healthy eating pattern at an appropriate calorie level for your body. Your nutritional needs should be met primarily through food by choosing a variety of nutrient-rich foods.  What are tips for following this plan?  Reading food labels  · Read labels and choose the following:  ? Reduced or low  "sodium.  ? Juices with 100% fruit juice.  ? Foods with low saturated fats and high polyunsaturated and monounsaturated fats.  ? Foods with whole grains, such as whole wheat, cracked wheat, brown rice, and wild rice.  ? Whole grains that are fortified with folic acid. This is recommended for women who are pregnant or who want to become pregnant.  · Read labels and avoid the following:  ? Foods with a lot of added sugars. These include foods that contain brown sugar, corn sweetener, corn syrup, dextrose, fructose, glucose, high-fructose corn syrup, honey, invert sugar, lactose, malt syrup, maltose, molasses, raw sugar, sucrose, trehalose, or turbinado sugar.  § Do not eat more than the following amounts of added sugar per day:  § 6 teaspoons (25 g) for women.  § 9 teaspoons (38 g) for men.  ? Foods that contain processed or refined starches and grains.  ? Refined grain products, such as white flour, degermed cornmeal, white bread, and white rice.  Shopping  · Choose nutrient-rich snacks, such as vegetables, whole fruits, and nuts. Avoid high-calorie and high-sugar snacks, such as potato chips, fruit snacks, and candy.  · Use oil-based dressings and spreads on foods instead of solid fats such as butter, stick margarine, or cream cheese.  · Limit pre-made sauces, mixes, and \"instant\" products such as flavored rice, instant noodles, and ready-made pasta.  · Try more plant-protein sources, such as tofu, tempeh, black beans, edamame, lentils, nuts, and seeds.  · Explore eating plans such as the Mediterranean diet or vegetarian diet.  Cooking  · Use oil to sauté or stir-hurst foods instead of solid fats such as butter, stick margarine, or lard.  · Try baking, boiling, grilling, or broiling instead of frying.  · Remove the fatty part of meats before cooking.  · Steam vegetables in water or broth.  Meal planning    · At meals, imagine dividing your plate into fourths:  ? One-half of your plate is fruits and " vegetables.  ? One-fourth of your plate is whole grains.  ? One-fourth of your plate is protein, especially lean meats, poultry, eggs, tofu, beans, or nuts.  · Include low-fat dairy as part of your daily diet.    Lifestyle  · Choose healthy options in all settings, including home, work, school, restaurants, or stores.  · Prepare your food safely:  ? Wash your hands after handling raw meats.  ? Keep food preparation surfaces clean by regularly washing with hot, soapy water.  ? Keep raw meats separate from ready-to-eat foods, such as fruits and vegetables.  ? , meat, poultry, and eggs to the recommended internal temperature.  ? Store foods at safe temperatures. In general:  § Keep cold foods at 40°F (4.4°C) or below.  § Keep hot foods at 140°F (60°C) or above.  § Keep your freezer at 0°F (-17.8°C) or below.  § Foods are no longer safe to eat when they have been between the temperatures of 40°-140°F (4.4-60°C) for more than 2 hours.  What foods should I eat?  Fruits  Aim to eat 2 cup-equivalents of fresh, canned (in natural juice), or frozen fruits each day. Examples of 1 cup-equivalent of fruit include 1 small apple, 8 large strawberries, 1 cup canned fruit, ½ cup dried fruit, or 1 cup 100% juice.  Vegetables  Aim to eat 2½-3 cup-equivalents of fresh and frozen vegetables each day, including different varieties and colors. Examples of 1 cup-equivalent of vegetables include 2 medium carrots, 2 cups raw, leafy greens, 1 cup chopped vegetable (raw or cooked), or 1 medium baked potato.  Grains  Aim to eat 6 ounce-equivalents of whole grains each day. Examples of 1 ounce-equivalent of grains include 1 slice of bread, 1 cup ready-to-eat cereal, 3 cups popcorn, or ½ cup cooked rice, pasta, or cereal.  Meats and other proteins  Aim to eat 5-6 ounce-equivalents of protein each day. Examples of 1 ounce-equivalent of protein include 1 egg, 1/2 cup nuts or seeds, or 1 tablespoon (16 g) peanut butter. A cut of meat or  fish that is the size of a deck of cards is about 3-4 ounce-equivalents.  · Of the protein you eat each week, try to have at least 8 ounces come from seafood. This includes salmon, trout, herring, and anchovies.  Dairy  Aim to eat 3 cup-equivalents of fat-free or low-fat dairy each day. Examples of 1 cup-equivalent of dairy include 1 cup (240 mL) milk, 8 ounces (250 g) yogurt, 1½ ounces (44 g) natural cheese, or 1 cup (240 mL) fortified soy milk.  Fats and oils  · Aim for about 5 teaspoons (21 g) per day. Choose monounsaturated fats, such as canola and olive oils, avocados, peanut butter, and most nuts, or polyunsaturated fats, such as sunflower, corn, and soybean oils, walnuts, pine nuts, sesame seeds, sunflower seeds, and flaxseed.  Beverages  · Aim for six 8-oz glasses of water per day. Limit coffee to three to five 8-oz cups per day.  · Limit caffeinated beverages that have added calories, such as soda and energy drinks.  · Limit alcohol intake to no more than 1 drink a day for nonpregnant women and 2 drinks a day for men. One drink equals 12 oz of beer (355 mL), 5 oz of wine (148 mL), or 1½ oz of hard liquor (44 mL).  Seasoning and other foods  · Avoid adding excess amounts of salt to your foods. Try flavoring foods with herbs and spices instead of salt.  · Avoid adding sugar to foods.  · Try using oil-based dressings, sauces, and spreads instead of solid fats.  This information is based on general [place] nutrition guidelines. For more information, visit choosemyplate.gov. Exact amounts may vary based on your nutrition needs.  Summary  · A healthy eating plan may help you to maintain a healthy weight, reduce the risk of chronic diseases, and stay active throughout your life.  · Plan your meals. Make sure you eat the right portions of a variety of nutrient-rich foods.  · Try baking, boiling, grilling, or broiling instead of frying.  · Choose healthy options in all settings, including home, work, school,  restaurants, or stores.  This information is not intended to replace advice given to you by your health care provider. Make sure you discuss any questions you have with your health care provider.  Document Revised: 04/01/2019 Document Reviewed: 04/01/2019  Elsevier Patient Education © 2021 [employer] Inc.

## 2021-10-27 ENCOUNTER — HOSPITAL ENCOUNTER (OUTPATIENT)
Dept: MRI IMAGING | Facility: HOSPITAL | Age: 36
Discharge: HOME | End: 2021-10-27
Admitting: PHYSICIAN ASSISTANT

## 2021-10-27 ENCOUNTER — OFFICE VISIT (OUTPATIENT)
Dept: NEUROSURGERY | Facility: CLINIC | Age: 36
End: 2021-10-27

## 2021-10-27 VITALS — WEIGHT: 162.8 LBS | BODY MASS INDEX: 28.84 KG/M2 | HEIGHT: 63 IN | TEMPERATURE: 97.1 F

## 2021-10-27 DIAGNOSIS — M54.42 CHRONIC LEFT-SIDED LOW BACK PAIN WITH LEFT-SIDED SCIATICA: ICD-10-CM

## 2021-10-27 DIAGNOSIS — M96.1 POST LAMINECTOMY SYNDROME: ICD-10-CM

## 2021-10-27 DIAGNOSIS — G89.4 CHRONIC PAIN SYNDROME: ICD-10-CM

## 2021-10-27 DIAGNOSIS — Z98.1 HISTORY OF FUSION OF CERVICAL SPINE: ICD-10-CM

## 2021-10-27 DIAGNOSIS — M51.36 DEGENERATIVE DISC DISEASE, LUMBAR: ICD-10-CM

## 2021-10-27 DIAGNOSIS — Z98.1 STATUS POST LUMBAR SPINAL FUSION: ICD-10-CM

## 2021-10-27 DIAGNOSIS — Z98.1 HISTORY OF LUMBAR FUSION: Primary | ICD-10-CM

## 2021-10-27 DIAGNOSIS — G89.29 CHRONIC LEFT-SIDED LOW BACK PAIN WITH LEFT-SIDED SCIATICA: ICD-10-CM

## 2021-10-27 PROCEDURE — 72158 MRI LUMBAR SPINE W/O & W/DYE: CPT

## 2021-10-27 PROCEDURE — 99214 OFFICE O/P EST MOD 30 MIN: CPT | Performed by: PHYSICIAN ASSISTANT

## 2021-10-27 PROCEDURE — A9577 INJ MULTIHANCE: HCPCS | Performed by: PHYSICIAN ASSISTANT

## 2021-10-27 PROCEDURE — 0 GADOBENATE DIMEGLUMINE 529 MG/ML SOLUTION: Performed by: PHYSICIAN ASSISTANT

## 2021-10-27 RX ADMIN — GADOBENATE DIMEGLUMINE 15 ML: 529 INJECTION, SOLUTION INTRAVENOUS at 11:08

## 2021-10-27 NOTE — PROGRESS NOTES
"Patient: Elisa Hugo  : 1985  GENDER: female    Primary Care Provider: Mirella Vasquez APRN    Requesting Provider: As above      History    Chief Complaint: back and left leg pain with sensory alteration     History of Present Illness: Ms. Hugo is a 35-year-old, RHD, unemployed  worker (thinking about pursuing disability).  She is known to our practice for undergoing an uncomplicated ACDF at C6-7 by Dr. Peraza in 2018.  On 10/17/2018, patient underwent a right L5-S1 microdiscectomy by Dr. Peraza.  Unfortunately, she remained symptomatic, resulting in an L5-S1 fusion with Dr. Peraza on 2019.  Postoperatively, her severe right leg pain improved, however she continued to harbor ongoing low back discomfort.  This past January, patient presented with increased back and left leg pain with sensory alteration extending posteriorly down her thigh/calf-terminating in a stocking distribution.  Symptoms in her back and left leg are constant and increase substantially with standing >10-15 minutes, or with sitting >10 minutes.  She is the most asymptomatic in a slightly reclined position.  She has exhausted physical therapy to no avail, as well as remained on gabapentin 600 mg 3 times daily, and Norco 7.5's per pain management.  She denies overt right lower extremity involvement, bowel bladder dysfunction or true weakness.  She does feel that her left leg will \"give out on her when she does bear her full body weight\".  She has no other complaints at this time.    Review of Systems   Constitutional: Negative for activity change, appetite change, chills, diaphoresis, fatigue, fever and unexpected weight change.   HENT: Negative for congestion, dental problem, drooling, ear discharge, ear pain, facial swelling, hearing loss, mouth sores, nosebleeds, postnasal drip, rhinorrhea, sinus pressure, sneezing, sore throat, tinnitus, trouble swallowing and voice change.    Eyes: Negative for photophobia, pain, " discharge, redness, itching and visual disturbance.   Respiratory: Negative for apnea, cough, choking, chest tightness, shortness of breath, wheezing and stridor.    Cardiovascular: Positive for leg swelling. Negative for chest pain and palpitations.   Gastrointestinal: Negative for abdominal distention, abdominal pain, anal bleeding, blood in stool, constipation, diarrhea, nausea, rectal pain and vomiting.   Endocrine: Negative for cold intolerance, heat intolerance, polydipsia, polyphagia and polyuria.   Genitourinary: Negative for decreased urine volume, difficulty urinating, dysuria, enuresis, flank pain, frequency, genital sores, hematuria and urgency.   Musculoskeletal: Positive for back pain and gait problem. Negative for arthralgias, joint swelling, myalgias, neck pain and neck stiffness.   Skin: Negative for color change, pallor, rash and wound.   Allergic/Immunologic: Negative for environmental allergies, food allergies and immunocompromised state.   Neurological: Negative for dizziness, tremors, seizures, syncope, facial asymmetry, speech difficulty, weakness, light-headedness, numbness and headaches.   Hematological: Negative for adenopathy. Does not bruise/bleed easily.   Psychiatric/Behavioral: Negative for agitation, behavioral problems, confusion, decreased concentration, dysphoric mood, hallucinations, self-injury, sleep disturbance and suicidal ideas. The patient is not nervous/anxious and is not hyperactive.    All other systems reviewed and are negative.      Past Medical History:   Diagnosis Date   • Asthma    • GERD (gastroesophageal reflux disease)    • Headache    • History of seizure as     • History of staph infection 2008    right leg after delivery of daughter; po meds only     • Hx of migraines    • Lower back pain    • Lumbar herniated disc    • MRSA infection     history of   • Stomach ulcer      Past Surgical History:   Procedure Laterality Date   • ANTERIOR CERVICAL DISCECTOMY  W/ FUSION Bilateral 4/18/2018    Procedure: CERVICAL DISCECTOMY ANTERIOR WITH FUSION C6-7  BILATERAL;  Surgeon: César Peraza MD;  Location:  [facility];  Service: Neurosurgery   • INTERVENTIONAL RADIOLOGY PROCEDURE N/A 2/1/2018    Procedure: myelogram cervical spine ;  Surgeon: Abdi Santana MD;  Location: [facility];  Service:    • INTERVENTIONAL RADIOLOGY PROCEDURE N/A 9/6/2018    Procedure: myelogram lumbar spine;  Surgeon: Abdi Santana MD;  Location:  [facility];  Service: Interventional Radiology   • INTERVENTIONAL RADIOLOGY PROCEDURE N/A 12/18/2018    Procedure: IR myelogram lumbar spine;  Surgeon: Abdi Santana MD;  Location:  [facility];  Service: Interventional Radiology   • LUMBAR DISCECTOMY Right 10/17/2018    Procedure: LUMBAR DISCECTOMY L5-S1 RIGHT, MINIMALLY INVASIVE;  Surgeon: César Peraza MD;  Location:  [facility];  Service: Neurosurgery   • LUMBAR LAMINECTOMY WITH FUSION Right 2/8/2019    Procedure: LUMBAR LAMINECTOMY POSTERIOR LUMBAR INTERBODY FUSION RIGHT L5-S1;  Surgeon: César Peraza MD;  Location:  [facility];  Service: Neurosurgery   • SINUS SURGERY         Current Outpatient Medications:   •  diphenhydrAMINE (BENADRYL) 25 mg capsule, Take 25 mg by mouth Every 6 (Six) Hours As Needed for Itching., Disp: , Rfl:   •  esomeprazole (nexIUM) 20 MG capsule, Take 20 mg by mouth Every Morning Before Breakfast., Disp: , Rfl:   •  folic acid (FOLVITE) 1 MG tablet, Take 1 mg by mouth Daily., Disp: , Rfl:   •  gabapentin (NEURONTIN) 600 MG tablet, Take 600 mg by mouth 3 (Three) Times a Day., Disp: , Rfl:   •  HYDROcodone-acetaminophen (NORCO) 5-325 MG per tablet, Take 1 tablet by mouth 2 (Two) Times a Day., Disp: 14 tablet, Rfl: 0  •  hydroxychloroquine (PLAQUENIL) 200 MG tablet, Take 200 mg by mouth Daily., Disp: , Rfl:   •  lansoprazole (PREVACID) 30 MG capsule, TAKE ONE CAPSULE BY MOUTH EVERY DAY FOR FOR GI DISTRESS/GERD, Disp: , Rfl: 0  •   "loratadine (CLARITIN) 10 MG tablet, Take 10 mg by mouth Daily., Disp: , Rfl:   •  methocarbamol (ROBAXIN) 750 MG tablet, Take 1 tablet by mouth 3 (Three) Times a Day., Disp: 60 tablet, Rfl: 1  •  ondansetron (ZOFRAN) 4 MG tablet, Take 1 tablet by mouth Every 6 (Six) Hours As Needed for Nausea or Vomiting., Disp: 20 tablet, Rfl: 0  •  VENTOLIN  (90 Base) MCG/ACT inhaler, Inhale 2 puffs Every 4 (Four) Hours As Needed for Wheezing., Disp: , Rfl:   •  vitamin B-12 (CYANOCOBALAMIN) 1000 MCG tablet, Take 1,000 mcg by mouth Daily., Disp: , Rfl:   No current facility-administered medications for this visit.    Allergies   Allergen Reactions   • Asa [Aspirin] GI Intolerance and Unknown (See Comments)     Gi upset    • Bean Pod Extract GI Intolerance     MILK AND SOY   • Dipth, Acell Pertus, [Tetanus-Diphth-Acell Pertussis] Unknown - High Severity   • Penicillins GI Intolerance   • Soybean-Containing Drug Products GI Intolerance     All soy   • Strawberry Flavor GI Intolerance   • Tetanus-Diphth-Acell Pertussis Unknown (See Comments)     \" legs swelling\" happened when was an infant    • Pertussis Vaccine Unknown - High Severity       The patient's review of systems, past medical history, past surgical history, family history, and social history have been reviewed at length in the electronic medical record.    Physical Exam:   Temp 97.1 °F (36.2 °C) (Infrared)   Ht 160 cm (63\")   Wt 73.8 kg (162 lb 12.8 oz)   LMP 10/19/2021   BMI 28.84 kg/m²   CONSTITUTIONAL:   - Patient is well-nourished  - Pleasant and appears stated age.  PSYCHIATRIC:  - Normal mood and affect  - Behavior is normal.  - Thought content is normal  HENT:   Head: Normocephalic and Atraumatic.   Eyes:     - Pupils are equal, round, and reactive to light.     - EOM are normal.   CV:   - Regular rate and rhythm on palpable radial pulse   - No murmur appreciated   PULMONARY:   - Speaking in full sentences  - No use of accessory muscles   - Breathing is " non-labored   - No wheezing   SKIN:  - Clean, dry and intact   MUSCULOSKELETAL:  - Back tenderness to palpation is observed centrally throughout her axial spine.   - ROM in back is somewhat limited with flexion/extension due to pain.  - Straight leg raise is negative   NEUROLOGICAL:  - A&Ox3  - Attention span, language function, and cognition are intact.  - Strength is intact in the upper and lower extremities to direct testing.  - Muscle tone is normal throughout.  - Station and gait are normal.  - Sensation is intact to light touch testing throughout.  - Deep tendon reflexes are able to elicit in her bilateral patellar.    - Srinivasan's Sign is negative bilaterally.  - No clonus is elicited.  - Coordination is intact.    Patient's Body mass index is 28.84 kg/m². indicating that she is overweight (BMI 25-29.9). Obesity-related health conditions include the following: GERD. Obesity is unchanged. BMI is is above average; BMI management plan is completed. We discussed portion control and increasing exercise..         Medical Decision Making    Data Review:   1. MRI Lumbar Spine (10/27/2021):  Independent review of radiographic imaging demonstrates postsurgical changes consistent with prior lumbar fusion from L5-S1.  Construct is maintained in excellent alignment.  No evidence of adjacent disease, or central or foraminal compromise.    Imaging was also reviewed with Dr. Bynum.    Diagnosis/Treatment Options:  1. History of lumbar fusion  2. History of fusion of cervical spine  3. Degenerative disc disease, lumbar  4. Chronic pain syndrome  5. BMI 28.0-28.9,adult       Follow up:  Ms. Hugo is seen today in follow-up with continued low back and left leg pain despite time, therapy, and medicines.  After reviewing her MRI with Dr. Bynum there remains no active role for neurosurgical intervention.  Patient would benefit from continued conservative management and pain management.  We discussed the merits of smoking  cessation as it will ensure optimal postsurgical outcomes and fusion progression.  Patient verbalized understanding however she is not willing to quit at this time.  She will continue to observe and will follow-up in our office on an as-needed basis moving forward.    Carl Almendarez MA   10/27/2021   11:52 EDT

## 2021-10-27 NOTE — PATIENT INSTRUCTIONS

## 2021-11-01 ENCOUNTER — APPOINTMENT (OUTPATIENT)
Dept: MRI IMAGING | Facility: HOSPITAL | Age: 36
End: 2021-11-01

## 2021-11-12 RX ORDER — METHOCARBAMOL 750 MG/1
TABLET, FILM COATED ORAL
Qty: 60 TABLET | Refills: 1 | Status: SHIPPED | OUTPATIENT
Start: 2021-11-12

## 2021-11-12 NOTE — TELEPHONE ENCOUNTER
Provider:  Last seen YOHANNES Oneill  Caller: Auto refill  Time of call:     Phone #:  [phone]  Surgery:  LUMBAR LAMINECTOMY POSTERIOR LUMBAR INTERBODY FUSION RIGHT L5-S1  Surgery Date:  Surgery with César Peraza MD (02/08/2019)  Last visit:   Office Visit with Irina Vaughan PA-C (10/27/2021)    Next visit:     SHIRA:         Reason for call:       Requested Prescriptions     Pending Prescriptions Disp Refills   • methocarbamol (ROBAXIN) 750 MG tablet [Pharmacy Med Name: METHOCARBAMOL 750 MG TABS 750 Tablet] 60 tablet 1     Sig: TAKE ONE TABLET BY MOUTH THREE TIMES A DAY

## 2023-03-02 ENCOUNTER — TELEPHONE (OUTPATIENT)
Dept: NEUROSURGERY | Facility: CLINIC | Age: 38
End: 2023-03-02
Payer: COMMERCIAL

## 2023-03-02 NOTE — TELEPHONE ENCOUNTER
I can see the patient next Wednesday and order updated imaging. I will evaluate her for another surgeon rather than Dr. De Dios

## 2023-03-02 NOTE — TELEPHONE ENCOUNTER
Provider:  Stu  Surgery/Procedure:  Lumbar laminectomy interbody Fusion right L5-S1  Surgery/Procedure Date:  2/8/19  Last visit:   10/27/21  Next visit: NA     Reason for call: Patient called requesting office follow up, last seen on 10/27/21. Patient has had several surgeries with Dr. Peraza, including lumbar laminectomy with fusion and ACDF C6-7. Reports that around 2 months ago she began to have neck pain, there was no associated trauma that led up to the pain. She reports it as being painful in certain positions, and that it feels like a hammer striking her in the neck, the pain radiates from the right side of her jaw down to the right collar bone, and she has numbness and tingling in bilateral upper extremities.     She also reports left leg symptoms that have been going on much longer than the neck. She has numbness and tingling in the left leg, as well as shooting pain down the posterior part of her leg, and burning sensation in her left foot. The numbness is affecting her gait especially with climbing steps, she has noticed on several occasions that she has not lifted her foot high enough to take the step and has tripped. She reports no saddle anesthesia.     She's currently seeing pain management, is prescribed gabapentin 600mg TID, Norco 7.5mg TID,  Methocarbamol 750mg TID, and has also tried NSAIDS but all medications give minimal relief. She has not tried epidural injections, has been asked by PM but has declined.    Would like to schedule follow up with us if possible.

## 2023-03-02 NOTE — TELEPHONE ENCOUNTER
"    Caller: ROSALIE GRIFFITH    Relationship to patient: SELF    Best call back number: [phone]    Chief complaint: PATIENT CALLED TO SCHEDULE FOLLOW UP APPT.  PT STATES SHE IS HAVING WORSENING NECK PAIN AND HER LEFT LEG HAS GOTTEN WORSE.  PT IS A PREV DR. BARRETT PATIENT AND HAD PREV SX WITH HIM.  PATIENT STATES HER NECK PAIN STARTED AGAIN ABOUT 2 MONTHS AGO AND LEFT LEG SYMPTOMS HAVE BEEN OCCURRING \"FOR AWHILE NOW\".  PT HAS BEEN SEEN FOR LUMBAR AND CERVICAL PREVIOUSLY.  PATIENT WAS LAST SEEN 10/27/2021.  PATIENT HAS NO RECENT IMAGING    Type of visit: FOLLOW UP    Requested date: AS SOON AS POSSIBLE    Additional notes:    PLEASE CALL PATIENT     THANK YOU          "

## 2023-03-10 ENCOUNTER — OFFICE VISIT (OUTPATIENT)
Dept: NEUROSURGERY | Facility: CLINIC | Age: 38
End: 2023-03-10
Payer: COMMERCIAL

## 2023-03-10 VITALS — BODY MASS INDEX: 28.99 KG/M2 | WEIGHT: 163.6 LBS | HEIGHT: 63 IN | RESPIRATION RATE: 17 BRPM

## 2023-03-10 DIAGNOSIS — Z98.1 STATUS POST CERVICAL SPINAL FUSION: ICD-10-CM

## 2023-03-10 DIAGNOSIS — G89.29 CHRONIC BILATERAL LOW BACK PAIN WITH LEFT-SIDED SCIATICA: Primary | ICD-10-CM

## 2023-03-10 DIAGNOSIS — M54.42 CHRONIC BILATERAL LOW BACK PAIN WITH LEFT-SIDED SCIATICA: Primary | ICD-10-CM

## 2023-03-10 DIAGNOSIS — M79.602 LEFT ARM PAIN: ICD-10-CM

## 2023-03-10 DIAGNOSIS — M54.2 CERVICALGIA: ICD-10-CM

## 2023-03-10 DIAGNOSIS — Z72.0 TOBACCO ABUSE: ICD-10-CM

## 2023-03-10 DIAGNOSIS — M96.1 POST LAMINECTOMY SYNDROME: Primary | ICD-10-CM

## 2023-03-10 DIAGNOSIS — R29.898 LEFT LEG WEAKNESS: ICD-10-CM

## 2023-03-10 DIAGNOSIS — Z86.59 HISTORY OF CLAUSTROPHOBIA: ICD-10-CM

## 2023-03-10 PROBLEM — G43.009 MIGRAINE WITHOUT AURA AND WITHOUT STATUS MIGRAINOSUS, NOT INTRACTABLE: Status: ACTIVE | Noted: 2023-01-11

## 2023-03-10 PROBLEM — G43.909 MIGRAINE HEADACHE: Status: ACTIVE | Noted: 2022-01-13

## 2023-03-10 PROBLEM — H04.123 INSUFFICIENCY OF TEAR FILM OF BOTH EYES: Status: ACTIVE | Noted: 2020-06-26

## 2023-03-10 PROBLEM — R13.10 DYSPHAGIA: Status: ACTIVE | Noted: 2021-03-04

## 2023-03-10 PROBLEM — Z00.00 ENCOUNTER FOR GENERAL ADULT MEDICAL EXAMINATION WITHOUT ABNORMAL FINDINGS: Status: ACTIVE | Noted: 2023-01-06

## 2023-03-10 PROCEDURE — 1160F RVW MEDS BY RX/DR IN RCRD: CPT

## 2023-03-10 PROCEDURE — 1159F MED LIST DOCD IN RCRD: CPT

## 2023-03-10 PROCEDURE — 99214 OFFICE O/P EST MOD 30 MIN: CPT

## 2023-03-10 RX ORDER — ADALIMUMAB 40MG/0.8ML
KIT SUBCUTANEOUS
COMMUNITY
Start: 2023-02-13

## 2023-03-10 NOTE — PROGRESS NOTES
Subjective   Patient: Elisa Hugo   Age, Date of Birth: 37 y.o., 1985  Sex: female    Primary Care Provider: Mirella Vasquez APRN    Chief Complaint: Chronic neck and back pain    History of Present Illness:  Patient is a 37-year-old female known to our practice for undergoing an ACDF at C6-C7 with Dr. César Peraza.  Postoperatively she had had a resolution of her neck and arm symptoms, that was until around 2 months ago when she began to develop severe neck pain.  The pain radiates down her arm into her index and middle fingers, however most of the pain is in her neck.  Certain motions make her pain worse.  She is unable to abduct her left arm past 90 degrees, however that has been going on prior to her surgery.  She denies any weakness in her right arm.  Patient has not been to physical therapy for her neck pain.  Currently she is a patient of the pain treatment center of the [facility], they are the ones to manage her Norco 5 and gabapentin 600 mg 3 times a day.  She has refused multiple epidural steroid injections with them out of a fear of needles in her back.    She also is complaining of a lower back pain that radiates down her left leg into her foot in a glove like distribution.  She has had chronic low back pain since her most recent surgery in 2019, which was a right-sided L5-S1 laminectomy with Dr. César whitehead, but the radiating pain down her left leg is new over the last 6 months.  The leg in its entirety hurts, it is tender to the touch.  She has bilateral low back pain, but the back pain itself is actually more severe on the right side than the left side, but the right-sided low back pain does not radiate.  Of note her lumbar surgeries did not bring about much relief from her low back pain.  She reports a burning sensation in her left foot.  It will go numb after walking, sitting, standing.  Patient also reports spasming and tingling in her leg.  Specific motions do not make it worse, she  denies color changes or intractable pain.  She denies a specific calf pain.  Patient does report some weakness in her left foot, it is difficult to determine if it is a muscular weakness or just pain.  Patient denies urinary or bowel incontinence, saddle anesthesia, balance issues.      Current Outpatient Medications   Medication Sig Dispense Refill   • diphenhydrAMINE (BENADRYL) 25 mg capsule Take 1 capsule by mouth Every 6 (Six) Hours As Needed for Itching.     • esomeprazole (nexIUM) 20 MG capsule Take 1 capsule by mouth Every Morning Before Breakfast.     • folic acid (FOLVITE) 1 MG tablet Take 1 tablet by mouth Daily.     • gabapentin (NEURONTIN) 600 MG tablet Take 1 tablet by mouth 3 (Three) Times a Day.     • HYDROcodone-acetaminophen (NORCO) 5-325 MG per tablet Take 1 tablet by mouth 2 (Two) Times a Day. 14 tablet 0   • hydroxychloroquine (PLAQUENIL) 200 MG tablet Take 1 tablet by mouth Daily.     • lansoprazole (PREVACID) 30 MG capsule TAKE ONE CAPSULE BY MOUTH EVERY DAY FOR FOR GI DISTRESS/GERD  0   • loratadine (CLARITIN) 10 MG tablet Take 1 tablet by mouth Daily.     • methocarbamol (ROBAXIN) 750 MG tablet TAKE ONE TABLET BY MOUTH THREE TIMES A DAY 60 tablet 1   • ondansetron (ZOFRAN) 4 MG tablet Take 1 tablet by mouth Every 6 (Six) Hours As Needed for Nausea or Vomiting. 20 tablet 0   • VENTOLIN  (90 Base) MCG/ACT inhaler Inhale 2 puffs Every 4 (Four) Hours As Needed for Wheezing.     • vitamin B-12 (CYANOCOBALAMIN) 1000 MCG tablet Take 1 tablet by mouth Daily.     • Humira Pen 40 MG/0.8ML Pen-injector Kit        No current facility-administered medications for this visit.       Past Medical History:   Diagnosis Date   • Asthma    • GERD (gastroesophageal reflux disease)    • Headache    • History of seizure as     • History of staph infection 2008    right leg after delivery of daughter; po meds only     • Hx of migraines    • Lower back pain    • Lumbar herniated disc    • MRSA infection      history of   • Stomach ulcer        Review of Systems   Constitutional: Negative for activity change, appetite change, chills, diaphoresis, fatigue, fever and unexpected weight change.   HENT: Negative for congestion, dental problem, drooling, ear discharge, ear pain, facial swelling, hearing loss, mouth sores, nosebleeds, postnasal drip, rhinorrhea, sinus pressure, sneezing, sore throat, tinnitus, trouble swallowing and voice change.    Eyes: Negative for photophobia, pain, discharge, redness, itching and visual disturbance.   Respiratory: Negative for apnea, cough, choking, chest tightness, shortness of breath, wheezing and stridor.    Cardiovascular: Negative for chest pain, palpitations and leg swelling.   Gastrointestinal: Negative for abdominal distention, abdominal pain, anal bleeding, blood in stool, constipation, diarrhea, nausea, rectal pain and vomiting.   Endocrine: Negative for cold intolerance, heat intolerance, polydipsia, polyphagia and polyuria.   Genitourinary: Negative for decreased urine volume, difficulty urinating, dysuria, enuresis, flank pain, frequency, genital sores, hematuria and urgency.   Musculoskeletal: Positive for back pain, neck pain and neck stiffness. Negative for arthralgias, gait problem, joint swelling and myalgias.   Skin: Negative for color change, pallor, rash and wound.   Allergic/Immunologic: Negative for environmental allergies, food allergies and immunocompromised state.   Neurological: Positive for weakness, numbness and headaches. Negative for dizziness, tremors, seizures, syncope, facial asymmetry, speech difficulty and light-headedness.   Hematological: Negative for adenopathy. Does not bruise/bleed easily.   Psychiatric/Behavioral: Negative for agitation, behavioral problems, confusion, decreased concentration, dysphoric mood, hallucinations, self-injury, sleep disturbance and suicidal ideas. The patient is not nervous/anxious and is not hyperactive.    All  "other systems reviewed and are negative.      Objective   Vitals:    03/10/23 1340   Resp: 17   Weight: 74.2 kg (163 lb 9.6 oz)   Height: 160 cm (63\")        Physical Exam:    Physical Exam  Vitals and nursing note reviewed.   Constitutional:       General: She is not in acute distress.     Appearance: Normal appearance. She is not ill-appearing, toxic-appearing or diaphoretic.   HENT:      Head: Normocephalic and atraumatic.   Neck:        Comments: The majority of the patient's neck pain is in the above distribution in red  Cardiovascular:      Pulses:           Radial pulses are 3+ on the right side and 3+ on the left side.        Femoral pulses are 3+ on the right side and 3+ on the left side.       Dorsalis pedis pulses are 3+ on the right side and 3+ on the left side.        Posterior tibial pulses are 3+ on the right side and 3+ on the left side.   Musculoskeletal:        Arms:       Right lower leg: No edema.      Left lower leg: No edema.        Legs:       Comments: 5 out of 5 strength with elbow extension and flexion bilaterally.  She was unable to abduct her left arm past 90 degrees, any attempt to do so resulted in a significant amount of pain  5 out of 5 strength in intrinsic hand muscles bilaterally.  5 out of 5 strength with hip flexion, dorsiflexion bilaterally.  She had 3 out of 5 strength with left plantarflexion, there appeared to be a significant amount of pain when she did this.  She had 5 out of 5 strength with right plantarflexion.    Periodic radiating neck pain as above in black.  Low back pain is above in red.   Feet:      Right foot:      Skin integrity: Skin integrity normal.      Left foot:      Skin integrity: Skin integrity normal.      Comments: Patient's capillary refill in her toes was less than 2 seconds bilaterally.  There were no painful or painless ulcers on the patient's feet.  They had good coloration.  Her left foot was tender to the touch in a diffuse " pattern.  Neurological:      Mental Status: She is alert.      Cranial Nerves: No cranial nerve deficit or facial asymmetry.      Sensory: Sensation is intact. No sensory deficit.      Motor: Motor function is intact. No weakness, tremor, atrophy, abnormal muscle tone or seizure activity.      Coordination: Romberg sign negative.      Gait: Gait is intact.      Deep Tendon Reflexes:      Reflex Scores:       Tricep reflexes are 2+ on the right side and 2+ on the left side.       Bicep reflexes are 2+ on the right side and 2+ on the left side.       Brachioradialis reflexes are 2+ on the right side and 2+ on the left side.       Patellar reflexes are 2+ on the right side and 2+ on the left side.       Achilles reflexes are 2+ on the right side and 2+ on the left side.     Comments: Straight leg raise on right side reproduced a low back pain, straight leg raise on left side reproduces shooting pain down the patient's leg  Zach absent bilaterally.  Ankle clonus absent bilaterally.  Intact sensation in all extremities.  Patient was unable to perform tandem walk secondary to perceived weakness in her left leg.  When she walked on her heel she describes a tightness in her thigh.     Psychiatric:         Mood and Affect: Mood normal.         Behavior: Behavior normal.         Thought Content: Thought content normal.         Judgment: Judgment normal.         Tobacco Use: High Risk   • Smoking Tobacco Use: Every Day   • Smokeless Tobacco Use: Never   • Passive Exposure: Not on file     Encouraged the patient to quit smoking, I defer to her primary care provider for cessation modalities    BMI is >= 25 and <30. (Overweight) The following options were offered after discussion;: exercise counseling/recommendations      STEADI Fall Risk Assessment has not been completed.    Assessment & Plan     Data Review:  (All imaging is independently reviewed unless stated otherwise.)  No new data for me to review at this time.    Lab  Results   Component Value Date/Time    HGBA1C 5.00 02/04/2019 10:32 AM       Tobacco Use: High Risk   • Smoking Tobacco Use: Every Day   • Smokeless Tobacco Use: Never   • Passive Exposure: Not on file       Body mass index is 28.98 kg/m².    Anticoagulation review (and indication): None.      Medical Decision Making:  This is a 37-year-old female who has an extensive surgical history with our practice and chronic neck and back pain.  She has been evaluated by Dr. Jose Ramon De Dios in the past and has no interest in seeing him in the future.  Tentatively I will have her follow-up with Dr. Ramos Arredondo.  Patient's neck pain is predominantly in her neck and likely mechanical in nature, however given her history of ACDF and recurrence of symptoms I cannot rule out adjacent level disease without updated MRI cervical imaging.  I have placed that order.  I have also ordered cervical x-ray flexion-extension films to assess her hardware.  In regards to her left shoulder pain, it would be quite unusual for a nerve impingement resulting in such significant loss of range of motion in her left shoulder.  Also given the fact that this was not resolved by an ACDF which helped all of her other pain, it is likely that his loss of range of motion is from a shoulder etiology, likely the rotator cuff.  I recommended she touch base with her primary care provider and get that evaluation done.    Patient's low back pain can assume to be a postlaminectomy syndrome, this can really only be addressed with physical therapy as well as pain management.  It is unlikely that any surgical intervention would be able to help that.  Her left leg symptoms really are not consistent with a radiculopathy seeing as her leg is tender to the touch and the pain is her leg in its entirety.  She has good pulses and does not report any color changes especially after walking, it does not sound vascular at this point in time.  However she does have a history  of prior lumbar surgeries and I will order an MRI of her lumbar spine with and without contrast to assess for nerve impingement.  Her tenderness to palpation in a nondermatomal distribution could be consistent with a complex pain syndrome, I encouraged her to talk to her pain management provider about this diagnosis.    Patient made it known to me that she will need Xanax to get her MRIs done, I have routed an order for that but if she calls again closer to her MRI appointment it would be reasonable to fill that.    Patient will follow-up with Dr. Ramos Arredondo to go over those films  once they are completed.   Diagnosis Plan   1. Chronic bilateral low back pain with left-sided sciatica  MRI Lumbar Spine With & Without Contrast    XR Spine Lumbar AP & Lateral With Flex & Ext      2. Cervicalgia  MRI Cervical Spine Without Contrast    XR Spine Cervical Complete With Flex Ext      3. Left leg weakness  MRI Lumbar Spine With & Without Contrast    XR Spine Lumbar AP & Lateral With Flex & Ext      4. Tobacco abuse        5. Left arm pain  MRI Cervical Spine Without Contrast    XR Spine Cervical Complete With Flex Ext           Electronically signed and dated:    Torito Castanon PA-C on 16:50 EST 03/10/23

## 2023-03-14 DIAGNOSIS — M96.1 POST LAMINECTOMY SYNDROME: Primary | ICD-10-CM

## 2023-03-14 RX ORDER — ALPRAZOLAM 0.25 MG/1
0.25 TABLET ORAL 2 TIMES DAILY PRN
Qty: 2 TABLET | Refills: 0 | Status: SHIPPED | OUTPATIENT
Start: 2023-03-14 | End: 2023-03-14

## 2023-03-14 RX ORDER — ALPRAZOLAM 0.25 MG/1
0.25 TABLET ORAL 2 TIMES DAILY PRN
Qty: 2 TABLET | Refills: 0 | Status: SHIPPED | OUTPATIENT
Start: 2023-03-14

## 2023-04-17 ENCOUNTER — HOSPITAL ENCOUNTER (OUTPATIENT)
Dept: MRI IMAGING | Facility: HOSPITAL | Age: 38
Discharge: HOME | End: 2023-04-17
Payer: COMMERCIAL

## 2023-04-17 ENCOUNTER — HOSPITAL ENCOUNTER (OUTPATIENT)
Dept: GENERAL RADIOLOGY | Facility: HOSPITAL | Age: 38
Discharge: HOME | End: 2023-04-17
Payer: COMMERCIAL

## 2023-04-17 DIAGNOSIS — M54.42 CHRONIC BILATERAL LOW BACK PAIN WITH LEFT-SIDED SCIATICA: ICD-10-CM

## 2023-04-17 DIAGNOSIS — M79.602 LEFT ARM PAIN: ICD-10-CM

## 2023-04-17 DIAGNOSIS — G89.29 CHRONIC BILATERAL LOW BACK PAIN WITH LEFT-SIDED SCIATICA: ICD-10-CM

## 2023-04-17 DIAGNOSIS — M54.2 CERVICALGIA: ICD-10-CM

## 2023-04-17 DIAGNOSIS — R29.898 LEFT LEG WEAKNESS: ICD-10-CM

## 2023-04-17 PROCEDURE — 72052 X-RAY EXAM NECK SPINE 6/>VWS: CPT

## 2023-04-17 PROCEDURE — 72110 X-RAY EXAM L-2 SPINE 4/>VWS: CPT

## 2023-04-17 PROCEDURE — A9577 INJ MULTIHANCE: HCPCS

## 2023-04-17 PROCEDURE — 72141 MRI NECK SPINE W/O DYE: CPT

## 2023-04-17 PROCEDURE — 0 GADOBENATE DIMEGLUMINE 529 MG/ML SOLUTION

## 2023-04-17 PROCEDURE — 72158 MRI LUMBAR SPINE W/O & W/DYE: CPT

## 2023-04-17 RX ADMIN — GADOBENATE DIMEGLUMINE 15 ML: 529 INJECTION, SOLUTION INTRAVENOUS at 16:03

## 2023-04-24 ENCOUNTER — OFFICE VISIT (OUTPATIENT)
Dept: NEUROSURGERY | Facility: CLINIC | Age: 38
End: 2023-04-24
Payer: COMMERCIAL

## 2023-04-24 VITALS
WEIGHT: 164 LBS | TEMPERATURE: 97.3 F | HEIGHT: 63 IN | SYSTOLIC BLOOD PRESSURE: 92 MMHG | DIASTOLIC BLOOD PRESSURE: 60 MMHG | BODY MASS INDEX: 29.06 KG/M2

## 2023-04-24 DIAGNOSIS — M96.1 POST LAMINECTOMY SYNDROME: Primary | ICD-10-CM

## 2023-04-24 PROCEDURE — 99213 OFFICE O/P EST LOW 20 MIN: CPT | Performed by: STUDENT IN AN ORGANIZED HEALTH CARE EDUCATION/TRAINING PROGRAM

## 2023-04-24 RX ORDER — HYDROCODONE BITARTRATE AND ACETAMINOPHEN 7.5; 325 MG/1; MG/1
1 TABLET ORAL 3 TIMES DAILY
COMMUNITY
Start: 2023-04-20

## 2023-04-24 RX ORDER — PINDOLOL 10 MG/1
10 TABLET ORAL
COMMUNITY
Start: 2023-04-05

## 2023-04-24 RX ORDER — DEXAMETHASONE 4 MG/1
2 TABLET ORAL 2 TIMES DAILY
COMMUNITY
Start: 2023-04-05

## 2023-04-24 NOTE — PROGRESS NOTES
"NEUROSURGERY PROGRESS NOTE    Patient: Elisa Hugo  : 1985    Primary Care Provider: Mirella Vasquez APRN    Chief Complaint: Neck, arm, back, leg pain    Subjective: This is a 37-year-old female who is previously undergone a C6-7 ACDF as well as an L4-5 discectomy followed by an L4-5 fusion.  She is here for evaluation of neck and back pain as well as arm and leg pain.  Regards the patient's neck pain is focused primarily in the back of her neck.  She has pain that goes down her left arm which she states has been there since her neck surgery.  She has EMG confirmation of a C7 radiculopathy.  In addition to this, she is having back and left leg pain.  The pain starts in her back goes down the entire left leg.  Does not appear to be in any specific dermatomal pattern.  She states she has been having this since her surgery in 2018.  She is tried physical therapy but not injections.    Objective    Vital Signs: Blood pressure 92/60, temperature 97.3 °F (36.3 °C), temperature source Infrared, height 160 cm (63\"), weight 74.4 kg (164 lb), last menstrual period 2023, not currently breastfeeding.    Physical Exam  Awake, alert and oriented x 3  Opens eyes spont  Pupils 3 mm rx bilat  Extraocular muscles intact bilaterally  Face symmetric bilaterally  Tongue midline  5/5 in the bilateral upper and right lower extremity.  In her left lower extremity she is diffusely 4+ to 5.  No Srinivasan's    Current Medications:   Current Outpatient Medications:   •  ALPRAZolam (Xanax) 0.25 MG tablet, Take 1 tablet by mouth 2 (Two) Times a Day As Needed for Anxiety., Disp: 2 tablet, Rfl: 0  •  diphenhydrAMINE (BENADRYL) 25 mg capsule, Take 1 capsule by mouth Every 6 (Six) Hours As Needed for Itching., Disp: , Rfl:   •  esomeprazole (nexIUM) 20 MG capsule, Take 1 capsule by mouth Every Morning Before Breakfast., Disp: , Rfl:   •  Flovent  MCG/ACT inhaler, Inhale 2 puffs 2 (Two) Times a Day., Disp: , Rfl:   •  folic " acid (FOLVITE) 1 MG tablet, Take 1 tablet by mouth Daily., Disp: , Rfl:   •  gabapentin (NEURONTIN) 600 MG tablet, Take 1 tablet by mouth 3 (Three) Times a Day., Disp: , Rfl:   •  Humira Pen 40 MG/0.8ML Pen-injector Kit, , Disp: , Rfl:   •  HYDROcodone-acetaminophen (NORCO) 7.5-325 MG per tablet, Take 1 tablet by mouth 3 (Three) Times a Day., Disp: , Rfl:   •  hydroxychloroquine (PLAQUENIL) 200 MG tablet, Take 1 tablet by mouth Daily., Disp: , Rfl:   •  lansoprazole (PREVACID) 30 MG capsule, TAKE ONE CAPSULE BY MOUTH EVERY DAY FOR FOR GI DISTRESS/GERD, Disp: , Rfl: 0  •  loratadine (CLARITIN) 10 MG tablet, Take 1 tablet by mouth Daily., Disp: , Rfl:   •  methocarbamol (ROBAXIN) 750 MG tablet, TAKE ONE TABLET BY MOUTH THREE TIMES A DAY, Disp: 60 tablet, Rfl: 1  •  ondansetron (ZOFRAN) 4 MG tablet, Take 1 tablet by mouth Every 6 (Six) Hours As Needed for Nausea or Vomiting., Disp: 20 tablet, Rfl: 0  •  pindolol (VISKEN) 10 MG tablet, Take 1 tablet by mouth every night at bedtime., Disp: , Rfl:   •  VENTOLIN  (90 Base) MCG/ACT inhaler, Inhale 2 puffs Every 4 (Four) Hours As Needed for Wheezing., Disp: , Rfl:   •  vitamin B-12 (CYANOCOBALAMIN) 1000 MCG tablet, Take 1 tablet by mouth Daily., Disp: , Rfl:      Laboratory Results:                              Brief Urine Lab Results     None        Microbiology Results (last 10 days)     ** No results found for the last 240 hours. **          Diagnostic Imaging: I reviewed and independently interpreted the new imaging.     Assessment/Plan:  This is a 37-year-old female with neck, arm, back, leg pain.  With regards to the patient's neck and arm pain.  She seems to have residual nerve pain from her C6-7 ACDF.  She does have EMG confirmation of a C7 radiculopathy.  Her cervical MRI shows no significant degenerative changes or nerve root compression at any other levels.  I do not see a role for any type of surgical intervention on her neck.  With regards to the  patient's back and leg pain.  Again, her imaging is overall good.  Her lumbar hardware is in good position.  She has good decompression at the L4-5 level and no significant degenerative changes or nerve root compression at any other level.  I do not see a role for any type of surgical intervention on her lumbar spine.  I encouraged her to follow-up with her pain management doctor for discussion of injections.  Because there is no role for surgery, we will not schedule any further follow-up.    Diagnoses and all orders for this visit:    1. Post laminectomy syndrome (Primary)        Ramos Arredondo MD  04/24/23  10:03 EDT

## 2024-03-29 ENCOUNTER — TELEPHONE (OUTPATIENT)
Dept: NEUROSURGERY | Facility: CLINIC | Age: 39
End: 2024-03-29
Payer: COMMERCIAL

## 2024-03-29 NOTE — TELEPHONE ENCOUNTER
Caller: OanhElisa johnson CHANDAN    Relationship: Self    Best call back number: [phone]    What form or medical record are you requesting: LETTER STATING RESTRICTIONS    Who is requesting this form or medical record from you: PATIENT FOR DISABILITY     How would you like to receive the form or medical records (pick-up, mail, fax): MAIL  If mail, what is the address:   [address] [place], [place] [id number]       Timeframe paperwork needed: ASAP    Additional notes: PATIENT CALLED, REQUESTING A LETTER STATING A LIST OF HER RESTRICTIONS. PATIENT STATES IT NEEDS TO INCLUDE HOW LONG SHE IS ABLE TO SIT/STAND/WALK FOR. IT ALSO NEEDS TO STATE IF PATIENT REQUIRES PERIODS OF REST HOW LONG THOSE SHOULD BE AND HOW OFTEN CAN THEY BE. PATIENT STATES THEY ARE NEEDING SPECIFICS. PATIENT IS REQUESTING THIS BE MAILED TO HER. PLEASE CALL PATIENT TO ADVISE.  THANK YOU

## 2024-03-29 NOTE — TELEPHONE ENCOUNTER
Have instructed patient we do not set long term restrictions. Have asked her to reach out to her PCP and ask for a functional capacity exam, and explained to her that this is the type of testing needed to set long term restrictions for disability patients. She voiced understanding and will reach out to her PCP for an order.

## 2024-08-13 ENCOUNTER — TRANSCRIBE ORDERS (OUTPATIENT)
Dept: ADMINISTRATIVE | Facility: HOSPITAL | Age: 39
End: 2024-08-13
Payer: COMMERCIAL

## 2024-08-13 DIAGNOSIS — R07.2 PRECORDIAL PAIN: Primary | ICD-10-CM

## 2024-09-04 ENCOUNTER — TELEPHONE (OUTPATIENT)
Facility: HOSPITAL | Age: 39
End: 2024-09-04
Payer: COMMERCIAL

## 2024-09-04 NOTE — TELEPHONE ENCOUNTER
Pt contacted as pre-procedure phone call prior to planned CTA coronary for 9/5/24. Reviewed with patient arrival time between 10:15-10:30 to main registration, nothing to eat or drink by mouth 4 hours prior to arrival, no caffeine after midnight, please take premedications night before and morning of procedure with a small sip of water as instructed,  recommended,  reviewed procedure instructions and allowed time for questions, and reviewed home medications, allergies, and medical history.

## 2024-09-05 ENCOUNTER — HOSPITAL ENCOUNTER (OUTPATIENT)
Facility: HOSPITAL | Age: 39
Discharge: HOME | End: 2024-09-05
Payer: COMMERCIAL

## 2024-09-05 VITALS
TEMPERATURE: 98.1 F | BODY MASS INDEX: 24.88 KG/M2 | RESPIRATION RATE: 16 BRPM | HEIGHT: 64 IN | HEART RATE: 81 BPM | OXYGEN SATURATION: 99 % | DIASTOLIC BLOOD PRESSURE: 70 MMHG | WEIGHT: 145.72 LBS | SYSTOLIC BLOOD PRESSURE: 101 MMHG

## 2024-09-05 DIAGNOSIS — R07.2 PRECORDIAL PAIN: ICD-10-CM

## 2024-09-05 LAB
B-HCG UR QL: NEGATIVE
EXPIRATION DATE: NORMAL
INTERNAL NEGATIVE CONTROL: NORMAL
INTERNAL POSITIVE CONTROL: NORMAL
Lab: NORMAL

## 2024-09-05 PROCEDURE — 75574 CT ANGIO HRT W/3D IMAGE: CPT | Performed by: INTERNAL MEDICINE

## 2024-09-05 PROCEDURE — 75574 CT ANGIO HRT W/3D IMAGE: CPT

## 2024-09-05 PROCEDURE — 81025 URINE PREGNANCY TEST: CPT | Performed by: INTERNAL MEDICINE

## 2024-09-05 PROCEDURE — 25510000001 IOPAMIDOL PER 1 ML: Performed by: INTERNAL MEDICINE

## 2024-09-05 RX ORDER — METOPROLOL TARTRATE 25 MG/1
25 TABLET, FILM COATED ORAL ONCE
Status: COMPLETED | OUTPATIENT
Start: 2024-09-05 | End: 2024-09-05

## 2024-09-05 RX ORDER — IOPAMIDOL 755 MG/ML
100 INJECTION, SOLUTION INTRAVASCULAR
Status: DISCONTINUED | OUTPATIENT
Start: 2024-09-05 | End: 2024-09-06 | Stop reason: HOSPADM

## 2024-09-05 RX ORDER — METOPROLOL TARTRATE 100 MG
200 TABLET ORAL ONCE
Status: COMPLETED | OUTPATIENT
Start: 2024-09-05 | End: 2024-09-05

## 2024-09-05 RX ORDER — NITROGLYCERIN 0.4 MG/1
0.4 TABLET SUBLINGUAL
Status: COMPLETED | OUTPATIENT
Start: 2024-09-05 | End: 2024-09-05

## 2024-09-05 RX ORDER — METOPROLOL TARTRATE 1 MG/ML
5 INJECTION, SOLUTION INTRAVENOUS
Status: DISCONTINUED | OUTPATIENT
Start: 2024-09-05 | End: 2024-09-06 | Stop reason: HOSPADM

## 2024-09-05 RX ORDER — NITROGLYCERIN 0.4 MG/1
0.8 TABLET SUBLINGUAL
Status: COMPLETED | OUTPATIENT
Start: 2024-09-05 | End: 2024-09-05

## 2024-09-05 RX ORDER — METOPROLOL TARTRATE 1 MG/ML
10 INJECTION, SOLUTION INTRAVENOUS ONCE
Status: COMPLETED | OUTPATIENT
Start: 2024-09-05 | End: 2024-09-05

## 2024-09-05 RX ORDER — NITROGLYCERIN 0.4 MG/1
TABLET SUBLINGUAL
Status: DISCONTINUED
Start: 2024-09-05 | End: 2024-09-06 | Stop reason: HOSPADM

## 2024-09-05 RX ORDER — IVABRADINE 5 MG/1
15 TABLET, FILM COATED ORAL ONCE
Status: DISCONTINUED | OUTPATIENT
Start: 2024-09-05 | End: 2024-09-06 | Stop reason: HOSPADM

## 2024-09-05 RX ORDER — IOPAMIDOL 755 MG/ML
100 INJECTION, SOLUTION INTRAVASCULAR
Status: COMPLETED | OUTPATIENT
Start: 2024-09-05 | End: 2024-09-05

## 2024-09-05 RX ORDER — METOPROLOL TARTRATE 25 MG/1
50 TABLET, FILM COATED ORAL
Status: DISCONTINUED | OUTPATIENT
Start: 2024-09-05 | End: 2024-09-06 | Stop reason: HOSPADM

## 2024-09-05 RX ORDER — METOPROLOL TARTRATE 25 MG/1
50 TABLET, FILM COATED ORAL ONCE
Status: COMPLETED | OUTPATIENT
Start: 2024-09-05 | End: 2024-09-05

## 2024-09-05 RX ORDER — METOPROLOL TARTRATE 100 MG
100 TABLET ORAL ONCE
Status: COMPLETED | OUTPATIENT
Start: 2024-09-05 | End: 2024-09-05

## 2024-09-05 RX ADMIN — METOPROLOL TARTRATE 50 MG: 25 TABLET, FILM COATED ORAL at 11:58

## 2024-09-05 RX ADMIN — METOPROLOL TARTRATE 200 MG: 100 TABLET, FILM COATED ORAL at 10:37

## 2024-09-05 RX ADMIN — METOPROLOL TARTRATE 10 MG: 5 INJECTION INTRAVENOUS at 13:23

## 2024-09-05 RX ADMIN — NITROGLYCERIN 0.8 MG: 0.4 TABLET SUBLINGUAL at 13:37

## 2024-09-05 RX ADMIN — METOPROLOL TARTRATE 50 MG: 25 TABLET, FILM COATED ORAL at 11:27

## 2024-09-05 RX ADMIN — IOPAMIDOL 70 ML: 755 INJECTION, SOLUTION INTRAVENOUS at 13:37

## 2025-04-10 NOTE — OP NOTE
Interval H&P Note    Pt Name: Luci Castañeda  MRN: [id number]  YOB: 1963  Date of evaluation: 4/10/2025      [x] I have reviewed in epic the Hematology/Oncology Progress Note by Dr Jess Bradley dated 3/28/25 attached below for the Interval History and Physical note.     [x] I have examined  Luci Castañeda, a 62 y.o. female with known comorbidities and extensive PMH who arrived for her scheduled LEFT BREAST INCISION AND DRAINAGE  AND DEBRIDEMENT OF SEROMA by Davie Gordillo DO for Seroma of breast. The patient gets nauseated easily  Usually wears a scopolamine patch but took it off the morning before showering and forgot to replace the patch.  Anesthesiologist notified and order patch  She denies new health changes, fever, chills, wheezing, cough, increased SOB, chest pain, open sores or wounds.    Patient called to clarify bridging direction for Lovenox. \"Dr Walker advised to hold Eliquis 3 days prior to surgery. He states to take lovenox 80mcg SQ BID for 3 days, (today, tomorrow, Wednesday), hold day of procedure (Thursday), then resume lovenox for 2 days after sx (Friday and Saturday). He would like for her to resume eliquis on Sunday. \" Patient followed the anticoagulation regiment given by Dr Graham and stopped the Eliquis 4/6/25. Patient did continue her Lovenox with last dose today(4/10/25) Preop RN discussed with Surgeon, Dr Gordillo and he will proceed.     Allergies:  Mixed ragweed, Cat dander, and Seasonal    Medications:    Prior to Admission medications    Medication Sig Start Date End Date Taking? Authorizing Provider   enoxaparin (LOVENOX) 80 MG/0.8ML Inject 0.8 mLs into the skin 2 times daily for 3 days Starting 3 days prior to surgery. Hold lovenox day of surgery and resume day after surgery for 2 additional days. 4/7/25 4/10/25 Yes Jess Bradley MD   methocarbamol (ROBAXIN) 500 MG tablet Take 1 tablet by mouth 3 times daily as needed Taking at night    Provider,  OPERATIVE REPORT    DATE OF OPERATION: [10/17/18]    PREOPERATIVE DIAGNOSIS: [Herniated lumbar disc L5-S1 right]    POSTOPERATIVE DIAGNOSIS: [Same]    PROCEDURE PERFORMED: [Lumbar discectomy L5-S1 right]    SURGEON: César Peraza MD    ASSISTANT: [Darlene Linares PA-C]    INDICATIONS: [Patient had right hip and leg pain.  Lumbar myelogram demonstrated herniated disc at L5-S1 on the right.]    DESCRIPTON OF PROCEDURE: Surgery was performed under general anesthesia in the prone position on the laminectomy frame.  The back was prepared sterilely and draped.  The [right lower L5] lamina was identified with a spinal needle and an AP fluoroscopic image.  A short skin incision was made and a guidewire and dilators were used to place a [5] cm length x [18] mm width tubular retractor.  AP fluoroscopy confirmed this to be the [L5-S1] level on the [right].    A high-speed drill was used to perform a laminotomy by removing the inferior aspect of the lamina and thinning the medial aspect of the facet until the floor of the lateral recess was reached.  A Kerrison punch was used to remove the ligamentum flavum and expose the dura and floor of the lateral recess.  The epidural veins were coagulated with bipolar and divided with microscissors.    The dural sac and nerve root were retracted medially exposing the herniated disc.  A nerve root retractor was placed over the nerve root sleeve for exposure of the disc. The epidural veins were further coagulated and divided. An #15 scalpel blade was used to open the posterior disc annulus. Discectomy was performed using [straighten downward biting medium and micro-] pituitary rongeurs, removing the herniated disc component and performing a limited nuclear discectomy.  Following discectomy the dura, nerve root and epidural space were inspected to be sure decompression was complete and no disc fragments remained.     The nerve root retractor was removed and the laminotomy site was irrigated to  confirm complete hemostasis. Gelfoam was placed over the laminotomy site.  The tubular retractor was removed.  Marcaine was injected in the paraspinous muscle.  Subcuticular Vicryl closure was performed and glue was applied to the skin.  Blood loss was estimated at less than 10 mL.    César Peraza M.D.   (L) 03/27/2025       Chemistry        Component Value Date/Time     03/27/2025 1303    K 3.8 03/27/2025 1303     03/27/2025 1303    CO2 26 03/27/2025 1303    BUN 11 03/27/2025 1303    CREATININE 1.0 (H) 03/27/2025 1303        Component Value Date/Time    CALCIUM 9.4 03/27/2025 1303    ALKPHOS 98 03/27/2025 1303    AST 34 (H) 03/27/2025 1303    ALT 12 03/27/2025 1303    BILITOT 0.4 03/27/2025 1303          MANISHA BREAST SPECIMEN  EXAMINATION:  SPECIMEN RADIOGRAPH 2/24/2025    TECHNIQUE:  Single radiograph of the surgical specimen was obtained intraoperatively.    COMPARISON:  Prior ultrasound-guided wire localization performed on 02/24/2025.    HISTORY:  Specimen radiograph post lumpectomy. ORDERING SYSTEM PROVIDED HISTORY: post  lumpectomy  TECHNOLOGIST PROVIDED HISTORY:  post lumpectomy    61-year-old female status post lumpectomy of biopsy proven malignancy in the  left breast 1 o'clock axis 14 cm from the nipple.    FINDINGS/IMPRESSION:  Imaged specimen radiograph contains RFID, biopsy clip and the resected  tissue.  The radiographed specimen submitted to pathology.    Performing Facility: [facility] [id number] Phone: [phone]      IMPRESSION:  Clinical stage T2 N0 M0 left breast upper outer quadrant invasive ductal carcinoma, grade 3, ER negative, TN negative, HER2/carol not amplified.  Post neoadjuvant treatment pathological stage of T2 N0 M0 invasive ductal carcinoma.  Repeated biomarkers ER negative, TN negative, HER2/carol not amplified.  History of left nephrectomy in 1988  Chemo induced thrombocytopenia  Chemo induced neutropenia    PLAN:  I explained to the patient and her companions the nature of breast cancer, staging, prognosis and treatment. The pathology results were reviewed and explained in layman language.  Obviously she presents with relatively large tumor 4.5 cm which is triple negative.  Patient is seen today in the

## (undated) DEVICE — ANTIBACTERIAL UNDYED BRAIDED (POLYGLACTIN 910), SYNTHETIC ABSORBABLE SUTURE: Brand: COATED VICRYL

## (undated) DEVICE — SPHR MARKR STEALTH STATION

## (undated) DEVICE — DIFFUSER: Brand: CORE, MAESTRO

## (undated) DEVICE — ELECTRD BLD EDGE/INSUL1P 2.4X5.1MM STRL

## (undated) DEVICE — NDL SPINE 22G 31/2IN BLK

## (undated) DEVICE — PROBE 945SPK1004 SURGEON CONTROLLED

## (undated) DEVICE — 3M™ WARMING BLANKET, UPPER BODY, 10 PER CASE, 42268: Brand: BAIR HUGGER™

## (undated) DEVICE — OIL CARTRIDGE: Brand: CORE, MAESTRO

## (undated) DEVICE — TRY L/P SFTY A/20G

## (undated) DEVICE — 3.0MM PRECISION NEURO (MATCH HEAD)

## (undated) DEVICE — DRSNG SURESITE WNDW 4X4.5

## (undated) DEVICE — TRAP,MUCUS SPECIMEN,40CC: Brand: MEDLINE

## (undated) DEVICE — PLATE 3001019 ZEVO 19MM 1 LVL
Type: IMPLANTABLE DEVICE | Site: SPINE CERVICAL | Status: NON-FUNCTIONAL
Brand: ZEVO™ ANTERIOR CERVICAL PLATE SYSTEM
Removed: 2018-04-18

## (undated) DEVICE — ENCORE® LATEX MICRO SIZE 7, STERILE LATEX POWDER-FREE SURGICAL GLOVE: Brand: ENCORE

## (undated) DEVICE — MEDI-VAC NON-CONDUCTIVE SUCTION TUBING: Brand: CARDINAL HEALTH

## (undated) DEVICE — DRILL BIT 7080513 STERILE 13MM

## (undated) DEVICE — MEDI-VAC YANKAUER SUCTION HANDLE W/BULBOUS TIP: Brand: CARDINAL HEALTH

## (undated) DEVICE — TOTAL TRAY, 16FR 10ML SIL FOLEY, URN: Brand: MEDLINE

## (undated) DEVICE — PACK,UNIVERSAL,NO GOWNS: Brand: MEDLINE

## (undated) DEVICE — 1 ML TUBERCULIN SYRINGE REGULAR TIP: Brand: MONOJECT

## (undated) DEVICE — PIN, 9733236, 150MM, STERILE, PERC REF

## (undated) DEVICE — BANDAGE,GAUZE,BULKEE II,4.5"X4.1YD,STRL: Brand: MEDLINE

## (undated) DEVICE — HOLDER: Brand: DEROYAL

## (undated) DEVICE — ADAPT ST INFUS ADMIN SYR 70IN

## (undated) DEVICE — ENCORE® LATEX MICRO SIZE 6.5, STERILE LATEX POWDER-FREE SURGICAL GLOVE: Brand: ENCORE

## (undated) DEVICE — DRSNG WND BORDR/ADHS NONADHR/GZ LF 4X4IN STRL

## (undated) DEVICE — CONMED DISPOSABLE BIPOLAR CABLE, 10' (3.05M): Brand: CONMED

## (undated) DEVICE — SNAP KOVER: Brand: UNBRANDED

## (undated) DEVICE — SKIN AFFIX SURG ADHESIVE 72/CS 0.55ML: Brand: MEDLINE

## (undated) DEVICE — MAGNETIC DRAPE: Brand: DEVON

## (undated) DEVICE — NDL SPINE 26G 31/2IN LF

## (undated) DEVICE — CANNULA,OXY,ADULT,SUPERSOFT,W/7'TUB,UC: Brand: MEDLINE

## (undated) DEVICE — PK NEURO DISC 10

## (undated) DEVICE — ELECTRODE 945NRE1003 SD NEEDLE RECORDING

## (undated) DEVICE — 3M™ STERI-DRAPE™ INSTRUMENT POUCH 1018: Brand: STERI-DRAPE™

## (undated) DEVICE — EXT TAB SOLERA VOYAGER

## (undated) DEVICE — ENCORE® LATEX MICRO SIZE 8, STERILE LATEX POWDER-FREE SURGICAL GLOVE: Brand: ENCORE

## (undated) DEVICE — SPNG LAP PREWSH SFTPK 18X18IN STRL PK/5

## (undated) DEVICE — NDL HYPO ECLPS SFTY 22G 1 1/2IN

## (undated) DEVICE — INSTRUMENT 9560702 RADIANCE ILLUMIN SYS: Brand: METRX® SYSTEM

## (undated) DEVICE — SYS SKIN CLS DERMABOND PRINEO W/22CM MESH TP

## (undated) DEVICE — INSTRUMENT 875-088 14MM DSTRCT PIN STRL
Type: IMPLANTABLE DEVICE | Site: SPINE CERVICAL | Status: NON-FUNCTIONAL
Brand: MEDTRONIC REUSABLE INSTRUMENT
Removed: 2018-04-18

## (undated) DEVICE — AIRWY 90MM NO9

## (undated) DEVICE — CLTH CLENS READYCLEANSE PERI CARE PK/5

## (undated) DEVICE — SUT VIC 2/0 CT2 27IN J269H

## (undated) DEVICE — DRSNG WND BORDR/ADHS NONADHR/GZ LF 2X2IN STRL

## (undated) DEVICE — SOL LR 1000ML

## (undated) DEVICE — INTENDED FOR TISSUE SEPARATION, AND OTHER PROCEDURES THAT REQUIRE A SHARP SURGICAL BLADE TO PUNCTURE OR CUT.: Brand: BARD-PARKER ® STAINLESS STEEL BLADES

## (undated) DEVICE — 3M™ DURAPORE™ SURGICAL TAPE 1538-3, 3 INCH X 10 YARD (7,5CM X 9,1M), 4 ROLLS/BOX: Brand: 3M™ DURAPORE™

## (undated) DEVICE — NDL SPINE 20G 3 1/2 YEL STRL 1P/U

## (undated) DEVICE — HDRST INTUB GENTLETOUCH SLOT 7IN RT

## (undated) DEVICE — SUT SILK 2/0 TIES 18IN A185H

## (undated) DEVICE — ENCORE® LATEX MICRO SIZE 6, STERILE LATEX POWDER-FREE SURGICAL GLOVE: Brand: ENCORE

## (undated) DEVICE — DISPOSABLE BIPOLAR FORCEPS 7 3/4" (19.7CM) SCOVILLE BAYONET, INSULATED, 1.5MM TIP AND 12 FT. (3.6M) CABLE: Brand: KIRWAN

## (undated) DEVICE — 18MM RADIANCE ILLUM. SYSTEM: Brand: METRX® SYSTEM

## (undated) DEVICE — 3M™ WARMING BLANKET, LOWER BODY, 10 PER CASE, 42568: Brand: BAIR HUGGER™

## (undated) DEVICE — CVR HNDL LT SURG ACCSSRY BLU STRL

## (undated) DEVICE — SENSR O2 OXIMAX FNGR A/ 18IN NONSTR

## (undated) DEVICE — INSTRUMENT 8670001 SXT GUIDEWIRE BLUNT

## (undated) DEVICE — PK MED 10